# Patient Record
Sex: MALE | Race: WHITE | NOT HISPANIC OR LATINO | Employment: OTHER | URBAN - METROPOLITAN AREA
[De-identification: names, ages, dates, MRNs, and addresses within clinical notes are randomized per-mention and may not be internally consistent; named-entity substitution may affect disease eponyms.]

---

## 2017-05-12 ENCOUNTER — OFFICE VISIT (OUTPATIENT)
Dept: CARDIOLOGY | Facility: CLINIC | Age: 82
End: 2017-05-12

## 2017-05-12 VITALS
BODY MASS INDEX: 35.17 KG/M2 | SYSTOLIC BLOOD PRESSURE: 118 MMHG | WEIGHT: 265.4 LBS | DIASTOLIC BLOOD PRESSURE: 60 MMHG | HEIGHT: 73 IN | HEART RATE: 68 BPM

## 2017-05-12 DIAGNOSIS — E78.2 MIXED HYPERLIPIDEMIA: ICD-10-CM

## 2017-05-12 DIAGNOSIS — E11.65 TYPE 2 DIABETES MELLITUS WITH HYPERGLYCEMIA, WITHOUT LONG-TERM CURRENT USE OF INSULIN (HCC): ICD-10-CM

## 2017-05-12 DIAGNOSIS — I25.10 CORONARY ARTERY DISEASE INVOLVING NATIVE CORONARY ARTERY OF NATIVE HEART WITHOUT ANGINA PECTORIS: Primary | ICD-10-CM

## 2017-05-12 PROCEDURE — 99214 OFFICE O/P EST MOD 30 MIN: CPT | Performed by: INTERNAL MEDICINE

## 2017-05-12 RX ORDER — ZOLPIDEM TARTRATE 10 MG/1
10 TABLET ORAL NIGHTLY PRN
COMMUNITY
End: 2018-05-18

## 2017-05-12 RX ORDER — POTASSIUM CHLORIDE 20 MEQ/1
20 TABLET, EXTENDED RELEASE ORAL DAILY
COMMUNITY
End: 2022-06-08

## 2017-05-12 RX ORDER — FUROSEMIDE 20 MG/1
20 TABLET ORAL DAILY
COMMUNITY
End: 2022-09-23 | Stop reason: ALTCHOICE

## 2017-05-12 RX ORDER — HYDROCODONE BITARTRATE AND ACETAMINOPHEN 5; 325 MG/1; MG/1
1 TABLET ORAL EVERY 6 HOURS PRN
COMMUNITY

## 2017-05-12 RX ORDER — TAMSULOSIN HYDROCHLORIDE 0.4 MG/1
1 CAPSULE ORAL NIGHTLY
COMMUNITY
End: 2022-05-02

## 2018-05-18 ENCOUNTER — OFFICE VISIT (OUTPATIENT)
Dept: CARDIOLOGY | Facility: CLINIC | Age: 83
End: 2018-05-18

## 2018-05-18 VITALS
OXYGEN SATURATION: 92 % | HEIGHT: 73 IN | WEIGHT: 276 LBS | HEART RATE: 93 BPM | DIASTOLIC BLOOD PRESSURE: 62 MMHG | SYSTOLIC BLOOD PRESSURE: 114 MMHG | BODY MASS INDEX: 36.58 KG/M2

## 2018-05-18 DIAGNOSIS — E11.65 TYPE 2 DIABETES MELLITUS WITH HYPERGLYCEMIA, WITHOUT LONG-TERM CURRENT USE OF INSULIN (HCC): ICD-10-CM

## 2018-05-18 DIAGNOSIS — I25.10 CORONARY ARTERY DISEASE INVOLVING NATIVE CORONARY ARTERY OF NATIVE HEART WITHOUT ANGINA PECTORIS: Primary | ICD-10-CM

## 2018-05-18 DIAGNOSIS — E78.2 MIXED HYPERLIPIDEMIA: ICD-10-CM

## 2018-05-18 PROCEDURE — 99214 OFFICE O/P EST MOD 30 MIN: CPT | Performed by: INTERNAL MEDICINE

## 2018-05-18 RX ORDER — SUCRALFATE 1 G/1
1 TABLET ORAL 4 TIMES DAILY PRN
COMMUNITY
End: 2022-12-16 | Stop reason: SDUPTHER

## 2018-05-18 RX ORDER — FLUOXETINE HYDROCHLORIDE 20 MG/1
20 CAPSULE ORAL 2 TIMES DAILY
COMMUNITY
End: 2022-03-28 | Stop reason: ALTCHOICE

## 2018-05-18 RX ORDER — DUTASTERIDE 0.5 MG/1
0.5 CAPSULE, LIQUID FILLED ORAL DAILY
COMMUNITY
End: 2020-06-19

## 2018-05-18 RX ORDER — HYDROCODONE BITARTRATE AND ACETAMINOPHEN 5; 325 MG/1; MG/1
TABLET ORAL AS NEEDED
COMMUNITY
Start: 2018-03-05 | End: 2018-05-18

## 2018-05-18 NOTE — PROGRESS NOTES
[facility] at [facility]  Office Progress Note  Arie Fournier  1935  [phone]      Visit Date: 5/18/2018     PCP: Mike Bhardwaj MD  [address] [place] [id number]    IDENTIFICATION: A 83 y.o. male retired from [employer], originally from [place], [place], now lives in [place], [place].           PROBLEM LIST:   1. Coronary artery disease.   a. In 2002, left heart catheterization with minimal diffuse disease in the right coronary artery and minimal stenosis of 30% to the proximal LAD. Left ventricular ejection fraction 50%. Mild elevation of left ventricular end diastolic pressure, at [place], [place], per Chad Montes De Oca.   b. 4/16 echo EF 60% rvsp 40  2. Dyslipidemia.   a. In March of 2016: Total cholesterol 137, triglycerides 180, HDL 38, LDL 63.  3. Type 2 diabetes, onset 2001. In 2016, A1c was 6.5.   4. Obstructive sleep apnea with C-PAP use.   5. Benign prostatic hypertrophy.   6. Anxiety.   7. Osteoarthritis.   8. Injury from a fall in 2011.   9. Surgical history.   a. Remote tonsillectomy.   b. Remote left hand surgery.   c. Remote skin excision for tumor.        Chief Complaint   Patient presents with   • Coronary Artery Disease     Allergies  Allergies   Allergen Reactions   • Other      Horse Serum       Current Medications    Current Outpatient Prescriptions:   •  atorvastatin (LIPITOR) 80 MG tablet, Take 80 mg by mouth Daily. PT SAYS HE TAKES VARIATIONS OF THIS DOSAGE, Disp: , Rfl:   •  cetirizine (ZyrTEC) 10 MG tablet, Take 10 mg by mouth Daily., Disp: , Rfl:   •  coenzyme Q10 100 MG capsule, Take 200 mg by mouth Daily., Disp: , Rfl:   •  dutasteride (AVODART) 0.5 MG capsule, Take 0.5 mg by mouth Daily., Disp: , Rfl:   •  FLUoxetine (PROzac) 20 MG capsule, Take 20 mg by mouth Daily., Disp: , Rfl:   •  furosemide (LASIX) 20 MG tablet, Take 20 mg by mouth Daily., Disp: , Rfl:   •  HYDROcodone-acetaminophen (NORCO) 5-325 MG per  "tablet, Take 1 tablet by mouth Every 6 (Six) Hours As Needed., Disp: , Rfl:   •  metFORMIN (GLUCOPHAGE) 1000 MG tablet, Take 1,000 mg by mouth 2 (Two) Times a Day With Meals., Disp: , Rfl:   •  metoprolol tartrate (LOPRESSOR) 25 MG tablet, Take 25 mg by mouth 2 (Two) Times a Day., Disp: , Rfl:   •  Multiple Vitamin (MULTI VITAMIN PO), Take  by mouth Daily., Disp: , Rfl:   •  Omega-3 Fatty Acids (FISH OIL) 1000 MG capsule capsule, Take 1,200 mg by mouth Daily With Breakfast., Disp: , Rfl:   •  pioglitazone (ACTOS) 30 MG tablet, Take 30 mg by mouth Daily., Disp: , Rfl:   •  potassium chloride (K-DUR,KLOR-CON) 20 MEQ CR tablet, Take 20 mEq by mouth Daily., Disp: , Rfl:   •  Saw Palmetto, Serenoa repens, 450 MG capsule, Take  by mouth 2 (Two) Times a Day., Disp: , Rfl:   •  sucralfate (CARAFATE) 1 g tablet, Take 1 g by mouth 4 (Four) Times a Day As Needed., Disp: , Rfl:   •  tamsulosin (FLOMAX) 0.4 MG capsule 24 hr capsule, Take 1 capsule by mouth Every Night., Disp: , Rfl:       History of Present Illness     Pt denies any new chest pain, dyspnea, dyspnea on exertion, orthopnea, PND, palpitations, lower extremity edema.    Admits to dietary noncompliance and eating at [employer] on regular occasion.  He does have a garden and continues to do his on the lawn work  ROS:  All systems have been reviewed and are negative with the exception of those mentioned in the HPI.    OBJECTIVE:  Vitals:    05/18/18 1415   BP: 114/62   BP Location: Right arm   Patient Position: Sitting   Pulse: 93   SpO2: 92%   Weight: 125 kg (276 lb)   Height: 185.4 cm (73\")     Physical Exam   Constitutional: He appears well-developed and well-nourished.   Neck: Normal range of motion. Neck supple. No hepatojugular reflux and no JVD present. Carotid bruit is not present. No tracheal deviation present. No thyromegaly present.   Cardiovascular: Normal rate, regular rhythm, S1 normal, S2 normal, intact distal pulses and normal pulses.  PMI is not " displaced.  Exam reveals no gallop, no distant heart sounds, no friction rub, no midsystolic click and no opening snap.    No murmur heard.  Pulses:       Radial pulses are 2+ on the right side, and 2+ on the left side.        Dorsalis pedis pulses are 2+ on the right side, and 2+ on the left side.        Posterior tibial pulses are 2+ on the right side, and 2+ on the left side.   Pulmonary/Chest: Effort normal and breath sounds normal. He has no wheezes. He has no rales.   Abdominal: Soft. Bowel sounds are normal. He exhibits no mass. There is no tenderness. There is no guarding.       Diagnostic Data:  Procedures      ASSESSMENT:   Diagnosis Plan   1. Coronary artery disease involving native coronary artery of native heart without angina pectoris     2. Mixed hyperlipidemia     3. Type 2 diabetes mellitus with hyperglycemia, without long-term current use of insulin         PLAN:  CAD nonobst no sxs at current continue current RX     Dyslipidemia on statin therapy    Diabetes on oral agents with A1c less than 7    Hypertension well controlled intolerant of low-dose beta-blockade    Mike Bhardwaj MD, thank you for referring Mr. Fournier for evaluation.  I have forwarded my electronically generated recommendations to you for review.  Please do not hesitate to call with any questions.    Scribed for Arie Fatima MD by Kinga Cuellar PA-C. 5/18/2018  2:25 PM   I, Arie Fatima MD, personally performed the services described in this documentation as scribed by the above named individual in my presence, and it is both accurate and complete.  5/18/2018  2:35 PM    Arie Fatima MD, [facility]

## 2019-06-06 ENCOUNTER — OFFICE VISIT (OUTPATIENT)
Dept: CARDIOLOGY | Facility: CLINIC | Age: 84
End: 2019-06-06

## 2019-06-06 VITALS
DIASTOLIC BLOOD PRESSURE: 64 MMHG | HEIGHT: 73 IN | WEIGHT: 270 LBS | SYSTOLIC BLOOD PRESSURE: 124 MMHG | HEART RATE: 62 BPM | OXYGEN SATURATION: 94 % | BODY MASS INDEX: 35.78 KG/M2

## 2019-06-06 DIAGNOSIS — I10 ESSENTIAL HYPERTENSION: ICD-10-CM

## 2019-06-06 DIAGNOSIS — E78.2 MIXED HYPERLIPIDEMIA: ICD-10-CM

## 2019-06-06 DIAGNOSIS — E11.65 TYPE 2 DIABETES MELLITUS WITH HYPERGLYCEMIA, WITHOUT LONG-TERM CURRENT USE OF INSULIN (HCC): ICD-10-CM

## 2019-06-06 DIAGNOSIS — I25.10 CORONARY ARTERY DISEASE INVOLVING NATIVE CORONARY ARTERY OF NATIVE HEART WITHOUT ANGINA PECTORIS: Primary | ICD-10-CM

## 2019-06-06 PROCEDURE — 99214 OFFICE O/P EST MOD 30 MIN: CPT | Performed by: INTERNAL MEDICINE

## 2019-06-06 RX ORDER — CLONAZEPAM 1 MG/1
1 TABLET ORAL AS NEEDED
Refills: 2 | COMMUNITY
Start: 2019-05-21 | End: 2022-12-16

## 2019-06-06 NOTE — PROGRESS NOTES
[facility] at [facility]  Office Progress Note  Arie Fournier  1935  [phone]      Visit Date: 6/6/2019     PCP: Mike Bhardwaj MD  [address] [place] [id number]    IDENTIFICATION: A 84 y.o. male retired from [employer], originally from [place], [place], now lives in [place], [place].           PROBLEM LIST:   1. Coronary artery disease.   a. In 2002, left heart catheterization with minimal diffuse disease in the right coronary artery and minimal stenosis of 30% to the proximal LAD. Left ventricular ejection fraction 50%. Mild elevation of left ventricular end diastolic pressure, at [place], [place], per Chad Montes De Oca.   b. 4/16 echo EF 60% rvsp 40  2. Dyslipidemia.   a. In March of 2016: Total cholesterol 137, triglycerides 180, HDL 38, LDL 63.  3. Type 2 diabetes,   a. onset 2001.   b. In 2016, A1c was 6.5.   c. 6/1/2018 A1c 6.9  4. Obstructive sleep apnea with C-PAP use.   5. Benign prostatic hypertrophy.   6. Anxiety.   7. Osteoarthritis.   8. Injury from a fall in 2011.   9. Surgical history.   a. Remote tonsillectomy.   b. Remote left hand surgery.   c. Remote skin excision for tumor.        Chief Complaint   Patient presents with   • Coronary Artery Disease     Allergies  Allergies   Allergen Reactions   • Other      Horse Serum       Current Medications    Current Outpatient Medications:   •  atorvastatin (LIPITOR) 80 MG tablet, Take 80 mg by mouth Daily. PT SAYS HE TAKES VARIATIONS OF THIS DOSAGE, Disp: , Rfl:   •  cetirizine (ZyrTEC) 10 MG tablet, Take 10 mg by mouth Daily., Disp: , Rfl:   •  clonazePAM (KlonoPIN) 1 MG tablet, Take 1 mg by mouth As Needed., Disp: , Rfl: 2  •  coenzyme Q10 100 MG capsule, Take 200 mg by mouth Daily., Disp: , Rfl:   •  dutasteride (AVODART) 0.5 MG capsule, Take 0.5 mg by mouth Daily., Disp: , Rfl:   •  FLUoxetine (PROzac) 20 MG capsule, Take 20 mg by mouth Daily., Disp: , Rfl:   •  furosemide (LASIX)  "20 MG tablet, Take 20 mg by mouth Daily., Disp: , Rfl:   •  HYDROcodone-acetaminophen (NORCO) 5-325 MG per tablet, Take 1 tablet by mouth Every 6 (Six) Hours As Needed., Disp: , Rfl:   •  metFORMIN (GLUCOPHAGE) 1000 MG tablet, Take 1,000 mg by mouth 2 (Two) Times a Day With Meals., Disp: , Rfl:   •  methylcellulose, Laxative, (CITRUCEL) 500 MG tablet tablet, Take 2 tablets by mouth As Needed., Disp: , Rfl:   •  metoprolol tartrate (LOPRESSOR) 25 MG tablet, Take 1 tablet by mouth Every 12 (Twelve) Hours., Disp: 180 tablet, Rfl: 3  •  Multiple Vitamin (MULTI VITAMIN PO), Take  by mouth Daily., Disp: , Rfl:   •  Omega-3 Fatty Acids (FISH OIL) 1000 MG capsule capsule, Take 1,200 mg by mouth Daily With Breakfast., Disp: , Rfl:   •  pioglitazone (ACTOS) 30 MG tablet, Take 30 mg by mouth Daily., Disp: , Rfl:   •  potassium chloride (K-DUR,KLOR-CON) 20 MEQ CR tablet, Take 20 mEq by mouth Daily., Disp: , Rfl:   •  Saw Palmetto, Serenoa repens, 450 MG capsule, Take  by mouth 2 (Two) Times a Day., Disp: , Rfl:   •  sucralfate (CARAFATE) 1 g tablet, Take 1 g by mouth 4 (Four) Times a Day As Needed., Disp: , Rfl:   •  tamsulosin (FLOMAX) 0.4 MG capsule 24 hr capsule, Take 1 capsule by mouth Every Night., Disp: , Rfl:       History of Present Illness     Pt denies any new chest pain, dyspnea, dyspnea on exertion, orthopnea, PND, palpitations, lower extremity edema.    Has been following his blood pressure and heart rates at home and noted heart rate high 50s low 60s at times.  His most recent A1c was 6.9 he is having difficulty with nighttime eating      OBJECTIVE:  Vitals:    06/06/19 1327   BP: 124/64   BP Location: Left arm   Patient Position: Sitting   Pulse: 62   SpO2: 94%   Weight: 122 kg (270 lb)   Height: 185.4 cm (73\")     Physical Exam   Constitutional: He appears well-developed and well-nourished.   Neck: Normal range of motion. Neck supple. No hepatojugular reflux and no JVD present. Carotid bruit is not present. No " tracheal deviation present. No thyromegaly present.   Cardiovascular: Normal rate, regular rhythm, S1 normal, S2 normal, intact distal pulses and normal pulses. PMI is not displaced. Exam reveals no gallop, no distant heart sounds, no friction rub, no midsystolic click and no opening snap.   No murmur heard.  Pulses:       Radial pulses are 2+ on the right side, and 2+ on the left side.        Dorsalis pedis pulses are 2+ on the right side, and 2+ on the left side.        Posterior tibial pulses are 2+ on the right side, and 2+ on the left side.   Pulmonary/Chest: Effort normal and breath sounds normal. He has no wheezes. He has no rales.   Abdominal: Soft. Bowel sounds are normal. He exhibits no mass. There is no tenderness. There is no guarding.       Diagnostic Data:  Procedures      ASSESSMENT:   Diagnosis Plan   1. Coronary artery disease involving native coronary artery of native heart without angina pectoris     2. Mixed hyperlipidemia     3. Type 2 diabetes mellitus with hyperglycemia, without long-term current use of insulin (CMS/[facility])     4. Essential hypertension         PLAN:  CAD nonobst no sxs at current continue current RX     Dyslipidemia on statin therapy    Diabetes on oral agents with A1c less than 7    Hypertension well controlled intolerant of low-dose beta-blockade    Mike Bhardwaj MD, thank you for referring Mr. Fournier for evaluation.  I have forwarded my electronically generated recommendations to you for review.  Please do not hesitate to call with any questions.    Arie Fatima MD, [facility]

## 2020-06-18 NOTE — PROGRESS NOTES
[facility] at [facility]  Office Progress Note  Arie Fournier  1935  [address]  [place] [address]       Visit Date: 06/19/20    PCP: Mike Bhardwaj MD  [address]  [place] [address]    IDENTIFICATION: A 85 y.o. male retired from [employer], originally from [place], [place], now lives in [place], [place].           PROBLEM LIST:   1. Coronary artery disease.   a. In 2002, left heart catheterization with minimal diffuse disease in the right coronary artery and minimal stenosis of 30% to the proximal LAD. Left ventricular ejection fraction 50%. Mild elevation of left ventricular end diastolic pressure, at [place], [place], per Chad Montes De Oca.   b. 4/16 echo EF 60% rvsp 40  2. Dyslipidemia.   a. In March of 2016: Total cholesterol 137, triglycerides 180, HDL 38, LDL 63.  3. Type 2 diabetes,   a. onset 2001.   b. In 2016, A1c was 6.5.   c. 6/1/2018 A1c 6.9  4. Obstructive sleep apnea with C-PAP use.   5. Benign prostatic hypertrophy.   6. Anxiety.   7. Osteoarthritis.   8. Injury from a fall in 2011.   9. Surgical history.   a. Remote tonsillectomy.   b. Remote left hand surgery.   c. Remote skin excision for tumor.        Chief Complaint   Patient presents with   • Coronary Artery Disease       Allergies  Allergies   Allergen Reactions   • Other      Horse Serum       Current Medications    Current Outpatient Medications:   •  ARIPIPRAZOLE PO, Take 1 tablet by mouth Daily., Disp: , Rfl:   •  atorvastatin (LIPITOR) 80 MG tablet, Take 80 mg by mouth Daily. PT SAYS HE TAKES VARIATIONS OF THIS DOSAGE, Disp: , Rfl:   •  cetirizine (ZyrTEC) 10 MG tablet, Take 10 mg by mouth Daily., Disp: , Rfl:   •  clonazePAM (KlonoPIN) 1 MG tablet, Take 1 mg by mouth As Needed., Disp: , Rfl: 2  •  coenzyme Q10 100 MG capsule, Take 200 mg by mouth Daily., Disp: , Rfl:   •  FINASTERIDE PO, Take 1 tablet by mouth Daily., Disp: , Rfl:   •  FLUoxetine (PROzac) 20 MG  capsule, Take 20 mg by mouth 2 (Two) Times a Day., Disp: , Rfl:   •  Fluticasone Furoate-Vilanterol (BREO ELLIPTA IN), Inhale As Needed., Disp: , Rfl:   •  furosemide (LASIX) 20 MG tablet, Take 20 mg by mouth Daily., Disp: , Rfl:   •  HYDROcodone-acetaminophen (NORCO) 5-325 MG per tablet, Take 1 tablet by mouth Every 6 (Six) Hours As Needed., Disp: , Rfl:   •  metFORMIN (GLUCOPHAGE) 1000 MG tablet, Take 1,000 mg by mouth 2 (Two) Times a Day With Meals., Disp: , Rfl:   •  methylcellulose, Laxative, (CITRUCEL) 500 MG tablet tablet, Take 2 tablets by mouth As Needed., Disp: , Rfl:   •  metoprolol tartrate (LOPRESSOR) 25 MG tablet, Take 1 tablet by mouth Every 12 (Twelve) Hours. (Patient taking differently: Take 25 mg by mouth Every 12 (Twelve) Hours. 0.5 tablet twice daily), Disp: 180 tablet, Rfl: 3  •  Multiple Vitamin (MULTI VITAMIN PO), Take  by mouth Daily., Disp: , Rfl:   •  Omega-3 Fatty Acids (FISH OIL) 1000 MG capsule capsule, Take 1,200 mg by mouth Daily With Breakfast., Disp: , Rfl:   •  pioglitazone (ACTOS) 30 MG tablet, Take 30 mg by mouth Daily., Disp: , Rfl:   •  potassium chloride (K-DUR,KLOR-CON) 20 MEQ CR tablet, Take 20 mEq by mouth Daily., Disp: , Rfl:   •  Saw Palmetto, Serenoa repens, 450 MG capsule, Take  by mouth 2 (Two) Times a Day., Disp: , Rfl:   •  sucralfate (CARAFATE) 1 g tablet, Take 1 g by mouth 4 (Four) Times a Day As Needed., Disp: , Rfl:   •  tamsulosin (FLOMAX) 0.4 MG capsule 24 hr capsule, Take 1 capsule by mouth Every Night., Disp: , Rfl:       History of Present Illness   Arie Fournier is a 85 y.o. year old male here for follow up.  Occasional dizziness slowing down some he is fractured his right foot with ankle injury and is in a walking boot  Brings his blood pressure and blood sugar diary with him with a blood sugars typically less than 150 blood pressure systolic 1 10-1 40 range      OBJECTIVE:  Vitals:    06/19/20 1308   BP: 112/60   BP Location: Right arm   Patient  "Position: Sitting   Pulse: 69   SpO2: 95%   Weight: 126 kg (277 lb)   Height: 185.4 cm (73\")     Physical Exam   Constitutional: He appears well-developed and well-nourished.   Neck: Normal range of motion. Neck supple. No hepatojugular reflux and no JVD present. Carotid bruit is not present. No tracheal deviation present. No thyromegaly present.   Cardiovascular: Normal rate, regular rhythm, S1 normal, S2 normal, intact distal pulses and normal pulses. PMI is not displaced. Exam reveals no gallop, no distant heart sounds, no friction rub, no midsystolic click and no opening snap.   Murmur heard.  Pulses:       Radial pulses are 2+ on the right side, and 2+ on the left side.        Dorsalis pedis pulses are 2+ on the right side, and 2+ on the left side.        Posterior tibial pulses are 2+ on the right side, and 2+ on the left side.   Pulmonary/Chest: Effort normal and breath sounds normal. He has no wheezes. He has no rales.   Abdominal: Soft. Bowel sounds are normal. He exhibits no mass. There is no tenderness. There is no guarding.       Diagnostic Data:  Procedures      ASSESSMENT:   Diagnosis Plan   1. Coronary artery disease involving native coronary artery of native heart without angina pectoris     2. Essential hypertension     3. Mixed hyperlipidemia     4. Type 2 diabetes mellitus with hyperglycemia, without long-term current use of insulin (CMS/[facility])         PLAN:  CAD post remote catheterization no obstruction at that time preserved LV function continued medical management.  He had lab work obtained per his primary care provider within the last month    Hypertension controlled current metoprolol tamsulosin    Dyslipidemia not on statin therapy    Diabetes A1c less than 7 per recent evaluation with dietary indiscretion    Mike Bhardwaj MD, thank you for referring Mr. Fournier for evaluation.  I have forwarded my electronically generated recommendations to you for review.  Please do not hesitate to " call with any questions.      Arie Fatima MD, FACC

## 2020-06-19 ENCOUNTER — OFFICE VISIT (OUTPATIENT)
Dept: CARDIOLOGY | Facility: CLINIC | Age: 85
End: 2020-06-19

## 2020-06-19 VITALS
DIASTOLIC BLOOD PRESSURE: 60 MMHG | SYSTOLIC BLOOD PRESSURE: 112 MMHG | WEIGHT: 277 LBS | BODY MASS INDEX: 36.71 KG/M2 | HEART RATE: 69 BPM | OXYGEN SATURATION: 95 % | HEIGHT: 73 IN

## 2020-06-19 DIAGNOSIS — E11.65 TYPE 2 DIABETES MELLITUS WITH HYPERGLYCEMIA, WITHOUT LONG-TERM CURRENT USE OF INSULIN (HCC): ICD-10-CM

## 2020-06-19 DIAGNOSIS — I25.10 CORONARY ARTERY DISEASE INVOLVING NATIVE CORONARY ARTERY OF NATIVE HEART WITHOUT ANGINA PECTORIS: Primary | ICD-10-CM

## 2020-06-19 DIAGNOSIS — I10 ESSENTIAL HYPERTENSION: ICD-10-CM

## 2020-06-19 DIAGNOSIS — E78.2 MIXED HYPERLIPIDEMIA: ICD-10-CM

## 2020-06-19 PROCEDURE — 99214 OFFICE O/P EST MOD 30 MIN: CPT | Performed by: INTERNAL MEDICINE

## 2020-06-19 RX ORDER — FLUTICASONE PROPIONATE AND SALMETEROL 232; 14 UG/1; UG/1
POWDER, METERED RESPIRATORY (INHALATION)
COMMUNITY
Start: 2020-04-06 | End: 2020-06-19

## 2021-06-08 ENCOUNTER — OUTSIDE FACILITY SERVICE (OUTPATIENT)
Dept: CARDIOLOGY | Facility: CLINIC | Age: 86
End: 2021-06-08

## 2021-06-08 PROCEDURE — 93306 TTE W/DOPPLER COMPLETE: CPT | Performed by: INTERNAL MEDICINE

## 2022-03-22 RX ORDER — VENLAFAXINE HYDROCHLORIDE 75 MG/1
CAPSULE, EXTENDED RELEASE ORAL
Qty: 90 CAPSULE | Refills: 0 | Status: SHIPPED | OUTPATIENT
Start: 2022-03-22 | End: 2022-03-28

## 2022-03-28 RX ORDER — VENLAFAXINE HYDROCHLORIDE 75 MG/1
CAPSULE, EXTENDED RELEASE ORAL
Qty: 90 CAPSULE | Refills: 0 | Status: SHIPPED | OUTPATIENT
Start: 2022-03-28 | End: 2022-10-03

## 2022-04-05 RX ORDER — PIOGLITAZONEHYDROCHLORIDE 30 MG/1
TABLET ORAL
Qty: 90 TABLET | Refills: 0 | Status: SHIPPED | OUTPATIENT
Start: 2022-04-05 | End: 2022-07-11

## 2022-04-25 ENCOUNTER — TELEPHONE (OUTPATIENT)
Dept: FAMILY MEDICINE CLINIC | Facility: CLINIC | Age: 87
End: 2022-04-25

## 2022-04-25 ENCOUNTER — TRANSITIONAL CARE MANAGEMENT TELEPHONE ENCOUNTER (OUTPATIENT)
Dept: FAMILY MEDICINE CLINIC | Facility: CLINIC | Age: 87
End: 2022-04-25

## 2022-04-25 NOTE — TELEPHONE ENCOUNTER
Pt daughter scheduled the following appt:  HFU, FLUID ON LUNGS, [facility] D/C 4/24

## 2022-05-02 RX ORDER — TAMSULOSIN HYDROCHLORIDE 0.4 MG/1
CAPSULE ORAL
Qty: 90 CAPSULE | Refills: 0 | Status: SHIPPED | OUTPATIENT
Start: 2022-05-02 | End: 2022-08-04

## 2022-05-10 ENCOUNTER — OFFICE VISIT (OUTPATIENT)
Dept: FAMILY MEDICINE CLINIC | Facility: CLINIC | Age: 87
End: 2022-05-10

## 2022-05-10 VITALS
HEIGHT: 73 IN | WEIGHT: 242.3 LBS | BODY MASS INDEX: 32.11 KG/M2 | SYSTOLIC BLOOD PRESSURE: 108 MMHG | DIASTOLIC BLOOD PRESSURE: 64 MMHG | HEART RATE: 63 BPM | OXYGEN SATURATION: 97 %

## 2022-05-10 DIAGNOSIS — I10 BENIGN ESSENTIAL HTN: ICD-10-CM

## 2022-05-10 DIAGNOSIS — J90 PLEURAL EFFUSION: Primary | ICD-10-CM

## 2022-05-10 DIAGNOSIS — S00.81XA ABRASION OF FACE, INITIAL ENCOUNTER: ICD-10-CM

## 2022-05-10 DIAGNOSIS — I51.7 ENLARGED HEART: ICD-10-CM

## 2022-05-10 PROCEDURE — 99214 OFFICE O/P EST MOD 30 MIN: CPT | Performed by: NURSE PRACTITIONER

## 2022-05-10 RX ORDER — MONTELUKAST SODIUM 10 MG/1
10 TABLET ORAL EVERY EVENING
COMMUNITY
Start: 2022-04-20

## 2022-05-10 RX ORDER — FINASTERIDE 5 MG/1
5 TABLET, FILM COATED ORAL DAILY
COMMUNITY
Start: 2022-03-21 | End: 2023-01-03

## 2022-05-10 RX ORDER — IPRATROPIUM BROMIDE 42 UG/1
SPRAY, METERED NASAL
COMMUNITY
Start: 2022-04-28 | End: 2023-03-16 | Stop reason: SDUPTHER

## 2022-05-10 RX ORDER — MUPIROCIN CALCIUM 20 MG/G
1 CREAM TOPICAL 3 TIMES DAILY
Qty: 15 G | Refills: 0 | Status: SHIPPED | OUTPATIENT
Start: 2022-05-10

## 2022-05-10 NOTE — ASSESSMENT & PLAN NOTE
Continue current medications.  Doing well and states improving greatly.  Keep follow-up with pulmonary as scheduled.  We will repeat chest x-ray today.  Will let know if radiologist sees anything.  Return to clinic or ED if symptoms recur or cough or shortness of breath recur.  Return to clinic or ED with any issues or concerns.

## 2022-05-10 NOTE — PROGRESS NOTES
"Chief Complaint  HFU    Subjective          Arie Fournier presents to [facility]  History of Present Illness     Patient presents for hospital follow-up.  Was admitted to [facility] on 4/20/2022 and discharged on 4/24/2022.  Past history of hyperlipidemia, obesity, hypertension, type 2 diabetes, who presented with complaints of shortness of breath.  Was found to have a small right-sided pleural effusion as well as atelectasis.  He was started on Zithromax and was transitioned to oral steroids.  He finished steroids and Zithromax outpatient.  Chest x-ray showed mildly increased left basilar atelectasis or airspace disease and mildly enlarged heart.      Day of discharge CBC was unremarkable.    CMP unremarkable.  Troponins were low.    A1c of 7.0.    States he is feeling well.  States the cough does not come back and he is breathing well.  States he has a follow-up with his pulmonary doctor Dr. George next Tuesday.  States he also has a follow-up with his PCP Dr. Bhardwaj on the 18th.  Informed patient that he is due to have a lipid PSA and TSH drawn but he has eaten today so he states he will come fasting with his appointment for Dr. Bhardwaj for blood work at that day.      Also has a small abrasion present to right side of his face under his lower lip on the right side.  Is asking if there is some type of cream that we can give him to help it heal up a little bit quicker.  States it is slightly tender to palpation.  No discharge.      Objective   Vital Signs:   /64   Pulse 63   Ht 185.4 cm (73\")   Wt 110 kg (242 lb 4.8 oz)   SpO2 97%   BMI 31.97 kg/m²     Body mass index is 31.97 kg/m².    Review of Systems   Constitutional: Negative for chills, fatigue and fever.   Eyes: Negative for blurred vision.   Respiratory: Negative for cough, choking, chest tightness, shortness of breath and wheezing.    Cardiovascular: Negative.    Gastrointestinal: " Negative for constipation, diarrhea, nausea and vomiting.   Genitourinary: Negative for dysuria.   Musculoskeletal: Negative for back pain.   Neurological: Negative for dizziness, light-headedness and headache.   Psychiatric/Behavioral: Negative.        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  In 2016, A1c was 6.5..   Surgical History: has a past surgical history that includes Tonsillectomy; Hand surgery; Tumor excision; and Cyst Removal (1976).   Family History: family history includes Diabetes in his brother; Hyperlipidemia in his brother; Hypertension in his brother, father, and mother; Prostate cancer in his brother; Stroke in his father and mother.   Social History: reports that he has never smoked. He has never used smokeless tobacco. He reports that he does not drink alcohol and does not use drugs.    PHQ-2 Depression Screening  Little interest or pleasure in doing things? 0-->not at all   Feeling down, depressed, or hopeless? 0-->not at all   PHQ-2 Total Score 0        PHQ-9 Depression Screening  Little interest or pleasure in doing things? 0-->not at all   Feeling down, depressed, or hopeless? 0-->not at all   Trouble falling or staying asleep, or sleeping too much?     Feeling tired or having little energy?     Poor appetite or overeating?     Feeling bad about yourself - or that you are a failure or have let yourself or your  family down?     Trouble concentrating on things, such as reading the newspaper or watching television?     Moving or speaking so slowly that other people could have noticed? Or the opposite - being so fidgety or restless that you have been moving around a lot more than usual?     Thoughts that you would be better off dead, or of hurting yourself in some way?     PHQ-9 Total Score 0   If you checked off any problems, how difficult have these problems made it for you to do your work, take care of things at home, or get along with other people?       PHQ-9 Total Score: 0            Current Outpatient Medications:   •  ARIPIPRAZOLE PO, Take 1 tablet by mouth Daily., Disp: , Rfl:   •  atorvastatin (LIPITOR) 80 MG tablet, Take 80 mg by mouth Daily. PT SAYS HE TAKES VARIATIONS OF THIS DOSAGE, Disp: , Rfl:   •  cetirizine (ZyrTEC) 10 MG tablet, Take 10 mg by mouth Daily., Disp: , Rfl:   •  clonazePAM (KlonoPIN) 1 MG tablet, Take 1 mg by mouth As Needed., Disp: , Rfl: 2  •  coenzyme Q10 100 MG capsule, Take 200 mg by mouth Daily., Disp: , Rfl:   •  finasteride (PROSCAR) 5 MG tablet, Take 5 mg by mouth Daily., Disp: , Rfl:   •  Fluticasone Furoate-Vilanterol (BREO ELLIPTA IN), Inhale As Needed., Disp: , Rfl:   •  furosemide (LASIX) 20 MG tablet, Take 20 mg by mouth Daily., Disp: , Rfl:   •  HYDROcodone-acetaminophen (NORCO) 5-325 MG per tablet, Take 1 tablet by mouth Every 6 (Six) Hours As Needed., Disp: , Rfl:   •  ipratropium (ATROVENT) 0.06 % nasal spray, USE 2 SPRAY(S) IN EACH NOSTRIL THREE TIMES DAILY, Disp: , Rfl:   •  metFORMIN (GLUCOPHAGE) 1000 MG tablet, Take 1,000 mg by mouth 2 (Two) Times a Day With Meals., Disp: , Rfl:   •  methylcellulose, Laxative, (CITRUCEL) 500 MG tablet tablet, Take 2 tablets by mouth As Needed., Disp: , Rfl:   •  metoprolol tartrate (LOPRESSOR) 25 MG tablet, Take 1 tablet by mouth Every 12 (Twelve) Hours. (Patient taking differently: Take 25 mg by mouth Every 12 (Twelve) Hours. 0.5 tablet  twice daily), Disp: 180 tablet, Rfl: 3  •  montelukast (SINGULAIR) 10 MG tablet, Take 10 mg by mouth Every Evening., Disp: , Rfl:   •  Multiple Vitamin (MULTI VITAMIN PO), Take  by mouth Daily., Disp: , Rfl:   •  mupirocin (Bactroban) 2 % cream, Apply 1 application topically to the appropriate area as directed 3 (Three) Times a Day., Disp: 15 g, Rfl: 0  •  Omega-3 Fatty Acids (FISH OIL) 1000 MG capsule capsule, Take 1,200 mg by mouth Daily With Breakfast., Disp: , Rfl:   •  pioglitazone (ACTOS) 30 MG tablet, TAKE 1/2 TO 1 (ONE-HALF TO ONE) TABLET BY MOUTH ONCE DAILY, Disp: 90 tablet, Rfl: 0  •  potassium chloride (K-DUR,KLOR-CON) 20 MEQ CR tablet, Take 20 mEq by mouth Daily., Disp: , Rfl:   •  Saw Palmetto, Serenoa repens, 450 MG capsule, Take  by mouth 2 (Two) Times a Day., Disp: , Rfl:   •  sucralfate (CARAFATE) 1 g tablet, Take 1 g by mouth 4 (Four) Times a Day As Needed., Disp: , Rfl:   •  tamsulosin (FLOMAX) 0.4 MG capsule 24 hr capsule, TAKE 1 CAPSULE BY MOUTH ONCE DAILY 30 MINUTES FOLLOWING THE SAME MEAL EACH DAY, Disp: 90 capsule, Rfl: 0  •  venlafaxine XR (EFFEXOR-XR) 75 MG 24 hr capsule, TAKE 1 CAPSULE BY MOUTH ONCE DAILY WITH FOOD, Disp: 90 capsule, Rfl: 0   (Not in a hospital admission)     Allergies: Other and Tetanus immune globulin    Physical Exam  Constitutional:       Appearance: Normal appearance.   HENT:      Mouth/Throat:      Mouth: Mucous membranes are moist.      Pharynx: Oropharynx is clear.      Comments: Small abrasion present to right side of face below lip. No discharge. Non-tender.   Eyes:      Conjunctiva/sclera: Conjunctivae normal.      Pupils: Pupils are equal, round, and reactive to light.   Cardiovascular:      Rate and Rhythm: Normal rate and regular rhythm.      Heart sounds: Normal heart sounds.   Pulmonary:      Effort: Pulmonary effort is normal.   Neurological:      General: No focal deficit present.      Mental Status: He is alert and oriented to person, place, and time.  Mental status is at baseline.   Psychiatric:         Mood and Affect: Mood normal.         Behavior: Behavior normal.         Thought Content: Thought content normal.         Judgment: Judgment normal.          Result Review :                   Assessment and Plan    Diagnoses and all orders for this visit:    1. Pleural effusion (Primary)  Assessment & Plan:  Continue current medications.  Doing well and states improving greatly.  Keep follow-up with pulmonary as scheduled.  We will repeat chest x-ray today.  Will let know if radiologist sees anything.  Return to clinic or ED if symptoms recur or cough or shortness of breath recur.  Return to clinic or ED with any issues or concerns.    Orders:  -     XR Chest PA & Lateral (In Office)    2. Benign essential HTN    3. Enlarged heart  Assessment & Plan:  Inform patient of the chest x-ray showing enlarged heart from the hospital.  Denied cardiology referral.  Education provided.  States will follow up with his PCP on the 18th.  Risk discussed and understood.  Return to clinic or ED with any issues or concerns.      4. Abrasion of face, initial encounter  Assessment & Plan:  Topical antibiotic as prescribed.  Keep clean soap and water.  Return in a week if no improvement, sooner if worsens.  Risk discussed understood.  Return to clinic or ED with any issues or concerns.    Orders:  -     mupirocin (Bactroban) 2 % cream; Apply 1 application topically to the appropriate area as directed 3 (Three) Times a Day.  Dispense: 15 g; Refill: 0                   Follow Up   Return for Dr. moss appoointment 5/18/22.  Patient was given instructions and counseling regarding his condition or for health maintenance advice. Please see specific information pulled into the AVS if appropriate.     TAMERA Lees

## 2022-05-10 NOTE — ASSESSMENT & PLAN NOTE
Topical antibiotic as prescribed.  Keep clean soap and water.  Return in a week if no improvement, sooner if worsens.  Risk discussed understood.  Return to clinic or ED with any issues or concerns.

## 2022-05-10 NOTE — ASSESSMENT & PLAN NOTE
Inform patient of the chest x-ray showing enlarged heart from the hospital.  Denied cardiology referral.  Education provided.  States will follow up with his PCP on the 18th.  Risk discussed and understood.  Return to clinic or ED with any issues or concerns.

## 2022-05-12 ENCOUNTER — TELEPHONE (OUTPATIENT)
Dept: FAMILY MEDICINE CLINIC | Facility: CLINIC | Age: 87
End: 2022-05-12

## 2022-05-18 ENCOUNTER — LAB (OUTPATIENT)
Dept: FAMILY MEDICINE CLINIC | Facility: CLINIC | Age: 87
End: 2022-05-18

## 2022-05-18 ENCOUNTER — OFFICE VISIT (OUTPATIENT)
Dept: FAMILY MEDICINE CLINIC | Facility: CLINIC | Age: 87
End: 2022-05-18

## 2022-05-18 VITALS
SYSTOLIC BLOOD PRESSURE: 150 MMHG | HEART RATE: 68 BPM | BODY MASS INDEX: 31.93 KG/M2 | DIASTOLIC BLOOD PRESSURE: 80 MMHG | WEIGHT: 242 LBS

## 2022-05-18 DIAGNOSIS — E11.65 TYPE 2 DIABETES MELLITUS WITH HYPERGLYCEMIA, WITHOUT LONG-TERM CURRENT USE OF INSULIN: ICD-10-CM

## 2022-05-18 DIAGNOSIS — E11.9 TYPE 2 DIABETES MELLITUS WITHOUT COMPLICATION, WITHOUT LONG-TERM CURRENT USE OF INSULIN: ICD-10-CM

## 2022-05-18 DIAGNOSIS — I10 BENIGN ESSENTIAL HTN: Primary | ICD-10-CM

## 2022-05-18 DIAGNOSIS — I10 BENIGN ESSENTIAL HTN: ICD-10-CM

## 2022-05-18 PROCEDURE — 36415 COLL VENOUS BLD VENIPUNCTURE: CPT | Performed by: FAMILY MEDICINE

## 2022-05-18 PROCEDURE — 99214 OFFICE O/P EST MOD 30 MIN: CPT | Performed by: FAMILY MEDICINE

## 2022-05-19 LAB
ALBUMIN SERPL-MCNC: 4.1 G/DL (ref 3.6–4.6)
ALBUMIN/GLOB SERPL: 1.9 {RATIO} (ref 1.2–2.2)
ALP SERPL-CCNC: 52 IU/L (ref 44–121)
ALT SERPL-CCNC: 18 IU/L (ref 0–44)
AST SERPL-CCNC: 21 IU/L (ref 0–40)
BASOPHILS # BLD AUTO: 0 X10E3/UL (ref 0–0.2)
BASOPHILS NFR BLD AUTO: 1 %
BILIRUB SERPL-MCNC: 0.5 MG/DL (ref 0–1.2)
BUN SERPL-MCNC: 17 MG/DL (ref 8–27)
BUN/CREAT SERPL: 19 (ref 10–24)
CALCIUM SERPL-MCNC: 9.2 MG/DL (ref 8.6–10.2)
CHLORIDE SERPL-SCNC: 102 MMOL/L (ref 96–106)
CHOLEST SERPL-MCNC: 120 MG/DL (ref 100–199)
CO2 SERPL-SCNC: 21 MMOL/L (ref 20–29)
CREAT SERPL-MCNC: 0.9 MG/DL (ref 0.76–1.27)
EGFRCR SERPLBLD CKD-EPI 2021: 83 ML/MIN/1.73
EOSINOPHIL # BLD AUTO: 0.2 X10E3/UL (ref 0–0.4)
EOSINOPHIL NFR BLD AUTO: 5 %
ERYTHROCYTE [DISTWIDTH] IN BLOOD BY AUTOMATED COUNT: 13.8 % (ref 11.6–15.4)
GLOBULIN SER CALC-MCNC: 2.2 G/DL (ref 1.5–4.5)
GLUCOSE SERPL-MCNC: 131 MG/DL (ref 65–99)
HBA1C MFR BLD: 6.8 % (ref 4.8–5.6)
HCT VFR BLD AUTO: 43.4 % (ref 37.5–51)
HDLC SERPL-MCNC: 43 MG/DL
HGB BLD-MCNC: 14.5 G/DL (ref 13–17.7)
IMM GRANULOCYTES # BLD AUTO: 0 X10E3/UL (ref 0–0.1)
IMM GRANULOCYTES NFR BLD AUTO: 0 %
LDLC SERPL CALC-MCNC: 58 MG/DL (ref 0–99)
LYMPHOCYTES # BLD AUTO: 1.2 X10E3/UL (ref 0.7–3.1)
LYMPHOCYTES NFR BLD AUTO: 28 %
MCH RBC QN AUTO: 30.9 PG (ref 26.6–33)
MCHC RBC AUTO-ENTMCNC: 33.4 G/DL (ref 31.5–35.7)
MCV RBC AUTO: 93 FL (ref 79–97)
MONOCYTES # BLD AUTO: 0.3 X10E3/UL (ref 0.1–0.9)
MONOCYTES NFR BLD AUTO: 8 %
NEUTROPHILS # BLD AUTO: 2.4 X10E3/UL (ref 1.4–7)
NEUTROPHILS NFR BLD AUTO: 58 %
PLATELET # BLD AUTO: 197 X10E3/UL (ref 150–450)
POTASSIUM SERPL-SCNC: 4.7 MMOL/L (ref 3.5–5.2)
PROT SERPL-MCNC: 6.3 G/DL (ref 6–8.5)
RBC # BLD AUTO: 4.69 X10E6/UL (ref 4.14–5.8)
SODIUM SERPL-SCNC: 139 MMOL/L (ref 134–144)
TRIGL SERPL-MCNC: 101 MG/DL (ref 0–149)
TSH SERPL DL<=0.005 MIU/L-ACNC: 2 UIU/ML (ref 0.45–4.5)
VLDLC SERPL CALC-MCNC: 19 MG/DL (ref 5–40)
WBC # BLD AUTO: 4.2 X10E3/UL (ref 3.4–10.8)

## 2022-05-19 NOTE — PROGRESS NOTES
Follow Up Office Visit      Date of Visit:  2022   Patient Name: Arie Fournier  : 1935   MRN: [id number]     Chief Complaint:  No chief complaint on file.      History of Present Illness: Arie Fournier is a 87 y.o. male who is here today for follow up.  Patient comes in today with nonspecific symptoms with some fatigue.  Some dizziness noted.  Somewhat described as vertigo some as lightheadedness.  Blood pressure is slightly elevated today but normal at home.  Fatigue is an ongoing complaint.  Medications reviewed.        Subjective      Review of Systems:   Review of Systems   Constitutional: Positive for fatigue. Negative for fever.   HENT: Negative for congestion and ear pain.    Respiratory: Negative for apnea, cough, chest tightness and shortness of breath.    Cardiovascular: Negative for chest pain.   Gastrointestinal: Negative for abdominal pain, constipation, diarrhea and nausea.   Musculoskeletal: Negative for arthralgias.   Neurological: Positive for dizziness.   Psychiatric/Behavioral: Negative for depressed mood and stress.       Past Medical History:   Past Medical History:   Diagnosis Date   • Anxiety    • Benign essential hypertension    • Benign prostatic hyperplasia     WITH URINARY FREQUENCY   • Benign prostatic hypertrophy    • Coronary artery disease    • Depressive disorder    • DJD (degenerative joint disease)     INVOLVING MULTIPLE JOINTS   • Dyslipidemia     In 2016:  Total cholesterol 137, triglycerides 180, HDL 38, LDL 63.   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    • Osteoarthritis    • Primary osteoarthritis involving multiple joints    • Recurrent major depressive episodes, moderate (HCC)    • Type 2 diabetes mellitus (HCC)     WITHOUT HYPERGLYCEMIA WITHOUT LONG TERM CURRENT USE OF  INSULIN   • Type 2 diabetes, onset 2001.  In 2016, A1c was 6.5.        Past Surgical History:   Past Surgical History:   Procedure Laterality Date   • CYST REMOVAL  1976    BACK   • HAND SURGERY     • TONSILLECTOMY     • TUMOR EXCISION      skin excision for tumor       Family History:   Family History   Problem Relation Age of Onset   • Stroke Mother    • Hypertension Mother    • Stroke Father    • Hypertension Father    • Diabetes Brother    • Hypertension Brother    • Prostate cancer Brother    • Hyperlipidemia Brother        Social History:   Social History     Socioeconomic History   • Marital status:    Tobacco Use   • Smoking status: Never Smoker   • Smokeless tobacco: Never Used   Substance and Sexual Activity   • Alcohol use: No   • Drug use: No   • Sexual activity: Defer       Medications:     Current Outpatient Medications:   •  ARIPIPRAZOLE PO, Take 1 tablet by mouth Daily., Disp: , Rfl:   •  atorvastatin (LIPITOR) 80 MG tablet, Take 80 mg by mouth Daily. PT SAYS HE TAKES VARIATIONS OF THIS DOSAGE, Disp: , Rfl:   •  cetirizine (ZyrTEC) 10 MG tablet, Take 10 mg by mouth Daily., Disp: , Rfl:   •  clonazePAM (KlonoPIN) 1 MG tablet, Take 1 mg by mouth As Needed., Disp: , Rfl: 2  •  coenzyme Q10 100 MG capsule, Take 200 mg by mouth Daily., Disp: , Rfl:   •  finasteride (PROSCAR) 5 MG tablet, Take 5 mg by mouth Daily., Disp: , Rfl:   •  Fluticasone Furoate-Vilanterol (BREO ELLIPTA IN), Inhale As Needed., Disp: , Rfl:   •  furosemide (LASIX) 20 MG tablet, Take 20 mg by mouth Daily., Disp: , Rfl:   •  HYDROcodone-acetaminophen (NORCO) 5-325 MG per tablet, Take 1 tablet by mouth Every 6 (Six) Hours As Needed., Disp: , Rfl:   •  ipratropium (ATROVENT) 0.06 % nasal spray, USE 2 SPRAY(S) IN EACH NOSTRIL THREE TIMES DAILY, Disp: , Rfl:   •  metFORMIN (GLUCOPHAGE) 1000 MG tablet, Take 1,000 mg by mouth 2 (Two) Times a Day With Meals., Disp: , Rfl:   •  methylcellulose, Laxative, (CITRUCEL) 500 MG tablet tablet,  Take 2 tablets by mouth As Needed., Disp: , Rfl:   •  metoprolol tartrate (LOPRESSOR) 25 MG tablet, Take 1 tablet by mouth Every 12 (Twelve) Hours. (Patient taking differently: Take 25 mg by mouth Every 12 (Twelve) Hours. 0.5 tablet twice daily), Disp: 180 tablet, Rfl: 3  •  montelukast (SINGULAIR) 10 MG tablet, Take 10 mg by mouth Every Evening., Disp: , Rfl:   •  Multiple Vitamin (MULTI VITAMIN PO), Take  by mouth Daily., Disp: , Rfl:   •  mupirocin (Bactroban) 2 % cream, Apply 1 application topically to the appropriate area as directed 3 (Three) Times a Day., Disp: 15 g, Rfl: 0  •  Omega-3 Fatty Acids (FISH OIL) 1000 MG capsule capsule, Take 1,200 mg by mouth Daily With Breakfast., Disp: , Rfl:   •  pioglitazone (ACTOS) 30 MG tablet, TAKE 1/2 TO 1 (ONE-HALF TO ONE) TABLET BY MOUTH ONCE DAILY, Disp: 90 tablet, Rfl: 0  •  potassium chloride (K-DUR,KLOR-CON) 20 MEQ CR tablet, Take 20 mEq by mouth Daily., Disp: , Rfl:   •  Saw Palmetto, Serenoa repens, 450 MG capsule, Take  by mouth 2 (Two) Times a Day., Disp: , Rfl:   •  sucralfate (CARAFATE) 1 g tablet, Take 1 g by mouth 4 (Four) Times a Day As Needed., Disp: , Rfl:   •  tamsulosin (FLOMAX) 0.4 MG capsule 24 hr capsule, TAKE 1 CAPSULE BY MOUTH ONCE DAILY 30 MINUTES FOLLOWING THE SAME MEAL EACH DAY, Disp: 90 capsule, Rfl: 0  •  venlafaxine XR (EFFEXOR-XR) 75 MG 24 hr capsule, TAKE 1 CAPSULE BY MOUTH ONCE DAILY WITH FOOD, Disp: 90 capsule, Rfl: 0    Allergies:   Allergies   Allergen Reactions   • Other      Horse Serum   • Tetanus Immune Globulin Unknown - Low Severity       Objective     Physical Exam:  Vital Signs:   Vitals:    05/18/22 1501   BP: 150/80   Pulse: 68   Weight: 110 kg (242 lb)     Body mass index is 31.93 kg/m².     Physical Exam  Vitals and nursing note reviewed.   Constitutional:       General: He is not in acute distress.     Appearance: Normal appearance. He is not ill-appearing.   HENT:      Head: Normocephalic and atraumatic.      Right Ear:  Tympanic membrane and ear canal normal.      Left Ear: Tympanic membrane and ear canal normal.      Nose: Nose normal.   Cardiovascular:      Rate and Rhythm: Normal rate and regular rhythm.      Heart sounds: Normal heart sounds.   Pulmonary:      Effort: Pulmonary effort is normal.      Breath sounds: Normal breath sounds.   Neurological:      Mental Status: He is alert and oriented to person, place, and time. Mental status is at baseline.   Psychiatric:         Mood and Affect: Mood normal.         Procedures    BMI is >= 30 and <= 34.9 (Class 1 obesity). The following options were offered after discussion: nutrition counseling/recommendations       Assessment / Plan      Assessment/Plan:   Diagnoses and all orders for this visit:    1. Benign essential HTN (Primary)  -     CBC Auto Differential; Future  -     Comprehensive Metabolic Panel; Future  -     Lipid Panel; Future  -     Hemoglobin A1c; Future  -     TSH; Future    2. Type 2 diabetes mellitus without complication, without long-term current use of insulin (HCC)  -     CBC Auto Differential; Future  -     Comprehensive Metabolic Panel; Future  -     Lipid Panel; Future  -     Hemoglobin A1c; Future  -     TSH; Future         We are going to check blood work due to his hypertension and diabetes to see if anything new is going on.  I suspect most of his symptoms are more age related and deconditioning.    Follow Up:   No follow-ups on file.    Mike Bhardwaj  [facility] [place] [facility] [place]

## 2022-06-08 RX ORDER — POTASSIUM CHLORIDE 20 MEQ/1
TABLET, EXTENDED RELEASE ORAL
Qty: 90 TABLET | Refills: 0 | Status: SHIPPED | OUTPATIENT
Start: 2022-06-08 | End: 2022-09-16

## 2022-07-11 RX ORDER — PIOGLITAZONEHYDROCHLORIDE 30 MG/1
TABLET ORAL
Qty: 90 TABLET | Refills: 0 | Status: SHIPPED | OUTPATIENT
Start: 2022-07-11 | End: 2022-10-14

## 2022-07-29 ENCOUNTER — OFFICE VISIT (OUTPATIENT)
Dept: FAMILY MEDICINE CLINIC | Facility: CLINIC | Age: 87
End: 2022-07-29

## 2022-07-29 DIAGNOSIS — L57.0 ACTINIC KERATOSIS: Primary | ICD-10-CM

## 2022-07-29 PROCEDURE — 99213 OFFICE O/P EST LOW 20 MIN: CPT | Performed by: FAMILY MEDICINE

## 2022-07-31 NOTE — PROGRESS NOTES
Follow Up Office Visit      Date of Visit:  2022   Patient Name: Arie Fournier  : 1935   MRN: [id number]     Chief Complaint:  No chief complaint on file.      History of Present Illness: Arie Fournier is a 87 y.o. male who is here today for multiple skin lesions that he wanted evaluated.  These have changed a bit over time.  Crusty.  Located on the face and ears.        Subjective      Review of Systems:   Review of Systems   Constitutional: Negative for fatigue and fever.   HENT: Negative for congestion and ear pain.    Respiratory: Negative for apnea, cough, chest tightness and shortness of breath.    Cardiovascular: Negative for chest pain.   Gastrointestinal: Negative for abdominal pain, constipation, diarrhea and nausea.   Musculoskeletal: Negative for arthralgias.   Skin: Positive for skin lesions.   Psychiatric/Behavioral: Negative for depressed mood and stress.       Past Medical History:   Past Medical History:   Diagnosis Date   • Anxiety    • Benign essential hypertension    • Benign prostatic hyperplasia     WITH URINARY FREQUENCY   • Benign prostatic hypertrophy    • Coronary artery disease    • Depressive disorder    • DJD (degenerative joint disease)     INVOLVING MULTIPLE JOINTS   • Dyslipidemia     In 2016:  Total cholesterol 137, triglycerides 180, HDL 38, LDL 63.   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    • Osteoarthritis    • Primary osteoarthritis involving multiple joints    • Recurrent major depressive episodes, moderate (HCC)    • Type 2 diabetes mellitus (HCC)     WITHOUT HYPERGLYCEMIA WITHOUT LONG TERM CURRENT USE OF INSULIN   • Type 2 diabetes, onset .  In 2016, A1c was 6.5.        Past Surgical History:   Past Surgical History:   Procedure Laterality Date   • CYST REMOVAL       BACK   • HAND SURGERY     • TONSILLECTOMY     • TUMOR EXCISION      skin excision for tumor       Family History:   Family History   Problem Relation Age of Onset   • Stroke Mother    • Hypertension Mother    • Stroke Father    • Hypertension Father    • Diabetes Brother    • Hypertension Brother    • Prostate cancer Brother    • Hyperlipidemia Brother        Social History:   Social History     Socioeconomic History   • Marital status:    Tobacco Use   • Smoking status: Never Smoker   • Smokeless tobacco: Never Used   Substance and Sexual Activity   • Alcohol use: No   • Drug use: No   • Sexual activity: Defer       Medications:     Current Outpatient Medications:   •  ARIPIPRAZOLE PO, Take 1 tablet by mouth Daily., Disp: , Rfl:   •  atorvastatin (LIPITOR) 80 MG tablet, Take 80 mg by mouth Daily. PT SAYS HE TAKES VARIATIONS OF THIS DOSAGE, Disp: , Rfl:   •  cetirizine (ZyrTEC) 10 MG tablet, Take 10 mg by mouth Daily., Disp: , Rfl:   •  clonazePAM (KlonoPIN) 1 MG tablet, Take 1 mg by mouth As Needed., Disp: , Rfl: 2  •  coenzyme Q10 100 MG capsule, Take 200 mg by mouth Daily., Disp: , Rfl:   •  finasteride (PROSCAR) 5 MG tablet, Take 5 mg by mouth Daily., Disp: , Rfl:   •  Fluticasone Furoate-Vilanterol (BREO ELLIPTA IN), Inhale As Needed., Disp: , Rfl:   •  furosemide (LASIX) 20 MG tablet, Take 20 mg by mouth Daily., Disp: , Rfl:   •  HYDROcodone-acetaminophen (NORCO) 5-325 MG per tablet, Take 1 tablet by mouth Every 6 (Six) Hours As Needed., Disp: , Rfl:   •  ipratropium (ATROVENT) 0.06 % nasal spray, USE 2 SPRAY(S) IN EACH NOSTRIL THREE TIMES DAILY, Disp: , Rfl:   •  metFORMIN (GLUCOPHAGE) 1000 MG tablet, Take 1,000 mg by mouth 2 (Two) Times a Day With Meals., Disp: , Rfl:   •  methylcellulose, Laxative, (CITRUCEL) 500 MG tablet tablet, Take 2 tablets by mouth As Needed., Disp: , Rfl:   •  metoprolol tartrate (LOPRESSOR) 25 MG tablet, Take 1 tablet by mouth Every 12 (Twelve) Hours. (Patient taking  differently: Take 25 mg by mouth Every 12 (Twelve) Hours. 0.5 tablet twice daily), Disp: 180 tablet, Rfl: 3  •  montelukast (SINGULAIR) 10 MG tablet, Take 10 mg by mouth Every Evening., Disp: , Rfl:   •  Multiple Vitamin (MULTI VITAMIN PO), Take  by mouth Daily., Disp: , Rfl:   •  mupirocin (Bactroban) 2 % cream, Apply 1 application topically to the appropriate area as directed 3 (Three) Times a Day., Disp: 15 g, Rfl: 0  •  Omega-3 Fatty Acids (FISH OIL) 1000 MG capsule capsule, Take 1,200 mg by mouth Daily With Breakfast., Disp: , Rfl:   •  pioglitazone (ACTOS) 30 MG tablet, TAKE 1/2 TO 1 TABLET BY MOUTH ONCE DAILY, Disp: 90 tablet, Rfl: 0  •  potassium chloride (K-DUR,KLOR-CON) 20 MEQ CR tablet, Take 1 tablet by mouth once daily with food, Disp: 90 tablet, Rfl: 0  •  Saw Palmetto, Serenoa repens, 450 MG capsule, Take  by mouth 2 (Two) Times a Day., Disp: , Rfl:   •  sucralfate (CARAFATE) 1 g tablet, Take 1 g by mouth 4 (Four) Times a Day As Needed., Disp: , Rfl:   •  tamsulosin (FLOMAX) 0.4 MG capsule 24 hr capsule, TAKE 1 CAPSULE BY MOUTH ONCE DAILY 30 MINUTES FOLLOWING THE SAME MEAL EACH DAY, Disp: 90 capsule, Rfl: 0  •  venlafaxine XR (EFFEXOR-XR) 75 MG 24 hr capsule, TAKE 1 CAPSULE BY MOUTH ONCE DAILY WITH FOOD, Disp: 90 capsule, Rfl: 0    Allergies:   Allergies   Allergen Reactions   • Other      Horse Serum   • Tetanus Immune Globulin Unknown - Low Severity       Objective     Physical Exam:  Vital Signs: There were no vitals filed for this visit.  There is no height or weight on file to calculate BMI.     Physical Exam  Vitals and nursing note reviewed.   Constitutional:       General: He is not in acute distress.     Appearance: Normal appearance. He is not ill-appearing.   HENT:      Head: Normocephalic and atraumatic.      Right Ear: Tympanic membrane and ear canal normal.      Left Ear: Tympanic membrane and ear canal normal.      Nose: Nose normal.   Cardiovascular:      Rate and Rhythm: Normal rate  and regular rhythm.      Heart sounds: Normal heart sounds.   Pulmonary:      Effort: Pulmonary effort is normal.      Breath sounds: Normal breath sounds.   Skin:     Comments: Multiple actinic keratosis on the face   Neurological:      Mental Status: He is alert and oriented to person, place, and time. Mental status is at baseline.   Psychiatric:         Mood and Affect: Mood normal.         Cryotherapy, Skin Lesion    Date/Time: 7/31/2022 4:00 PM  Performed by: Mike Bhardwaj MD  Authorized by: Mike Bhardwaj MD   Local anesthesia used: no    Anesthesia:  Local anesthesia used: no    Sedation:  Patient sedated: no    Patient tolerance: patient tolerated the procedure well with no immediate complications  Comments: Multiple actinic keratosis were frozen off of the face and ears.  Total was numbered at 7.            Assessment / Plan      Assessment/Plan:   Diagnoses and all orders for this visit:    1. Actinic keratosis (Primary)    Other orders  -     Cryotherapy, Skin Lesion         Multiple actinic keratosis were frozen today.  Cryotherapy done.  Please review procedure note.    Follow Up:   No follow-ups on file.    Mike Bhardwaj  [facility] [place]

## 2022-08-04 RX ORDER — TAMSULOSIN HYDROCHLORIDE 0.4 MG/1
CAPSULE ORAL
Qty: 90 CAPSULE | Refills: 0 | Status: SHIPPED | OUTPATIENT
Start: 2022-08-04 | End: 2022-11-01

## 2022-09-16 RX ORDER — POTASSIUM CHLORIDE 20 MEQ/1
TABLET, EXTENDED RELEASE ORAL
Qty: 90 TABLET | Refills: 0 | Status: SHIPPED | OUTPATIENT
Start: 2022-09-16 | End: 2022-11-17 | Stop reason: SDUPTHER

## 2022-09-23 RX ORDER — FUROSEMIDE 40 MG/1
TABLET ORAL
Qty: 90 TABLET | Refills: 0 | Status: SHIPPED | OUTPATIENT
Start: 2022-09-23 | End: 2022-11-17 | Stop reason: SDUPTHER

## 2022-10-03 RX ORDER — VENLAFAXINE HYDROCHLORIDE 75 MG/1
CAPSULE, EXTENDED RELEASE ORAL
Qty: 90 CAPSULE | Refills: 0 | Status: SHIPPED | OUTPATIENT
Start: 2022-10-03 | End: 2022-11-17 | Stop reason: SDUPTHER

## 2022-10-05 RX ORDER — POTASSIUM CHLORIDE 20 MEQ/1
TABLET, EXTENDED RELEASE ORAL
Qty: 90 TABLET | Refills: 0 | OUTPATIENT
Start: 2022-10-05

## 2022-10-14 RX ORDER — PIOGLITAZONEHYDROCHLORIDE 30 MG/1
TABLET ORAL
Qty: 90 TABLET | Refills: 0 | Status: SHIPPED | OUTPATIENT
Start: 2022-10-14 | End: 2022-11-17

## 2022-11-01 RX ORDER — TAMSULOSIN HYDROCHLORIDE 0.4 MG/1
CAPSULE ORAL
Qty: 90 CAPSULE | Refills: 0 | Status: SHIPPED | OUTPATIENT
Start: 2022-11-01 | End: 2022-11-17 | Stop reason: SDUPTHER

## 2022-11-17 ENCOUNTER — OFFICE VISIT (OUTPATIENT)
Dept: FAMILY MEDICINE CLINIC | Facility: CLINIC | Age: 87
End: 2022-11-17

## 2022-11-17 ENCOUNTER — TELEPHONE (OUTPATIENT)
Dept: FAMILY MEDICINE CLINIC | Facility: CLINIC | Age: 87
End: 2022-11-17

## 2022-11-17 VITALS
HEART RATE: 85 BPM | WEIGHT: 267 LBS | SYSTOLIC BLOOD PRESSURE: 140 MMHG | DIASTOLIC BLOOD PRESSURE: 76 MMHG | BODY MASS INDEX: 35.39 KG/M2 | OXYGEN SATURATION: 98 % | HEIGHT: 73 IN

## 2022-11-17 DIAGNOSIS — E11.65 TYPE 2 DIABETES MELLITUS WITH HYPERGLYCEMIA, WITHOUT LONG-TERM CURRENT USE OF INSULIN: ICD-10-CM

## 2022-11-17 DIAGNOSIS — I10 BENIGN ESSENTIAL HTN: ICD-10-CM

## 2022-11-17 DIAGNOSIS — R10.9 STOMACH PAIN: Primary | ICD-10-CM

## 2022-11-17 PROCEDURE — G0008 ADMIN INFLUENZA VIRUS VAC: HCPCS | Performed by: FAMILY MEDICINE

## 2022-11-17 PROCEDURE — 99214 OFFICE O/P EST MOD 30 MIN: CPT | Performed by: FAMILY MEDICINE

## 2022-11-17 PROCEDURE — 36415 COLL VENOUS BLD VENIPUNCTURE: CPT | Performed by: FAMILY MEDICINE

## 2022-11-17 PROCEDURE — 90662 IIV NO PRSV INCREASED AG IM: CPT | Performed by: FAMILY MEDICINE

## 2022-11-17 RX ORDER — VENLAFAXINE HYDROCHLORIDE 75 MG/1
75 CAPSULE, EXTENDED RELEASE ORAL DAILY
Qty: 90 CAPSULE | Refills: 1 | Status: SHIPPED | OUTPATIENT
Start: 2022-11-17

## 2022-11-17 RX ORDER — TAMSULOSIN HYDROCHLORIDE 0.4 MG/1
1 CAPSULE ORAL DAILY
Qty: 90 CAPSULE | Refills: 1 | Status: SHIPPED | OUTPATIENT
Start: 2022-11-17

## 2022-11-17 RX ORDER — POTASSIUM CHLORIDE 20 MEQ/1
20 TABLET, EXTENDED RELEASE ORAL DAILY
Qty: 90 TABLET | Refills: 1 | Status: SHIPPED | OUTPATIENT
Start: 2022-11-17 | End: 2023-01-27 | Stop reason: SDUPTHER

## 2022-11-17 RX ORDER — FUROSEMIDE 40 MG/1
40 TABLET ORAL DAILY
Qty: 90 TABLET | Refills: 1 | Status: SHIPPED | OUTPATIENT
Start: 2022-11-17

## 2022-11-17 NOTE — TELEPHONE ENCOUNTER
Pt came in and stated the jardiance was not covered and was over $500, we only have 10mg samples in stock

## 2022-11-17 NOTE — PROGRESS NOTES
Follow Up Office Visit      Date of Visit:  2022   Patient Name: Arie Fournier  : 1935   MRN: [id number]     Chief Complaint:  No chief complaint on file.      History of Present Illness: Arie Fournier is a 87 y.o. male who is here today for follow up.  Patient comes in for multiple reasons.  He is concerned about some possible side effects with his metformin and Actos.  Having some upset stomach as well as edema.        Subjective      Review of Systems:   Review of Systems   Constitutional: Negative for fatigue and fever.   HENT: Negative for congestion and ear pain.    Respiratory: Negative for apnea, cough, chest tightness and shortness of breath.    Cardiovascular: Negative for chest pain.   Gastrointestinal: Negative for abdominal pain, constipation, diarrhea and nausea.   Musculoskeletal: Negative for arthralgias.   Psychiatric/Behavioral: Negative for depressed mood and stress.       Past Medical History:   Past Medical History:   Diagnosis Date   • Anxiety    • Benign essential hypertension    • Benign prostatic hyperplasia     WITH URINARY FREQUENCY   • Benign prostatic hypertrophy    • Coronary artery disease    • Depressive disorder    • DJD (degenerative joint disease)     INVOLVING MULTIPLE JOINTS   • Dyslipidemia     In 2016:  Total cholesterol 137, triglycerides 180, HDL 38, LDL 63.   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    • Osteoarthritis    • Primary osteoarthritis involving multiple joints    • Recurrent major depressive episodes, moderate (HCC)    • Type 2 diabetes mellitus (HCC)     WITHOUT HYPERGLYCEMIA WITHOUT LONG TERM CURRENT USE OF INSULIN   • Type 2 diabetes, onset .  In 2016, A1c was 6.5.        Past Surgical History:   Past Surgical History:   Procedure Laterality Date   • CYST  REMOVAL  1976    BACK   • HAND SURGERY     • TONSILLECTOMY     • TUMOR EXCISION      skin excision for tumor       Family History:   Family History   Problem Relation Age of Onset   • Stroke Mother    • Hypertension Mother    • Stroke Father    • Hypertension Father    • Diabetes Brother    • Hypertension Brother    • Prostate cancer Brother    • Hyperlipidemia Brother        Social History:   Social History     Socioeconomic History   • Marital status:    Tobacco Use   • Smoking status: Never   • Smokeless tobacco: Never   Substance and Sexual Activity   • Alcohol use: No   • Drug use: No   • Sexual activity: Defer       Medications:     Current Outpatient Medications:   •  ARIPIPRAZOLE PO, Take 1 tablet by mouth Daily., Disp: , Rfl:   •  atorvastatin (LIPITOR) 80 MG tablet, Take 80 mg by mouth Daily. PT SAYS HE TAKES VARIATIONS OF THIS DOSAGE, Disp: , Rfl:   •  cetirizine (ZyrTEC) 10 MG tablet, Take 10 mg by mouth Daily., Disp: , Rfl:   •  clonazePAM (KlonoPIN) 1 MG tablet, Take 1 mg by mouth As Needed., Disp: , Rfl: 2  •  coenzyme Q10 100 MG capsule, Take 200 mg by mouth Daily., Disp: , Rfl:   •  finasteride (PROSCAR) 5 MG tablet, Take 5 mg by mouth Daily., Disp: , Rfl:   •  Fluticasone Furoate-Vilanterol (BREO ELLIPTA IN), Inhale As Needed., Disp: , Rfl:   •  furosemide (LASIX) 40 MG tablet, Take 1 tablet by mouth Daily., Disp: 90 tablet, Rfl: 1  •  HYDROcodone-acetaminophen (NORCO) 5-325 MG per tablet, Take 1 tablet by mouth Every 6 (Six) Hours As Needed., Disp: , Rfl:   •  ipratropium (ATROVENT) 0.06 % nasal spray, USE 2 SPRAY(S) IN EACH NOSTRIL THREE TIMES DAILY, Disp: , Rfl:   •  methylcellulose, Laxative, (CITRUCEL) 500 MG tablet tablet, Take 2 tablets by mouth As Needed., Disp: , Rfl:   •  metoprolol tartrate (LOPRESSOR) 25 MG tablet, Take 1 tablet by mouth Every 12 (Twelve) Hours. (Patient taking differently: Take 25 mg by mouth Every 12 (Twelve) Hours. 0.5 tablet twice daily), Disp: 180 tablet,  "Rfl: 3  •  montelukast (SINGULAIR) 10 MG tablet, Take 10 mg by mouth Every Evening., Disp: , Rfl:   •  Multiple Vitamin (MULTI VITAMIN PO), Take  by mouth Daily., Disp: , Rfl:   •  mupirocin (Bactroban) 2 % cream, Apply 1 application topically to the appropriate area as directed 3 (Three) Times a Day., Disp: 15 g, Rfl: 0  •  Omega-3 Fatty Acids (FISH OIL) 1000 MG capsule capsule, Take 1,200 mg by mouth Daily With Breakfast., Disp: , Rfl:   •  potassium chloride (K-DUR,KLOR-CON) 20 MEQ CR tablet, Take 1 tablet by mouth Daily., Disp: 90 tablet, Rfl: 1  •  Saw Palmetto, Serenoa repens, 450 MG capsule, Take  by mouth 2 (Two) Times a Day., Disp: , Rfl:   •  sucralfate (CARAFATE) 1 g tablet, Take 1 g by mouth 4 (Four) Times a Day As Needed., Disp: , Rfl:   •  tamsulosin (FLOMAX) 0.4 MG capsule 24 hr capsule, Take 1 capsule by mouth Daily., Disp: 90 capsule, Rfl: 1  •  venlafaxine XR (EFFEXOR-XR) 75 MG 24 hr capsule, Take 1 capsule by mouth Daily. with food, Disp: 90 capsule, Rfl: 1  •  empagliflozin (Jardiance) 25 MG tablet tablet, Take 1 tablet by mouth Daily., Disp: 30 tablet, Rfl: 3    Allergies:   Allergies   Allergen Reactions   • Other      Horse Serum   • Tetanus Immune Globulin Unknown - Low Severity       Objective     Physical Exam:  Vital Signs:   Vitals:    11/17/22 1011   BP: 140/76   Pulse: 85   SpO2: 98%   Weight: 121 kg (267 lb)   Height: 185.4 cm (73\")     Body mass index is 35.23 kg/m².     Physical Exam  Vitals and nursing note reviewed.   Constitutional:       General: He is not in acute distress.     Appearance: Normal appearance. He is not ill-appearing.   HENT:      Head: Normocephalic and atraumatic.      Right Ear: Tympanic membrane and ear canal normal.      Left Ear: Tympanic membrane and ear canal normal.      Nose: Nose normal.   Cardiovascular:      Rate and Rhythm: Normal rate and regular rhythm.      Heart sounds: Normal heart sounds.   Pulmonary:      Effort: Pulmonary effort is normal.    "   Breath sounds: Normal breath sounds.   Neurological:      Mental Status: He is alert and oriented to person, place, and time. Mental status is at baseline.   Psychiatric:         Mood and Affect: Mood normal.         Procedures      Assessment / Plan      Assessment/Plan:   Diagnoses and all orders for this visit:    1. Stomach pain (Primary)  -     Cancel: Ambulatory Referral to Gastroenterology    2. Type 2 diabetes mellitus with hyperglycemia, without long-term current use of insulin (HCC)  -     CBC Auto Differential; Future  -     Comprehensive Metabolic Panel; Future  -     Lipid Panel; Future  -     TSH; Future  -     Hemoglobin A1c; Future  -     CBC Auto Differential  -     Comprehensive Metabolic Panel  -     Lipid Panel  -     TSH  -     Hemoglobin A1c    3. Benign essential HTN  -     CBC Auto Differential; Future  -     Comprehensive Metabolic Panel; Future  -     Lipid Panel; Future  -     TSH; Future  -     Hemoglobin A1c; Future  -     CBC Auto Differential  -     Comprehensive Metabolic Panel  -     Lipid Panel  -     TSH  -     Hemoglobin A1c    Other orders  -     Fluzone High-Dose 65+yrs (8556-9742)  -     empagliflozin (Jardiance) 25 MG tablet tablet; Take 1 tablet by mouth Daily.  Dispense: 30 tablet; Refill: 3  -     furosemide (LASIX) 40 MG tablet; Take 1 tablet by mouth Daily.  Dispense: 90 tablet; Refill: 1  -     potassium chloride (K-DUR,KLOR-CON) 20 MEQ CR tablet; Take 1 tablet by mouth Daily.  Dispense: 90 tablet; Refill: 1  -     tamsulosin (FLOMAX) 0.4 MG capsule 24 hr capsule; Take 1 capsule by mouth Daily.  Dispense: 90 capsule; Refill: 1  -     venlafaxine XR (EFFEXOR-XR) 75 MG 24 hr capsule; Take 1 capsule by mouth Daily. with food  Dispense: 90 capsule; Refill: 1         Medication refills given on those that were needed.  Gave also Farxiga for his diabetes.  Stop metformin and Actos.    Follow Up:   No follow-ups on file.    Mike Bhardwaj  [facility] [place]

## 2022-11-18 LAB
ALBUMIN SERPL-MCNC: 4.5 G/DL (ref 3.6–4.6)
ALBUMIN/GLOB SERPL: 2 {RATIO} (ref 1.2–2.2)
ALP SERPL-CCNC: 70 IU/L (ref 44–121)
ALT SERPL-CCNC: 18 IU/L (ref 0–44)
AST SERPL-CCNC: 18 IU/L (ref 0–40)
BASOPHILS # BLD AUTO: 0 X10E3/UL (ref 0–0.2)
BASOPHILS NFR BLD AUTO: 1 %
BILIRUB SERPL-MCNC: 0.7 MG/DL (ref 0–1.2)
BUN SERPL-MCNC: 26 MG/DL (ref 8–27)
BUN/CREAT SERPL: 31 (ref 10–24)
CALCIUM SERPL-MCNC: 9.4 MG/DL (ref 8.6–10.2)
CHLORIDE SERPL-SCNC: 102 MMOL/L (ref 96–106)
CHOLEST SERPL-MCNC: 123 MG/DL (ref 100–199)
CO2 SERPL-SCNC: 27 MMOL/L (ref 20–29)
CREAT SERPL-MCNC: 0.85 MG/DL (ref 0.76–1.27)
EGFRCR SERPLBLD CKD-EPI 2021: 85 ML/MIN/1.73
EOSINOPHIL # BLD AUTO: 0.6 X10E3/UL (ref 0–0.4)
EOSINOPHIL NFR BLD AUTO: 10 %
ERYTHROCYTE [DISTWIDTH] IN BLOOD BY AUTOMATED COUNT: 13.6 % (ref 11.6–15.4)
GLOBULIN SER CALC-MCNC: 2.2 G/DL (ref 1.5–4.5)
GLUCOSE SERPL-MCNC: 121 MG/DL (ref 70–99)
HBA1C MFR BLD: 6.7 % (ref 4.8–5.6)
HCT VFR BLD AUTO: 42.6 % (ref 37.5–51)
HDLC SERPL-MCNC: 49 MG/DL
HGB BLD-MCNC: 14.5 G/DL (ref 13–17.7)
IMM GRANULOCYTES # BLD AUTO: 0 X10E3/UL (ref 0–0.1)
IMM GRANULOCYTES NFR BLD AUTO: 0 %
LDLC SERPL CALC-MCNC: 59 MG/DL (ref 0–99)
LYMPHOCYTES # BLD AUTO: 1.3 X10E3/UL (ref 0.7–3.1)
LYMPHOCYTES NFR BLD AUTO: 25 %
MCH RBC QN AUTO: 30.9 PG (ref 26.6–33)
MCHC RBC AUTO-ENTMCNC: 34 G/DL (ref 31.5–35.7)
MCV RBC AUTO: 91 FL (ref 79–97)
MONOCYTES # BLD AUTO: 0.4 X10E3/UL (ref 0.1–0.9)
MONOCYTES NFR BLD AUTO: 8 %
NEUTROPHILS # BLD AUTO: 3 X10E3/UL (ref 1.4–7)
NEUTROPHILS NFR BLD AUTO: 56 %
PLATELET # BLD AUTO: 173 X10E3/UL (ref 150–450)
POTASSIUM SERPL-SCNC: 4.7 MMOL/L (ref 3.5–5.2)
PROT SERPL-MCNC: 6.7 G/DL (ref 6–8.5)
RBC # BLD AUTO: 4.7 X10E6/UL (ref 4.14–5.8)
SODIUM SERPL-SCNC: 143 MMOL/L (ref 134–144)
TRIGL SERPL-MCNC: 74 MG/DL (ref 0–149)
TSH SERPL DL<=0.005 MIU/L-ACNC: 3.29 UIU/ML (ref 0.45–4.5)
VLDLC SERPL CALC-MCNC: 15 MG/DL (ref 5–40)
WBC # BLD AUTO: 5.3 X10E3/UL (ref 3.4–10.8)

## 2022-11-20 RX ORDER — DAPAGLIFLOZIN 10 MG/1
1 TABLET, FILM COATED ORAL DAILY
Qty: 30 TABLET | Refills: 5 | Status: SHIPPED | OUTPATIENT
Start: 2022-11-20 | End: 2022-11-20

## 2022-11-20 RX ORDER — DAPAGLIFLOZIN 10 MG/1
1 TABLET, FILM COATED ORAL DAILY
Qty: 30 TABLET | Refills: 5 | Status: SHIPPED | OUTPATIENT
Start: 2022-11-20 | End: 2022-11-23 | Stop reason: SDUPTHER

## 2022-11-23 ENCOUNTER — TELEPHONE (OUTPATIENT)
Dept: FAMILY MEDICINE CLINIC | Facility: CLINIC | Age: 87
End: 2022-11-23

## 2022-11-23 RX ORDER — DAPAGLIFLOZIN 10 MG/1
1 TABLET, FILM COATED ORAL DAILY
Qty: 30 TABLET | Refills: 5 | Status: SHIPPED | OUTPATIENT
Start: 2022-11-23 | End: 2022-12-16

## 2022-12-16 ENCOUNTER — OFFICE VISIT (OUTPATIENT)
Dept: FAMILY MEDICINE CLINIC | Facility: CLINIC | Age: 87
End: 2022-12-16

## 2022-12-16 VITALS
BODY MASS INDEX: 36.05 KG/M2 | OXYGEN SATURATION: 96 % | HEART RATE: 71 BPM | SYSTOLIC BLOOD PRESSURE: 142 MMHG | WEIGHT: 272 LBS | HEIGHT: 73 IN | DIASTOLIC BLOOD PRESSURE: 80 MMHG

## 2022-12-16 DIAGNOSIS — R10.9 STOMACH PAIN: ICD-10-CM

## 2022-12-16 DIAGNOSIS — F51.01 PRIMARY INSOMNIA: Primary | ICD-10-CM

## 2022-12-16 PROCEDURE — 99214 OFFICE O/P EST MOD 30 MIN: CPT | Performed by: FAMILY MEDICINE

## 2022-12-16 RX ORDER — SUCRALFATE 1 G/1
1 TABLET ORAL 2 TIMES DAILY WITH MEALS
Qty: 60 TABLET | Refills: 1 | Status: SHIPPED | OUTPATIENT
Start: 2022-12-16 | End: 2023-01-30

## 2022-12-16 RX ORDER — TEMAZEPAM 7.5 MG/1
7.5 CAPSULE ORAL NIGHTLY PRN
Qty: 30 CAPSULE | Refills: 2 | Status: SHIPPED | OUTPATIENT
Start: 2022-12-16 | End: 2023-03-30

## 2022-12-16 NOTE — PROGRESS NOTES
Follow Up Office Visit      Date of Visit:  2022   Patient Name: Arie Fournier  : 1935   MRN: [id number]     Chief Complaint:    Chief Complaint   Patient presents with   • Follow-up       History of Present Illness: Arie Fournier is a 87 y.o. male who is here today for follow up.  Patient seen today in follow-up of his stomachache.  We had tried to stop pioglitazone and metformin and start Farxiga for his sugars in hopes that this may make his stomachache better.  He was not able to afford the Farxiga so he continue the metformin and pioglitazone.  Patient also suffering a great deal from insomnia.  He has tried multiple medications over time with minimal relief.        Subjective      Review of Systems:   Review of Systems   Constitutional: Negative for fatigue and fever.   HENT: Negative for congestion and ear pain.    Respiratory: Negative for apnea, cough, chest tightness and shortness of breath.    Cardiovascular: Negative for chest pain.   Gastrointestinal: Positive for abdominal pain and nausea. Negative for constipation and diarrhea.   Musculoskeletal: Negative for arthralgias.   Psychiatric/Behavioral: Negative for depressed mood and stress.       Past Medical History:   Past Medical History:   Diagnosis Date   • Anxiety    • Benign essential hypertension    • Benign prostatic hyperplasia     WITH URINARY FREQUENCY   • Benign prostatic hypertrophy    • Coronary artery disease    • Depressive disorder    • DJD (degenerative joint disease)     INVOLVING MULTIPLE JOINTS   • Dyslipidemia     In 2016:  Total cholesterol 137, triglycerides 180, HDL 38, LDL 63.   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    • Osteoarthritis    • Primary osteoarthritis involving multiple joints    • Recurrent major depressive  episodes, moderate (HCC)    • Type 2 diabetes mellitus (HCC)     WITHOUT HYPERGLYCEMIA WITHOUT LONG TERM CURRENT USE OF INSULIN   • Type 2 diabetes, onset 2001.  In 2016, A1c was 6.5.        Past Surgical History:   Past Surgical History:   Procedure Laterality Date   • CYST REMOVAL  1976    BACK   • HAND SURGERY     • TONSILLECTOMY     • TUMOR EXCISION      skin excision for tumor       Family History:   Family History   Problem Relation Age of Onset   • Stroke Mother    • Hypertension Mother    • Stroke Father    • Hypertension Father    • Diabetes Brother    • Hypertension Brother    • Prostate cancer Brother    • Hyperlipidemia Brother        Social History:   Social History     Socioeconomic History   • Marital status:    Tobacco Use   • Smoking status: Never   • Smokeless tobacco: Never   Substance and Sexual Activity   • Alcohol use: No   • Drug use: No   • Sexual activity: Defer       Medications:     Current Outpatient Medications:   •  sucralfate (CARAFATE) 1 g tablet, Take 1 tablet by mouth 2 (Two) Times a Day With Meals., Disp: 60 tablet, Rfl: 1  •  ARIPIPRAZOLE PO, Take 1 tablet by mouth Daily., Disp: , Rfl:   •  atorvastatin (LIPITOR) 80 MG tablet, Take 80 mg by mouth Daily. PT SAYS HE TAKES VARIATIONS OF THIS DOSAGE, Disp: , Rfl:   •  cetirizine (ZyrTEC) 10 MG tablet, Take 10 mg by mouth Daily., Disp: , Rfl:   •  coenzyme Q10 100 MG capsule, Take 200 mg by mouth Daily., Disp: , Rfl:   •  finasteride (PROSCAR) 5 MG tablet, Take 5 mg by mouth Daily., Disp: , Rfl:   •  Fluticasone Furoate-Vilanterol (BREO ELLIPTA IN), Inhale As Needed., Disp: , Rfl:   •  furosemide (LASIX) 40 MG tablet, Take 1 tablet by mouth Daily., Disp: 90 tablet, Rfl: 1  •  HYDROcodone-acetaminophen (NORCO) 5-325 MG per tablet, Take 1 tablet by mouth Every 6 (Six) Hours As Needed., Disp: , Rfl:   •  ipratropium (ATROVENT) 0.06 % nasal spray, USE 2 SPRAY(S) IN EACH NOSTRIL THREE TIMES DAILY, Disp: , Rfl:   •  methylcellulose,  "Laxative, (CITRUCEL) 500 MG tablet tablet, Take 2 tablets by mouth As Needed., Disp: , Rfl:   •  metoprolol tartrate (LOPRESSOR) 25 MG tablet, Take 1 tablet by mouth Every 12 (Twelve) Hours. (Patient taking differently: Take 25 mg by mouth Every 12 (Twelve) Hours. 0.5 tablet twice daily), Disp: 180 tablet, Rfl: 3  •  montelukast (SINGULAIR) 10 MG tablet, Take 10 mg by mouth Every Evening., Disp: , Rfl:   •  Multiple Vitamin (MULTI VITAMIN PO), Take  by mouth Daily., Disp: , Rfl:   •  mupirocin (Bactroban) 2 % cream, Apply 1 application topically to the appropriate area as directed 3 (Three) Times a Day., Disp: 15 g, Rfl: 0  •  Omega-3 Fatty Acids (FISH OIL) 1000 MG capsule capsule, Take 1,200 mg by mouth Daily With Breakfast., Disp: , Rfl:   •  potassium chloride (K-DUR,KLOR-CON) 20 MEQ CR tablet, Take 1 tablet by mouth Daily., Disp: 90 tablet, Rfl: 1  •  Saw Palmetto, Serenoa repens, 450 MG capsule, Take  by mouth 2 (Two) Times a Day., Disp: , Rfl:   •  tamsulosin (FLOMAX) 0.4 MG capsule 24 hr capsule, Take 1 capsule by mouth Daily., Disp: 90 capsule, Rfl: 1  •  temazepam (Restoril) 7.5 MG capsule, Take 1 capsule by mouth At Night As Needed for Sleep., Disp: 30 capsule, Rfl: 2  •  venlafaxine XR (EFFEXOR-XR) 75 MG 24 hr capsule, Take 1 capsule by mouth Daily. with food, Disp: 90 capsule, Rfl: 1    Allergies:   Allergies   Allergen Reactions   • Other      Horse Serum   • Tetanus Immune Globulin Unknown - Low Severity       Objective     Physical Exam:  Vital Signs:   Vitals:    12/16/22 1109   BP: 142/80   Pulse: 71   SpO2: 96%   Weight: 123 kg (272 lb)   Height: 185.4 cm (73\")     Body mass index is 35.89 kg/m².     Physical Exam  Vitals and nursing note reviewed.   Constitutional:       General: He is not in acute distress.     Appearance: Normal appearance. He is not ill-appearing.   HENT:      Head: Normocephalic and atraumatic.      Right Ear: Tympanic membrane and ear canal normal.      Left Ear: Tympanic " membrane and ear canal normal.      Nose: Nose normal.   Cardiovascular:      Rate and Rhythm: Normal rate and regular rhythm.      Heart sounds: Normal heart sounds.   Pulmonary:      Effort: Pulmonary effort is normal.      Breath sounds: Normal breath sounds.   Neurological:      Mental Status: He is alert and oriented to person, place, and time. Mental status is at baseline.   Psychiatric:         Mood and Affect: Mood normal.         Procedures      Assessment / Plan      Assessment/Plan:   Diagnoses and all orders for this visit:    1. Primary insomnia (Primary)  -     temazepam (Restoril) 7.5 MG capsule; Take 1 capsule by mouth At Night As Needed for Sleep.  Dispense: 30 capsule; Refill: 2    2. Stomach pain    Other orders  -     sucralfate (CARAFATE) 1 g tablet; Take 1 tablet by mouth 2 (Two) Times a Day With Meals.  Dispense: 60 tablet; Refill: 1         Gave prescription for temazepam to try for insomnia.  He has DC'd the Klonopin long time ago.  Continue Carafate but he needs to take it twice daily.    Follow Up:   No follow-ups on file.    Mike Bhardwaj  [facility] [place]

## 2023-01-03 RX ORDER — FINASTERIDE 5 MG/1
TABLET, FILM COATED ORAL
Qty: 90 TABLET | Refills: 0 | Status: SHIPPED | OUTPATIENT
Start: 2023-01-03 | End: 2023-04-04

## 2023-01-23 RX ORDER — PIOGLITAZONEHYDROCHLORIDE 30 MG/1
TABLET ORAL
Qty: 90 TABLET | Refills: 0 | OUTPATIENT
Start: 2023-01-23

## 2023-01-27 ENCOUNTER — OFFICE VISIT (OUTPATIENT)
Dept: FAMILY MEDICINE CLINIC | Facility: CLINIC | Age: 88
End: 2023-01-27
Payer: MEDICARE

## 2023-01-27 VITALS
SYSTOLIC BLOOD PRESSURE: 124 MMHG | BODY MASS INDEX: 35.92 KG/M2 | DIASTOLIC BLOOD PRESSURE: 80 MMHG | WEIGHT: 271 LBS | OXYGEN SATURATION: 98 % | HEIGHT: 73 IN | HEART RATE: 70 BPM

## 2023-01-27 DIAGNOSIS — R10.9 STOMACH PAIN: ICD-10-CM

## 2023-01-27 DIAGNOSIS — F51.01 PRIMARY INSOMNIA: ICD-10-CM

## 2023-01-27 DIAGNOSIS — E11.65 TYPE 2 DIABETES MELLITUS WITH HYPERGLYCEMIA, WITHOUT LONG-TERM CURRENT USE OF INSULIN: Primary | ICD-10-CM

## 2023-01-27 PROCEDURE — 99214 OFFICE O/P EST MOD 30 MIN: CPT | Performed by: FAMILY MEDICINE

## 2023-01-27 RX ORDER — POTASSIUM CHLORIDE 20 MEQ/1
20 TABLET, EXTENDED RELEASE ORAL DAILY
Qty: 90 TABLET | Refills: 1 | Status: SHIPPED | OUTPATIENT
Start: 2023-01-27

## 2023-01-27 RX ORDER — PIOGLITAZONEHYDROCHLORIDE 30 MG/1
30 TABLET ORAL DAILY
Qty: 90 TABLET | Refills: 1 | Status: SHIPPED | OUTPATIENT
Start: 2023-01-27

## 2023-01-27 NOTE — PROGRESS NOTES
Follow Up Office Visit      Date of Visit:  2023   Patient Name: Arie Fournier  : 1935   MRN: [id number]     Chief Complaint:    Chief Complaint   Patient presents with   • GI Problem       History of Present Illness: Arie Fournier is a 87 y.o. male who is here today for follow up.  Following up on multiple issues.  First, following up on diabetes.  He has had some success with stopping the metformin with his stomachache.  Actos was not causing any major issues so we will restart.  Insomnia not controlled.        Subjective      Review of Systems:   Review of Systems   Constitutional: Negative for fatigue and fever.   HENT: Negative for congestion and ear pain.    Respiratory: Negative for apnea, cough, chest tightness and shortness of breath.    Cardiovascular: Negative for chest pain.   Gastrointestinal: Negative for abdominal pain, constipation, diarrhea and nausea.   Musculoskeletal: Negative for arthralgias.   Psychiatric/Behavioral: Negative for depressed mood and stress.       Past Medical History:   Past Medical History:   Diagnosis Date   • Anxiety    • Benign essential hypertension    • Benign prostatic hyperplasia     WITH URINARY FREQUENCY   • Benign prostatic hypertrophy    • Coronary artery disease    • Depressive disorder    • DJD (degenerative joint disease)     INVOLVING MULTIPLE JOINTS   • Dyslipidemia     In 2016:  Total cholesterol 137, triglycerides 180, HDL 38, LDL 63.   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    • Osteoarthritis    • Primary osteoarthritis involving multiple joints    • Recurrent major depressive episodes, moderate (HCC)    • Type 2 diabetes mellitus (HCC)     WITHOUT HYPERGLYCEMIA WITHOUT LONG TERM CURRENT USE OF INSULIN   • Type 2 diabetes, onset .  In 2016, A1c was 6.5.         Past Surgical History:   Past Surgical History:   Procedure Laterality Date   • CYST REMOVAL  1976    BACK   • HAND SURGERY     • TONSILLECTOMY     • TUMOR EXCISION      skin excision for tumor       Family History:   Family History   Problem Relation Age of Onset   • Stroke Mother    • Hypertension Mother    • Stroke Father    • Hypertension Father    • Diabetes Brother    • Hypertension Brother    • Prostate cancer Brother    • Hyperlipidemia Brother        Social History:   Social History     Socioeconomic History   • Marital status:    Tobacco Use   • Smoking status: Never   • Smokeless tobacco: Never   Substance and Sexual Activity   • Alcohol use: No   • Drug use: No   • Sexual activity: Defer       Medications:     Current Outpatient Medications:   •  potassium chloride (K-DUR,KLOR-CON) 20 MEQ CR tablet, Take 1 tablet by mouth Daily., Disp: 90 tablet, Rfl: 1  •  ARIPIPRAZOLE PO, Take 1 tablet by mouth Daily., Disp: , Rfl:   •  atorvastatin (LIPITOR) 80 MG tablet, Take 80 mg by mouth Daily. PT SAYS HE TAKES VARIATIONS OF THIS DOSAGE, Disp: , Rfl:   •  cetirizine (ZyrTEC) 10 MG tablet, Take 10 mg by mouth Daily., Disp: , Rfl:   •  coenzyme Q10 100 MG capsule, Take 200 mg by mouth Daily., Disp: , Rfl:   •  finasteride (PROSCAR) 5 MG tablet, Take 1 tablet by mouth once daily, Disp: 90 tablet, Rfl: 0  •  Fluticasone Furoate-Vilanterol (BREO ELLIPTA IN), Inhale As Needed., Disp: , Rfl:   •  furosemide (LASIX) 40 MG tablet, Take 1 tablet by mouth Daily., Disp: 90 tablet, Rfl: 1  •  HYDROcodone-acetaminophen (NORCO) 5-325 MG per tablet, Take 1 tablet by mouth Every 6 (Six) Hours As Needed., Disp: , Rfl:   •  ipratropium (ATROVENT) 0.06 % nasal spray, USE 2 SPRAY(S) IN EACH NOSTRIL THREE TIMES DAILY, Disp: , Rfl:   •  methylcellulose, Laxative, (CITRUCEL) 500 MG tablet tablet, Take 2 tablets by mouth As Needed., Disp: , Rfl:   •  metoprolol tartrate (LOPRESSOR) 25 MG tablet, Take 1/2 (one-half) tablet by  "mouth twice daily, Disp: 90 tablet, Rfl: 0  •  montelukast (SINGULAIR) 10 MG tablet, Take 10 mg by mouth Every Evening., Disp: , Rfl:   •  Multiple Vitamin (MULTI VITAMIN PO), Take  by mouth Daily., Disp: , Rfl:   •  mupirocin (Bactroban) 2 % cream, Apply 1 application topically to the appropriate area as directed 3 (Three) Times a Day., Disp: 15 g, Rfl: 0  •  Omega-3 Fatty Acids (FISH OIL) 1000 MG capsule capsule, Take 1,200 mg by mouth Daily With Breakfast., Disp: , Rfl:   •  pioglitazone (Actos) 30 MG tablet, Take 1 tablet by mouth Daily., Disp: 90 tablet, Rfl: 1  •  Saw Palmetto, Serenoa repens, 450 MG capsule, Take  by mouth 2 (Two) Times a Day., Disp: , Rfl:   •  sucralfate (CARAFATE) 1 g tablet, Take 1 tablet by mouth 2 (Two) Times a Day With Meals., Disp: 60 tablet, Rfl: 1  •  tamsulosin (FLOMAX) 0.4 MG capsule 24 hr capsule, Take 1 capsule by mouth Daily., Disp: 90 capsule, Rfl: 1  •  temazepam (Restoril) 7.5 MG capsule, Take 1 capsule by mouth At Night As Needed for Sleep., Disp: 30 capsule, Rfl: 2  •  venlafaxine XR (EFFEXOR-XR) 75 MG 24 hr capsule, Take 1 capsule by mouth Daily. with food, Disp: 90 capsule, Rfl: 1    Allergies:   Allergies   Allergen Reactions   • Other      Horse Serum   • Tetanus Immune Globulin Unknown - Low Severity       Objective     Physical Exam:  Vital Signs:   Vitals:    01/27/23 1011   BP: 124/80   BP Location: Left arm   Patient Position: Sitting   Cuff Size: Large Adult   Pulse: 70   SpO2: 98%   Weight: 123 kg (271 lb)   Height: 185.4 cm (73\")     Body mass index is 35.75 kg/m².     Physical Exam  Vitals and nursing note reviewed.   Constitutional:       General: He is not in acute distress.     Appearance: Normal appearance. He is not ill-appearing.   HENT:      Head: Normocephalic and atraumatic.      Right Ear: Tympanic membrane and ear canal normal.      Left Ear: Tympanic membrane and ear canal normal.      Nose: Nose normal.   Cardiovascular:      Rate and Rhythm: " Normal rate and regular rhythm.      Heart sounds: Normal heart sounds.   Pulmonary:      Effort: Pulmonary effort is normal.      Breath sounds: Normal breath sounds.   Neurological:      Mental Status: He is alert and oriented to person, place, and time. Mental status is at baseline.   Psychiatric:         Mood and Affect: Mood normal.         Procedures      Assessment / Plan      Assessment/Plan:   Diagnoses and all orders for this visit:    1. Type 2 diabetes mellitus with hyperglycemia, without long-term current use of insulin ([facility]) (Primary)    Other orders  -     pioglitazone (Actos) 30 MG tablet; Take 1 tablet by mouth Daily.  Dispense: 90 tablet; Refill: 1  -     potassium chloride (K-DUR,KLOR-CON) 20 MEQ CR tablet; Take 1 tablet by mouth Daily.  Dispense: 90 tablet; Refill: 1         Refill pioglitazone.  Refill potassium.  Stomach pain has improved since not taking as much metformin.  Patient has tried now multiple medications for insomnia with no major relief.  I do not really have any major suggestion otherwise for sleep.    Follow Up:   No follow-ups on file.    Mike Bhardwaj  [facility] [place]

## 2023-01-30 RX ORDER — SUCRALFATE 1 G/1
TABLET ORAL
Qty: 60 TABLET | Refills: 0 | Status: SHIPPED | OUTPATIENT
Start: 2023-01-30 | End: 2023-03-06 | Stop reason: SDUPTHER

## 2023-03-06 ENCOUNTER — OFFICE VISIT (OUTPATIENT)
Dept: FAMILY MEDICINE CLINIC | Facility: CLINIC | Age: 88
End: 2023-03-06
Payer: MEDICARE

## 2023-03-06 VITALS
SYSTOLIC BLOOD PRESSURE: 122 MMHG | HEIGHT: 73 IN | HEART RATE: 75 BPM | WEIGHT: 272.19 LBS | BODY MASS INDEX: 36.07 KG/M2 | DIASTOLIC BLOOD PRESSURE: 74 MMHG | OXYGEN SATURATION: 97 % | RESPIRATION RATE: 19 BRPM

## 2023-03-06 DIAGNOSIS — R10.9 STOMACH PAIN: Primary | ICD-10-CM

## 2023-03-06 DIAGNOSIS — E11.65 TYPE 2 DIABETES MELLITUS WITH HYPERGLYCEMIA, WITHOUT LONG-TERM CURRENT USE OF INSULIN: ICD-10-CM

## 2023-03-06 DIAGNOSIS — J30.89 ALLERGIC RHINITIS DUE TO OTHER ALLERGIC TRIGGER, UNSPECIFIED SEASONALITY: ICD-10-CM

## 2023-03-06 PROCEDURE — 99213 OFFICE O/P EST LOW 20 MIN: CPT | Performed by: PHYSICIAN ASSISTANT

## 2023-03-06 RX ORDER — SUCRALFATE 1 G/1
1 TABLET ORAL 2 TIMES DAILY WITH MEALS
Qty: 60 TABLET | Refills: 2 | Status: SHIPPED | OUTPATIENT
Start: 2023-03-06

## 2023-03-06 NOTE — PROGRESS NOTES
"Chief Complaint  Abdominal Pain (Patient has been seeing Dr. Bhardwaj for his stomach pain. He has been overall not feeling well. ) and Hyperglycemia (His Glucose was 195 this morning. )    Subjective        Arie Fournier presents to [facility]  History of Present Illness  Patient states that he has taken less of his metformin and his stomach pain has improved.  He states that he is just about out of his sucralfate and skipped the medication yesterday.  He states that he noticed he had more stomach pain yesterday especially when his stomach was empty.  He states that his glucose has been running about 150 in the morning.  He is not taking his metformin only at night.      He states that he has had more nasal congestion and increased postnasal drainage. He states that he has been clearing his throat almost constantly lately.  He has been taking his Atrovent nasal spray once or twice a day.    Objective   Vital Signs:  /74 (BP Location: Left arm, Patient Position: Sitting, Cuff Size: Adult)   Pulse 75   Resp 19   Ht 185.4 cm (73\")   Wt 123 kg (272 lb 3 oz)   SpO2 97%   BMI 35.91 kg/m²   Estimated body mass index is 35.91 kg/m² as calculated from the following:    Height as of this encounter: 185.4 cm (73\").    Weight as of this encounter: 123 kg (272 lb 3 oz).             Physical Exam  Vitals and nursing note reviewed.   Constitutional:       Appearance: Normal appearance.   HENT:      Head: Normocephalic and atraumatic.      Right Ear: Tympanic membrane, ear canal and external ear normal.      Left Ear: Tympanic membrane, ear canal and external ear normal.      Nose: Nose normal.      Mouth/Throat:      Mouth: Mucous membranes are moist.      Comments: Clear post nasal drainage    Eyes:      Pupils: Pupils are equal, round, and reactive to light.   Cardiovascular:      Rate and Rhythm: Normal rate and regular rhythm.      Heart sounds: Normal heart sounds.   Pulmonary:    "   Breath sounds: Normal breath sounds.   Neurological:      General: No focal deficit present.      Mental Status: He is alert.        Result Review :                   Assessment and Plan   Diagnoses and all orders for this visit:    1. Stomach pain (Primary)  -     sucralfate (CARAFATE) 1 g tablet; Take 1 tablet by mouth 2 (Two) Times a Day With Meals.  Dispense: 60 tablet; Refill: 2  Restart sucralfate daily.   2. Allergic rhinitis due to other allergic trigger, unspecified seasonality  Increase atrovent nasal spray to 3 times a day to decrease post nasal drainage.   3. Type 2 diabetes mellitus with hyperglycemia, without long-term current use of insulin ([facility])  encoraged patient to continue medications daily and to write down his glucose readings. He is to follow up with dr Bhardwaj       Follow Up   Return in about 2 weeks (around 3/20/2023) for Recheck blood sugar with [place].  Patient was given instructions and counseling regarding his condition or for health maintenance advice. Please see specific information pulled into the AVS if appropriate.

## 2023-03-16 RX ORDER — IPRATROPIUM BROMIDE 42 UG/1
2 SPRAY, METERED NASAL 4 TIMES DAILY
Qty: 15 ML | Refills: 11 | Status: SHIPPED | OUTPATIENT
Start: 2023-03-16

## 2023-03-30 ENCOUNTER — OFFICE VISIT (OUTPATIENT)
Dept: FAMILY MEDICINE CLINIC | Facility: CLINIC | Age: 88
End: 2023-03-30
Payer: MEDICARE

## 2023-03-30 VITALS
SYSTOLIC BLOOD PRESSURE: 124 MMHG | BODY MASS INDEX: 36.71 KG/M2 | HEART RATE: 70 BPM | WEIGHT: 277 LBS | HEIGHT: 73 IN | DIASTOLIC BLOOD PRESSURE: 84 MMHG | OXYGEN SATURATION: 93 %

## 2023-03-30 DIAGNOSIS — F51.01 PRIMARY INSOMNIA: Primary | ICD-10-CM

## 2023-03-30 DIAGNOSIS — I48.91 ATRIAL FIBRILLATION, UNSPECIFIED TYPE: ICD-10-CM

## 2023-03-30 DIAGNOSIS — G47.33 OBSTRUCTIVE SLEEP APNEA: ICD-10-CM

## 2023-03-30 PROCEDURE — 99214 OFFICE O/P EST MOD 30 MIN: CPT | Performed by: FAMILY MEDICINE

## 2023-03-30 RX ORDER — ZOLPIDEM TARTRATE 5 MG/1
5 TABLET ORAL NIGHTLY PRN
Qty: 30 TABLET | Refills: 2 | Status: SHIPPED | OUTPATIENT
Start: 2023-03-30

## 2023-03-30 RX ORDER — LACTULOSE 10 G/15ML
20 SOLUTION ORAL 2 TIMES DAILY PRN
Qty: 946 ML | Refills: 0 | Status: SHIPPED | OUTPATIENT
Start: 2023-03-30

## 2023-03-31 ENCOUNTER — TELEPHONE (OUTPATIENT)
Dept: FAMILY MEDICINE CLINIC | Facility: CLINIC | Age: 88
End: 2023-03-31
Payer: MEDICARE

## 2023-03-31 NOTE — TELEPHONE ENCOUNTER
Caller: ANNA MARIE SARKAR    Relationship: DAUGHTER    Best call back number: [phone]    What is the medical concern/diagnosis: DUE TO RAPID PULSE    What specialty or service is being requested: CARDIOLOGIST    What is the provider, practice or medical service name: DR LEONORA PEREZ    What is the office location:     [address] [place] [address], [address]  [place] [address]   [phone]    What is the office phone number:    Any additional details: PATIENTS DAUGHTER STATED HER FATHER IS NEEDING A NEW REFERRAL BECAUSE HE HAS NOT BEEN SEEN AT DR CARSON OFFICE SINCE 2020.  STATED THEY STATED IT WOULD TAKE 10 DAYS FOR THE REFERRAL TO CLEAR.    PATIENTS DAUGHTER REQUESTS A CALL BACK TO  HER TO SCHEDULE FOR HER FATHER    PLEASE ADVISE

## 2023-04-02 NOTE — PROGRESS NOTES
Follow Up Office Visit      Date of Visit:  2023   Patient Name: Arie Fournier  : 1935   MRN: [id number]     Chief Complaint:    Chief Complaint   Patient presents with   • Diabetes       History of Present Illness: Arie Fournier is a 88 y.o. male who is here today for follow up.  Patient initially seen for fatigue that he thought was more related to his sugar.  Upon further exam and questioning became evident patient is in atrial fibrillation.  Heart rate has fluctuated some at home.  She also battling constipation.  Patient also with a history of sleep apnea.  He does need a new machine.  He has faithful with using machine.        Subjective      Review of Systems:   Review of Systems   Constitutional: Positive for fatigue. Negative for fever.   HENT: Negative for congestion and ear pain.    Respiratory: Positive for shortness of breath. Negative for apnea, cough and chest tightness.    Cardiovascular: Negative for chest pain.   Gastrointestinal: Negative for abdominal pain, constipation, diarrhea and nausea.   Musculoskeletal: Negative for arthralgias.   Psychiatric/Behavioral: Negative for depressed mood and stress.       Past Medical History:   Past Medical History:   Diagnosis Date   • Anxiety    • Benign essential hypertension    • Benign prostatic hyperplasia     WITH URINARY FREQUENCY   • Benign prostatic hypertrophy    • Coronary artery disease    • Depressive disorder    • DJD (degenerative joint disease)     INVOLVING MULTIPLE JOINTS   • Dyslipidemia     In 2016:  Total cholesterol 137, triglycerides 180, HDL 38, LDL 63.   • First degree atrioventricular block    • Hyperglycemia    • Injury from a fall in     • Mixed hyperlipidemia due to type 2 diabetes mellitus    • Obesity    • Obstructive sleep apnea with C-PAP use.    • Osteoarthritis    • Primary osteoarthritis involving multiple joints    • Recurrent major depressive episodes, moderate    • Type 2 diabetes  mellitus     WITHOUT HYPERGLYCEMIA WITHOUT LONG TERM CURRENT USE OF INSULIN   • Type 2 diabetes, onset 2001.  In 2016, A1c was 6.5.        Past Surgical History:   Past Surgical History:   Procedure Laterality Date   • CYST REMOVAL  1976    BACK   • HAND SURGERY     • TONSILLECTOMY     • TUMOR EXCISION      skin excision for tumor       Family History:   Family History   Problem Relation Age of Onset   • Stroke Mother    • Hypertension Mother    • Stroke Father    • Hypertension Father    • Diabetes Brother    • Hypertension Brother    • Prostate cancer Brother    • Hyperlipidemia Brother        Social History:   Social History     Socioeconomic History   • Marital status:    Tobacco Use   • Smoking status: Never   • Smokeless tobacco: Never   Substance and Sexual Activity   • Alcohol use: No   • Drug use: No   • Sexual activity: Defer       Medications:     Current Outpatient Medications:   •  acetaminophen (Tylenol) 325 MG tablet, Take 2 tablets by mouth As Needed for Mild Pain., Disp: , Rfl:   •  apixaban (ELIQUIS) 2.5 MG tablet tablet, Take 1 tablet by mouth 2 (Two) Times a Day., Disp: 60 tablet, Rfl: 0  •  atorvastatin (LIPITOR) 80 MG tablet, Take 20 mg by mouth Daily. PT SAYS HE TAKES VARIATIONS OF THIS DOSAGE, Disp: , Rfl:   •  cetirizine (ZyrTEC) 10 MG tablet, Take 1 tablet by mouth Daily., Disp: , Rfl:   •  coenzyme Q10 100 MG capsule, Take 2 capsules by mouth Daily., Disp: , Rfl:   •  finasteride (PROSCAR) 5 MG tablet, Take 1 tablet by mouth once daily, Disp: 90 tablet, Rfl: 0  •  furosemide (LASIX) 40 MG tablet, Take 1 tablet by mouth Daily. (Patient taking differently: Take 20 mg by mouth Daily.), Disp: 90 tablet, Rfl: 1  •  HYDROcodone-acetaminophen (NORCO) 5-325 MG per tablet, Take 1 tablet by mouth Every 6 (Six) Hours As Needed., Disp: , Rfl:   •  ipratropium (ATROVENT) 0.06 % nasal spray, 2 sprays into the nostril(s) as directed by provider 4 (Four) Times a Day., Disp: 15 mL, Rfl: 11  •   "lactulose (CHRONULAC) 10 GM/15ML solution, Take 30 mL by mouth 2 (Two) Times a Day As Needed (constipation)., Disp: 946 mL, Rfl: 0  •  metFORMIN (GLUCOPHAGE) 1000 MG tablet, Take 1 tablet by mouth 2 (Two) Times a Day With Meals., Disp: , Rfl:   •  methylcellulose, Laxative, (CITRUCEL) 500 MG tablet tablet, Take 2 tablets by mouth As Needed., Disp: , Rfl:   •  metoprolol tartrate (LOPRESSOR) 25 MG tablet, Take 1/2 (one-half) tablet by mouth twice daily, Disp: 90 tablet, Rfl: 0  •  montelukast (SINGULAIR) 10 MG tablet, Take 1 tablet by mouth Every Evening., Disp: , Rfl:   •  Multiple Vitamin (MULTI VITAMIN PO), Take  by mouth Daily., Disp: , Rfl:   •  mupirocin (Bactroban) 2 % cream, Apply 1 application topically to the appropriate area as directed 3 (Three) Times a Day., Disp: 15 g, Rfl: 0  •  pioglitazone (Actos) 30 MG tablet, Take 1 tablet by mouth Daily., Disp: 90 tablet, Rfl: 1  •  potassium chloride (K-DUR,KLOR-CON) 20 MEQ CR tablet, Take 1 tablet by mouth Daily., Disp: 90 tablet, Rfl: 1  •  Saw Palmetto, Serenoa repens, 450 MG capsule, Take  by mouth 2 (Two) Times a Day., Disp: , Rfl:   •  sucralfate (CARAFATE) 1 g tablet, Take 1 tablet by mouth 2 (Two) Times a Day With Meals., Disp: 60 tablet, Rfl: 2  •  tamsulosin (FLOMAX) 0.4 MG capsule 24 hr capsule, Take 1 capsule by mouth Daily., Disp: 90 capsule, Rfl: 1  •  venlafaxine XR (EFFEXOR-XR) 75 MG 24 hr capsule, Take 1 capsule by mouth Daily. with food, Disp: 90 capsule, Rfl: 1  •  zolpidem (AMBIEN) 5 MG tablet, Take 1 tablet by mouth At Night As Needed for Sleep., Disp: 30 tablet, Rfl: 2    Allergies:   Allergies   Allergen Reactions   • Other      Horse Serum   • Tetanus Immune Globulin Unknown - Low Severity       Objective     Physical Exam:  Vital Signs:   Vitals:    03/30/23 1432   BP: 124/84   Pulse: 70   SpO2: 93%   Weight: 126 kg (277 lb)   Height: 185.4 cm (73\")     Body mass index is 36.55 kg/m².     Physical Exam    Procedures      Assessment / Plan "      Assessment/Plan:   Diagnoses and all orders for this visit:    1. Primary insomnia (Primary)  -     zolpidem (AMBIEN) 5 MG tablet; Take 1 tablet by mouth At Night As Needed for Sleep.  Dispense: 30 tablet; Refill: 2    2. Atrial fibrillation, unspecified type  -     Ambulatory Referral to Cardiology    3. Obstructive sleep apnea with C-PAP use.    Other orders  -     lactulose (CHRONULAC) 10 GM/15ML solution; Take 30 mL by mouth 2 (Two) Times a Day As Needed (constipation).  Dispense: 946 mL; Refill: 0  -     apixaban (ELIQUIS) 2.5 MG tablet tablet; Take 1 tablet by mouth 2 (Two) Times a Day.  Dispense: 60 tablet; Refill: 0  -     SCANNED EKG         Suspect his symptoms are more due to his atrial fibrillation with his shortness of breath and fatigue.  Gave Eliquis.  Referral made to cardiology.  Gave lactulose for constipation.  Give orders for new CPAP machine.    Follow Up:   No follow-ups on file.    Mike Bhardwaj  [facility] [place]

## 2023-04-04 RX ORDER — FINASTERIDE 5 MG/1
TABLET, FILM COATED ORAL
Qty: 90 TABLET | Refills: 0 | Status: SHIPPED | OUTPATIENT
Start: 2023-04-04

## 2023-04-07 ENCOUNTER — TELEPHONE (OUTPATIENT)
Dept: FAMILY MEDICINE CLINIC | Facility: CLINIC | Age: 88
End: 2023-04-07
Payer: MEDICARE

## 2023-04-07 NOTE — TELEPHONE ENCOUNTER
Caller: Melanie Fournier    Relationship: Emergency Contact    Best call back number: [phone]    What orders are you requesting (i.e. lab or imaging): NEW CPAP    In what timeframe would the patient need to come in: NA    Where will you receive your lab/imaging services: NA    Additional notes: PATIENTS WIFE REQUESTING NEW CPAP MACHINE ON BEHALF OF PATIENT

## 2023-04-10 NOTE — PROGRESS NOTES
[facility] Cardiology  Office Progress Note  Arie Fournier  1935  [address] [facility] [place] [address]       Visit Date: 04/14/23    PCP: Mike Bhardwaj MD  [address] [place] [address]    IDENTIFICATION: A 88 y.o. male retired from [employer], originally from [place], [place], now lives in [place], [place].     PROBLEM LIST:   1. Atrial Fibrillation, new onset 4/2/23 ?  1. [id number] = 4 (3/30/2023), initiated on apixaban 2.5 mg bid by PCP  2. 11/2022 TSH 3.3   2. Coronary artery disease.   a. 2002 [facility] minimal diffuse disease in RCA and 30% to the proximal LAD. LVEF 50%. Mild elevation of LVEDP ([facility] [place] per Chad Montes De Oca)  b. 4/16 echo EF 60% rvsp 40  3. Dyslipidemia, on statin  a. 3/2016: 137/180/38/63  b. 11/22: 123/74/49/59  4. Type 2 diabetes,   a. onset 2001.   b. 2016 A1c 6.5.   c. 6/18 A1c 6.9  d. 11/22 A1c 6.7  5. Obstructive sleep apnea with C-PAP use   6. Benign prostatic hypertrophy.   7. Surgical history.   a. Remote tonsillectomy.   b. Remote left hand surgery.   c. Remote skin excision for tumor.         CC:   Chief Complaint   Patient presents with   • Irregular Heart Beat       Allergies  Allergies   Allergen Reactions   • Other      Horse Serum   • Tetanus Immune Globulin Unknown - Low Severity       Current Medications    Current Outpatient Medications:   •  acetaminophen (Tylenol) 325 MG tablet, Take 2 tablets by mouth As Needed for Mild Pain., Disp: , Rfl:   •  apixaban (ELIQUIS) 2.5 MG tablet tablet, Take 1 tablet by mouth 2 (Two) Times a Day., Disp: 60 tablet, Rfl: 0  •  atorvastatin (LIPITOR) 80 MG tablet, Take 20 mg by mouth Daily. PT SAYS HE TAKES VARIATIONS OF THIS DOSAGE, Disp: , Rfl:   •  cetirizine (ZyrTEC) 10 MG tablet, Take 1 tablet by mouth Daily., Disp: , Rfl:   •  coenzyme Q10 100 MG capsule, Take 2 capsules by mouth Daily., Disp: , Rfl:   •  finasteride (PROSCAR) 5 MG tablet, Take 1 tablet by mouth once daily, Disp: 90  tablet, Rfl: 0  •  furosemide (LASIX) 40 MG tablet, Take 1 tablet by mouth Daily. (Patient taking differently: Take 20 mg by mouth Daily.), Disp: 90 tablet, Rfl: 1  •  HYDROcodone-acetaminophen (NORCO) 5-325 MG per tablet, Take 1 tablet by mouth Every 6 (Six) Hours As Needed., Disp: , Rfl:   •  ipratropium (ATROVENT) 0.06 % nasal spray, 2 sprays into the nostril(s) as directed by provider 4 (Four) Times a Day., Disp: 15 mL, Rfl: 11  •  lactulose (CHRONULAC) 10 GM/15ML solution, Take 30 mL by mouth 2 (Two) Times a Day As Needed (constipation)., Disp: 946 mL, Rfl: 0  •  metFORMIN (GLUCOPHAGE) 1000 MG tablet, Take 1 tablet by mouth 2 (Two) Times a Day With Meals., Disp: , Rfl:   •  methylcellulose, Laxative, (CITRUCEL) 500 MG tablet tablet, Take 2 tablets by mouth As Needed., Disp: , Rfl:   •  metoprolol tartrate (LOPRESSOR) 25 MG tablet, Take 1/2 (one-half) tablet by mouth twice daily, Disp: 90 tablet, Rfl: 0  •  montelukast (SINGULAIR) 10 MG tablet, Take 1 tablet by mouth Every Evening., Disp: , Rfl:   •  Multiple Vitamin (MULTI VITAMIN PO), Take  by mouth Daily., Disp: , Rfl:   •  mupirocin (Bactroban) 2 % cream, Apply 1 application topically to the appropriate area as directed 3 (Three) Times a Day., Disp: 15 g, Rfl: 0  •  pioglitazone (Actos) 30 MG tablet, Take 1 tablet by mouth Daily., Disp: 90 tablet, Rfl: 1  •  potassium chloride (K-DUR,KLOR-CON) 20 MEQ CR tablet, Take 1 tablet by mouth Daily., Disp: 90 tablet, Rfl: 1  •  Saw Palmetto, Serenoa repens, 450 MG capsule, Take  by mouth 2 (Two) Times a Day., Disp: , Rfl:   •  sucralfate (CARAFATE) 1 g tablet, Take 1 tablet by mouth 2 (Two) Times a Day With Meals., Disp: 60 tablet, Rfl: 2  •  tamsulosin (FLOMAX) 0.4 MG capsule 24 hr capsule, Take 1 capsule by mouth Daily., Disp: 90 capsule, Rfl: 1  •  venlafaxine XR (EFFEXOR-XR) 75 MG 24 hr capsule, Take 1 capsule by mouth Daily. with food, Disp: 90 capsule, Rfl: 1  •  zolpidem (AMBIEN) 5 MG tablet, Take 1 tablet by  "mouth At Night As Needed for Sleep., Disp: 30 tablet, Rfl: 2      History of Present Illness   Arie Fournier is a 88 y.o. year old male here for new onset atrial fibrillation reported by his PCP on 4/2/23. Our office follows the patient for mild, nonobstructive CAD and cardiac risk factor management historically.  Patient was noted in atrial fibrillation when he most recently saw his family doctor.  He and his wife state that he has significant fatigue and exercise intolerance as well          OBJECTIVE:  Vitals:    04/14/23 1109   BP: 124/62   BP Location: Left arm   Patient Position: Sitting   Pulse: 80   SpO2: 97%   Weight: 121 kg (267 lb)   Height: 185.4 cm (73\")     Body mass index is 35.23 kg/m².    Constitutional:       Appearance: Healthy appearance. Not in distress.   Neck:      Vascular: No JVR. JVD normal.   Pulmonary:      Effort: Pulmonary effort is normal.      Breath sounds: Normal breath sounds. No wheezing. No rhonchi. No rales.   Chest:      Chest wall: Not tender to palpatation.   Cardiovascular:      PMI at left midclavicular line. Normal rate. Irregularly irregular rhythm. Normal S1. Normal S2.      Murmurs: There is no murmur.      No gallop. No click. No rub.   Pulses:     Intact distal pulses.   Edema:     Peripheral edema present.  Abdominal:      General: Bowel sounds are normal.      Palpations: Abdomen is soft.      Tenderness: There is no abdominal tenderness.   Musculoskeletal: Normal range of motion.         General: No tenderness. Skin:     General: Skin is warm and dry.   Neurological:      General: No focal deficit present.      Mental Status: Alert and oriented to person, place and time.         Diagnostic Data:  Procedures    Advance Care Planning          ASSESSMENT:   Diagnosis Plan   1. Coronary artery disease involving native coronary artery of native heart without angina pectoris        2. Dyslipidemia        3. Benign essential HTN        4. Paroxysmal atrial fibrillation  " Holter Monitor - 48 Hour          PLAN:  CAD remote minimal coronary disease no overt anginal equivalent current continue GDMT    Dyslipidemia controlled on statin there    Paroxysmal A-fib committed to rate control anticoagulation we will follow-up 48-hour Holter monitor at    Dyspnea on exertion fatigue we will document echocardiogram at his nearest convenience          Arie Fatima MD, FACC

## 2023-04-14 ENCOUNTER — OFFICE VISIT (OUTPATIENT)
Dept: CARDIOLOGY | Facility: CLINIC | Age: 88
End: 2023-04-14
Payer: MEDICARE

## 2023-04-14 VITALS
OXYGEN SATURATION: 97 % | DIASTOLIC BLOOD PRESSURE: 62 MMHG | BODY MASS INDEX: 35.39 KG/M2 | WEIGHT: 267 LBS | SYSTOLIC BLOOD PRESSURE: 124 MMHG | HEIGHT: 73 IN | HEART RATE: 80 BPM

## 2023-04-14 DIAGNOSIS — I48.0 PAROXYSMAL ATRIAL FIBRILLATION: ICD-10-CM

## 2023-04-14 DIAGNOSIS — I10 BENIGN ESSENTIAL HTN: ICD-10-CM

## 2023-04-14 DIAGNOSIS — E78.5 DYSLIPIDEMIA: ICD-10-CM

## 2023-04-14 DIAGNOSIS — I25.10 CORONARY ARTERY DISEASE INVOLVING NATIVE CORONARY ARTERY OF NATIVE HEART WITHOUT ANGINA PECTORIS: Primary | ICD-10-CM

## 2023-04-14 PROCEDURE — 99214 OFFICE O/P EST MOD 30 MIN: CPT | Performed by: INTERNAL MEDICINE

## 2023-04-14 RX ORDER — ACETAMINOPHEN 325 MG/1
650 TABLET ORAL AS NEEDED
COMMUNITY

## 2023-04-20 ENCOUNTER — TELEPHONE (OUTPATIENT)
Dept: FAMILY MEDICINE CLINIC | Facility: CLINIC | Age: 88
End: 2023-04-20
Payer: MEDICARE

## 2023-04-20 NOTE — TELEPHONE ENCOUNTER
FERMIN FROM [facility] IS ASKING FOR OFFICE NOTES FROM A VISIT ON 4/11 WITH DR. LIAO. HE STATED THAT IN ORDER TO ORDER A NEW CPAP HE NEEDS THE OFFICE NOTES.

## 2023-04-25 ENCOUNTER — TELEPHONE (OUTPATIENT)
Dept: CARDIOLOGY | Facility: CLINIC | Age: 88
End: 2023-04-25
Payer: MEDICARE

## 2023-04-25 NOTE — TELEPHONE ENCOUNTER
Spoke with wife per pt request (he is [facility]). Aware of monitor results revealing 100% AF burden, HR . Pt to continue medications including Eliquis and metoprolol.    Echo was not ordered at time of FU. Order placed and message to scheduling to arrange ASAP.

## 2023-04-26 ENCOUNTER — HOSPITAL ENCOUNTER (OUTPATIENT)
Dept: CARDIOLOGY | Facility: HOSPITAL | Age: 88
Discharge: HOME | End: 2023-04-26
Admitting: INTERNAL MEDICINE
Payer: MEDICARE

## 2023-04-26 VITALS — HEIGHT: 75 IN | BODY MASS INDEX: 33.2 KG/M2 | WEIGHT: 267 LBS

## 2023-04-26 DIAGNOSIS — R06.02 SHORTNESS OF BREATH: ICD-10-CM

## 2023-04-26 DIAGNOSIS — I48.0 PAROXYSMAL ATRIAL FIBRILLATION: ICD-10-CM

## 2023-04-26 PROCEDURE — 93306 TTE W/DOPPLER COMPLETE: CPT | Performed by: INTERNAL MEDICINE

## 2023-04-26 PROCEDURE — 93306 TTE W/DOPPLER COMPLETE: CPT

## 2023-04-27 ENCOUNTER — TELEPHONE (OUTPATIENT)
Dept: CARDIOLOGY | Facility: CLINIC | Age: 88
End: 2023-04-27
Payer: MEDICARE

## 2023-04-27 DIAGNOSIS — I48.0 PAROXYSMAL ATRIAL FIBRILLATION: Primary | ICD-10-CM

## 2023-04-27 DIAGNOSIS — R06.02 SHORTNESS OF BREATH: ICD-10-CM

## 2023-04-27 DIAGNOSIS — I10 BENIGN ESSENTIAL HTN: ICD-10-CM

## 2023-04-27 LAB
ASCENDING AORTA: 3.6 CM
BH CV ECHO MEAS - AO MAX PG: 7.4 MMHG
BH CV ECHO MEAS - AO MEAN PG: 4 MMHG
BH CV ECHO MEAS - AO ROOT AREA (BSA CORRECTED): 1.7 CM2
BH CV ECHO MEAS - AO ROOT DIAM: 4.1 CM
BH CV ECHO MEAS - AO V2 MAX: 136 CM/SEC
BH CV ECHO MEAS - AO V2 VTI: 25.2 CM
BH CV ECHO MEAS - AVA(I,D): 2.6 CM2
BH CV ECHO MEAS - EDV(CUBED): 117.6 ML
BH CV ECHO MEAS - EDV(MOD-SP2): 110 ML
BH CV ECHO MEAS - EDV(MOD-SP4): 101 ML
BH CV ECHO MEAS - EF(MOD-BP): 50.6 %
BH CV ECHO MEAS - EF(MOD-SP2): 49.5 %
BH CV ECHO MEAS - EF(MOD-SP4): 53.2 %
BH CV ECHO MEAS - ESV(CUBED): 8 ML
BH CV ECHO MEAS - ESV(MOD-SP2): 55.6 ML
BH CV ECHO MEAS - ESV(MOD-SP4): 47.3 ML
BH CV ECHO MEAS - FS: 59.2 %
BH CV ECHO MEAS - IVS/LVPW: 1 CM
BH CV ECHO MEAS - IVSD: 0.8 CM
BH CV ECHO MEAS - LA DIMENSION: 4.8 CM
BH CV ECHO MEAS - LAT PEAK E' VEL: 11.7 CM/SEC
BH CV ECHO MEAS - LV DIASTOLIC VOL/BSA (35-75): 41.5 CM2
BH CV ECHO MEAS - LV MASS(C)D: 131.2 GRAMS
BH CV ECHO MEAS - LV MAX PG: 4.8 MMHG
BH CV ECHO MEAS - LV MEAN PG: 1 MMHG
BH CV ECHO MEAS - LV SYSTOLIC VOL/BSA (12-30): 19.4 CM2
BH CV ECHO MEAS - LV V1 MAX: 109 CM/SEC
BH CV ECHO MEAS - LV V1 VTI: 17.2 CM
BH CV ECHO MEAS - LVIDD: 4.9 CM
BH CV ECHO MEAS - LVIDS: 2 CM
BH CV ECHO MEAS - LVOT AREA: 3.8 CM2
BH CV ECHO MEAS - LVOT DIAM: 2.2 CM
BH CV ECHO MEAS - LVPWD: 0.8 CM
BH CV ECHO MEAS - MED PEAK E' VEL: 10.4 CM/SEC
BH CV ECHO MEAS - MV A MAX VEL: 42 CM/SEC
BH CV ECHO MEAS - MV DEC SLOPE: 580 CM/SEC2
BH CV ECHO MEAS - MV DEC TIME: 0.23 MSEC
BH CV ECHO MEAS - MV E MAX VEL: 131 CM/SEC
BH CV ECHO MEAS - MV E/A: 3.1
BH CV ECHO MEAS - MV MAX PG: 7.7 MMHG
BH CV ECHO MEAS - MV MEAN PG: 2 MMHG
BH CV ECHO MEAS - MV P1/2T: 70.7 MSEC
BH CV ECHO MEAS - MV V2 VTI: 33.8 CM
BH CV ECHO MEAS - MVA(P1/2T): 3.1 CM2
BH CV ECHO MEAS - MVA(VTI): 1.93 CM2
BH CV ECHO MEAS - PA ACC TIME: 0.12 SEC
BH CV ECHO MEAS - PA PR(ACCEL): 25.9 MMHG
BH CV ECHO MEAS - PA V2 MAX: 107.5 CM/SEC
BH CV ECHO MEAS - PI END-D VEL: 89.3 CM/SEC
BH CV ECHO MEAS - RAP SYSTOLE: 3 MMHG
BH CV ECHO MEAS - RVSP: 33 MMHG
BH CV ECHO MEAS - SI(MOD-SP2): 22.4 ML/M2
BH CV ECHO MEAS - SI(MOD-SP4): 22.1 ML/M2
BH CV ECHO MEAS - SV(LVOT): 65.4 ML
BH CV ECHO MEAS - SV(MOD-SP2): 54.4 ML
BH CV ECHO MEAS - SV(MOD-SP4): 53.7 ML
BH CV ECHO MEAS - TAPSE (>1.6): 2.02 CM
BH CV ECHO MEAS - TR MAX PG: 29.2 MMHG
BH CV ECHO MEAS - TR MAX VEL: 270 CM/SEC
BH CV ECHO MEASUREMENTS AVERAGE E/E' RATIO: 11.86
BH CV VAS BP LEFT ARM: NORMAL MMHG
BH CV XLRA - RV BASE: 3.3 CM
BH CV XLRA - RV LENGTH: 7.7 CM
BH CV XLRA - RV MID: 1.9 CM
BH CV XLRA - TDI S': 18 CM/SEC
IVRT: 62 MSEC
LEFT ATRIUM VOLUME INDEX: 33.2 ML/M2
MAXIMAL PREDICTED HEART RATE: 133 BPM
STRESS TARGET HR: 113 BPM

## 2023-04-27 NOTE — TELEPHONE ENCOUNTER
Per Dr. Fatima -   Echo and holter ok   afib but rate controlled, on Eliquis    Need cbc, bnp, bmp if not since last 11/22 in epic     Pt and wife aware - orders placed for labs. Pt will have drawn with local PCP office. NO need to fast.

## 2023-04-28 ENCOUNTER — LAB (OUTPATIENT)
Dept: FAMILY MEDICINE CLINIC | Facility: CLINIC | Age: 88
End: 2023-04-28
Payer: MEDICARE

## 2023-04-28 DIAGNOSIS — I10 BENIGN ESSENTIAL HTN: ICD-10-CM

## 2023-04-28 DIAGNOSIS — I48.0 PAROXYSMAL ATRIAL FIBRILLATION: ICD-10-CM

## 2023-04-28 DIAGNOSIS — R06.02 SHORTNESS OF BREATH: ICD-10-CM

## 2023-04-28 PROCEDURE — 36415 COLL VENOUS BLD VENIPUNCTURE: CPT | Performed by: INTERNAL MEDICINE

## 2023-04-29 LAB
BASOPHILS # BLD AUTO: 0 X10E3/UL (ref 0–0.2)
BASOPHILS NFR BLD AUTO: 1 %
BUN SERPL-MCNC: 19 MG/DL (ref 8–27)
BUN/CREAT SERPL: 20 (ref 10–24)
CALCIUM SERPL-MCNC: 9.4 MG/DL (ref 8.6–10.2)
CHLORIDE SERPL-SCNC: 100 MMOL/L (ref 96–106)
CO2 SERPL-SCNC: 23 MMOL/L (ref 20–29)
CREAT SERPL-MCNC: 0.97 MG/DL (ref 0.76–1.27)
EGFRCR SERPLBLD CKD-EPI 2021: 76 ML/MIN/1.73
EOSINOPHIL # BLD AUTO: 0.4 X10E3/UL (ref 0–0.4)
EOSINOPHIL NFR BLD AUTO: 7 %
ERYTHROCYTE [DISTWIDTH] IN BLOOD BY AUTOMATED COUNT: 14.1 % (ref 11.6–15.4)
GLUCOSE SERPL-MCNC: NORMAL MG/DL
HCT VFR BLD AUTO: 42.3 % (ref 37.5–51)
HGB BLD-MCNC: 14.4 G/DL (ref 13–17.7)
IMM GRANULOCYTES # BLD AUTO: 0 X10E3/UL (ref 0–0.1)
IMM GRANULOCYTES NFR BLD AUTO: 0 %
LYMPHOCYTES # BLD AUTO: 1.4 X10E3/UL (ref 0.7–3.1)
LYMPHOCYTES NFR BLD AUTO: 24 %
MCH RBC QN AUTO: 30.6 PG (ref 26.6–33)
MCHC RBC AUTO-ENTMCNC: 34 G/DL (ref 31.5–35.7)
MCV RBC AUTO: 90 FL (ref 79–97)
MONOCYTES # BLD AUTO: 0.5 X10E3/UL (ref 0.1–0.9)
MONOCYTES NFR BLD AUTO: 9 %
NEUTROPHILS # BLD AUTO: 3.5 X10E3/UL (ref 1.4–7)
NEUTROPHILS NFR BLD AUTO: 59 %
NT-PROBNP SERPL-MCNC: 1393 PG/ML (ref 0–486)
PLATELET # BLD AUTO: 171 X10E3/UL (ref 150–450)
POTASSIUM SERPL-SCNC: NORMAL MMOL/L
RBC # BLD AUTO: 4.71 X10E6/UL (ref 4.14–5.8)
SODIUM SERPL-SCNC: 141 MMOL/L (ref 134–144)
WBC # BLD AUTO: 5.7 X10E3/UL (ref 3.4–10.8)

## 2023-05-02 ENCOUNTER — TELEPHONE (OUTPATIENT)
Dept: FAMILY MEDICINE CLINIC | Facility: CLINIC | Age: 88
End: 2023-05-02
Payer: MEDICARE

## 2023-05-02 NOTE — TELEPHONE ENCOUNTER
"  Caller: SHIRLEY    Relationship: Provider    Best call back number: [phone]    What is the best time to reach you: ANY TIME     Who are you requesting to speak with (clinical staff, provider,  specific staff member): CLINICAL      What was the call regarding: SHIRLEY WITH [facility] CALLED STATING INSURANCE NEEDS MORE INFORMATION IN ORDER FOR PATIENT TO GET A NEW CPAP MACHINE. THEY NEEDS OFFICE NOTES BETWEEN 03/30/23 - 04/11/23    NOTES MUST STATE \"PATIENT IS USING AND BENEFITING FROM CPAP MACHINE\"     [phone]    Do you require a callback: YES       "

## 2023-05-08 ENCOUNTER — TELEPHONE (OUTPATIENT)
Dept: CARDIOLOGY | Facility: CLINIC | Age: 88
End: 2023-05-08
Payer: MEDICARE

## 2023-05-08 NOTE — TELEPHONE ENCOUNTER
----- Message from Arie Fatima MD sent at 5/5/2023  8:38 AM EDT   Probnp up  See if can change actos to jardiance 10    Pt aware and agreeable. Rx sent. He will call should Jardiance be cost prohibitive. Samples are available in office

## 2023-05-09 RX ORDER — DAPAGLIFLOZIN 5 MG/1
5 TABLET, FILM COATED ORAL DAILY
Qty: 30 TABLET | Refills: 2 | Status: SHIPPED | OUTPATIENT
Start: 2023-05-09

## 2023-05-10 RX ORDER — DAPAGLIFLOZIN 5 MG/1
5 TABLET, FILM COATED ORAL DAILY
Qty: 30 TABLET | Refills: 2 | OUTPATIENT
Start: 2023-05-10

## 2023-05-10 NOTE — TELEPHONE ENCOUNTER
Caller: [facility] [address] [place] [address] [phone] [facility] [phone]    Relationship: Pharmacy    Best call back number: [phone]    Requested Prescriptions:   Requested Prescriptions     Pending Prescriptions Disp Refills   • dapagliflozin (Farxiga) 5 MG tablet tablet 30 tablet 2     Sig: Take 1 tablet by mouth Daily.        Pharmacy where request should be sent: [address] [phone] [facility] [phone] FX     Last office visit with prescribing clinician: 3/30/2023   Last telemedicine visit with prescribing clinician: 5/5/2023   Next office visit with prescribing clinician: 5/17/2023     Additional details provided by patient: PHARMACY STATES JARDIANCE WAS CALLED IN BY ANOTHER PROVIDER. SHOULD HE BE TAKING IT AND THE FARXIGA TOGETHER?    Does the patient have less than a 3 day supply:  [] Yes  [] No    Would you like a call back once the refill request has been completed: [] Yes [] No    If the office needs to give you a call back, can they leave a voicemail: [] Yes [] No    D[place] Carlos Rep   05/10/23 14:11 EDT

## 2023-05-12 ENCOUNTER — TELEPHONE (OUTPATIENT)
Dept: FAMILY MEDICINE CLINIC | Facility: CLINIC | Age: 88
End: 2023-05-12
Payer: MEDICARE

## 2023-05-17 ENCOUNTER — OFFICE VISIT (OUTPATIENT)
Dept: FAMILY MEDICINE CLINIC | Facility: CLINIC | Age: 88
End: 2023-05-17
Payer: MEDICARE

## 2023-05-17 VITALS
BODY MASS INDEX: 34.65 KG/M2 | OXYGEN SATURATION: 94 % | DIASTOLIC BLOOD PRESSURE: 90 MMHG | SYSTOLIC BLOOD PRESSURE: 156 MMHG | HEART RATE: 84 BPM | WEIGHT: 270 LBS | HEIGHT: 74 IN

## 2023-05-17 DIAGNOSIS — I48.0 PAROXYSMAL ATRIAL FIBRILLATION: Primary | ICD-10-CM

## 2023-05-17 DIAGNOSIS — E11.65 TYPE 2 DIABETES MELLITUS WITH HYPERGLYCEMIA, WITHOUT LONG-TERM CURRENT USE OF INSULIN: ICD-10-CM

## 2023-05-17 PROCEDURE — 99214 OFFICE O/P EST MOD 30 MIN: CPT | Performed by: FAMILY MEDICINE

## 2023-05-17 NOTE — PROGRESS NOTES
Follow Up Office Visit      Date of Visit:  2023   Patient Name: Arie Fournier  : 1935   MRN: [id number]     Chief Complaint:    Chief Complaint   Patient presents with   • discuss meds       History of Present Illness: Arie Fournier is a 88 y.o. male who is here today for follow up.  Patient wanted to discuss some is related to his atrial fibrillation and diabetes today.  We had sent him to cardiology for evaluation of the new onset A-fib.  Patient did have also an elevated BNP.  Patient was started on Jardiance for cardioprotective benefits.  Actos was replaced.  The Jardiance will be a much better longer-term option.  He was also started on Eliquis.  Problem is, these medications are both much more expensive than what he was paying and having a hard time affording.  We have been able to give him samples as has cardiology.        Subjective      Review of Systems:   Review of Systems   Constitutional: Negative for fatigue and fever.   HENT: Negative for congestion and ear pain.    Respiratory: Positive for shortness of breath. Negative for apnea, cough and chest tightness.    Cardiovascular: Negative for chest pain.   Gastrointestinal: Negative for abdominal pain, constipation, diarrhea and nausea.   Musculoskeletal: Negative for arthralgias.   Psychiatric/Behavioral: Negative for depressed mood and stress.       Past Medical History:   Past Medical History:   Diagnosis Date   • Anxiety    • Benign essential hypertension    • Benign prostatic hyperplasia     WITH URINARY FREQUENCY   • Benign prostatic hypertrophy    • Coronary artery disease    • Depressive disorder    • DJD (degenerative joint disease)     INVOLVING MULTIPLE JOINTS   • Dyslipidemia     In 2016:  Total cholesterol 137, triglycerides 180, HDL 38, LDL 63.   • First degree atrioventricular block    • Hyperglycemia    • Injury from a fall in     • Mixed hyperlipidemia due to type 2 diabetes mellitus    • Obesity    •  Obstructive sleep apnea with C-PAP use.    • Osteoarthritis    • Primary osteoarthritis involving multiple joints    • Recurrent major depressive episodes, moderate    • Type 2 diabetes mellitus     WITHOUT HYPERGLYCEMIA WITHOUT LONG TERM CURRENT USE OF INSULIN   • Type 2 diabetes, onset 2001.  In 2016, A1c was 6.5.        Past Surgical History:   Past Surgical History:   Procedure Laterality Date   • CYST REMOVAL  1976    BACK   • HAND SURGERY     • TONSILLECTOMY     • TUMOR EXCISION      skin excision for tumor       Family History:   Family History   Problem Relation Age of Onset   • Stroke Mother    • Hypertension Mother    • Stroke Father    • Hypertension Father    • Diabetes Brother    • Hypertension Brother    • Prostate cancer Brother    • Hyperlipidemia Brother        Social History:   Social History     Socioeconomic History   • Marital status:    Tobacco Use   • Smoking status: Never   • Smokeless tobacco: Never   Substance and Sexual Activity   • Alcohol use: No   • Drug use: No   • Sexual activity: Defer       Medications:     Current Outpatient Medications:   •  acetaminophen (Tylenol) 325 MG tablet, Take 2 tablets by mouth As Needed for Mild Pain., Disp: , Rfl:   •  apixaban (ELIQUIS) 2.5 MG tablet tablet, Take 1 tablet by mouth 2 (Two) Times a Day., Disp: 60 tablet, Rfl: 11  •  atorvastatin (LIPITOR) 80 MG tablet, Take 20 mg by mouth Daily. PT SAYS HE TAKES VARIATIONS OF THIS DOSAGE, Disp: , Rfl:   •  cetirizine (ZyrTEC) 10 MG tablet, Take 1 tablet by mouth Daily., Disp: , Rfl:   •  coenzyme Q10 100 MG capsule, Take 2 capsules by mouth Daily., Disp: , Rfl:   •  dapagliflozin (Farxiga) 5 MG tablet tablet, Take 1 tablet by mouth Daily., Disp: 30 tablet, Rfl: 2  •  finasteride (PROSCAR) 5 MG tablet, Take 1 tablet by mouth once daily, Disp: 90 tablet, Rfl: 0  •  furosemide (LASIX) 40 MG tablet, Take 1 tablet by mouth Daily. (Patient taking differently: Take 20 mg by mouth Daily.), Disp: 90  tablet, Rfl: 1  •  HYDROcodone-acetaminophen (NORCO) 5-325 MG per tablet, Take 1 tablet by mouth Every 6 (Six) Hours As Needed., Disp: , Rfl:   •  ipratropium (ATROVENT) 0.06 % nasal spray, 2 sprays into the nostril(s) as directed by provider 4 (Four) Times a Day., Disp: 15 mL, Rfl: 11  •  lactulose (CHRONULAC) 10 GM/15ML solution, Take 30 mL by mouth 2 (Two) Times a Day As Needed (constipation)., Disp: 946 mL, Rfl: 0  •  metFORMIN (GLUCOPHAGE) 1000 MG tablet, TAKE 1 TABLET BY MOUTH TWICE DAILY WITH  MORNING  AND  WITH  EVENING  MEAL, Disp: 180 tablet, Rfl: 0  •  methylcellulose, Laxative, (CITRUCEL) 500 MG tablet tablet, Take 2 tablets by mouth As Needed., Disp: , Rfl:   •  metoprolol tartrate (LOPRESSOR) 25 MG tablet, Take 1/2 (one-half) tablet by mouth twice daily, Disp: 90 tablet, Rfl: 0  •  montelukast (SINGULAIR) 10 MG tablet, Take 1 tablet by mouth Every Evening., Disp: , Rfl:   •  Multiple Vitamin (MULTI VITAMIN PO), Take  by mouth Daily., Disp: , Rfl:   •  mupirocin (Bactroban) 2 % cream, Apply 1 application topically to the appropriate area as directed 3 (Three) Times a Day., Disp: 15 g, Rfl: 0  •  potassium chloride (K-DUR,KLOR-CON) 20 MEQ CR tablet, Take 1 tablet by mouth Daily., Disp: 90 tablet, Rfl: 1  •  Saw Palmetto, Serenoa repens, 450 MG capsule, Take  by mouth 2 (Two) Times a Day., Disp: , Rfl:   •  sucralfate (CARAFATE) 1 g tablet, Take 1 tablet by mouth 2 (Two) Times a Day With Meals., Disp: 60 tablet, Rfl: 2  •  tamsulosin (FLOMAX) 0.4 MG capsule 24 hr capsule, Take 1 capsule by mouth Daily., Disp: 90 capsule, Rfl: 1  •  triamcinolone (KENALOG) 0.1 % ointment, Apply 1 application topically to the appropriate area as directed 2 (Two) Times a Day., Disp: 60 g, Rfl: 1  •  venlafaxine XR (EFFEXOR-XR) 75 MG 24 hr capsule, Take 1 capsule by mouth Daily. with food, Disp: 90 capsule, Rfl: 1  •  zolpidem (AMBIEN) 5 MG tablet, Take 1 tablet by mouth At Night As Needed for Sleep., Disp: 30 tablet, Rfl:  "2    Allergies:   Allergies   Allergen Reactions   • Other      Horse Serum   • Tetanus Immune Globulin Unknown - Low Severity       Objective     Physical Exam:  Vital Signs:   Vitals:    05/17/23 0938   BP: 156/90   Pulse: 84   SpO2: 94%   Weight: 122 kg (270 lb)   Height: 188 cm (74\")     Body mass index is 34.67 kg/m².     Physical Exam  Vitals and nursing note reviewed.   Constitutional:       General: He is not in acute distress.     Appearance: Normal appearance. He is not ill-appearing.   HENT:      Head: Normocephalic and atraumatic.      Right Ear: Tympanic membrane and ear canal normal.      Left Ear: Tympanic membrane and ear canal normal.      Nose: Nose normal.   Cardiovascular:      Rate and Rhythm: Normal rate and regular rhythm.      Heart sounds: Normal heart sounds.   Pulmonary:      Effort: Pulmonary effort is normal.      Breath sounds: Normal breath sounds.   Neurological:      Mental Status: He is alert and oriented to person, place, and time. Mental status is at baseline.   Psychiatric:         Mood and Affect: Mood normal.         Procedures      Assessment / Plan      Assessment/Plan:   Diagnoses and all orders for this visit:    1. Paroxysmal atrial fibrillation (Primary)    2. Type 2 diabetes mellitus with hyperglycemia, without long-term current use of insulin    Other orders  -     triamcinolone (KENALOG) 0.1 % ointment; Apply 1 application topically to the appropriate area as directed 2 (Two) Times a Day.  Dispense: 60 g; Refill: 1         Continue both the Eliquis and Jardiance for now.  Gave more samples of both.  Gave a prescription for Farxiga to see if it was any cheaper than the Jardiance at the pharmacy.  He did have an unremarkable rash on his lower right leg that was itching I gave him some triamcinolone cream today.    Follow Up:   No follow-ups on file.    Mike Bhardwaj  [facility] [place]   "

## 2023-05-23 DIAGNOSIS — R10.9 STOMACH PAIN: ICD-10-CM

## 2023-05-23 RX ORDER — SUCRALFATE 1 G/1
1 TABLET ORAL 2 TIMES DAILY WITH MEALS
Qty: 60 TABLET | Refills: 2 | Status: SHIPPED | OUTPATIENT
Start: 2023-05-23 | End: 2023-05-23

## 2023-05-23 RX ORDER — SUCRALFATE 1 G/1
1 TABLET ORAL 2 TIMES DAILY WITH MEALS
Qty: 60 TABLET | Refills: 2 | Status: SHIPPED | OUTPATIENT
Start: 2023-05-23

## 2023-07-21 PROBLEM — G70.00 MYASTHENIA GRAVIS WITHOUT EXACERBATION: Status: ACTIVE | Noted: 2023-07-21

## 2023-07-21 PROBLEM — G70.00 MYASTHENIA GRAVIS WITHOUT EXACERBATION: Chronic | Status: ACTIVE | Noted: 2023-07-21

## 2023-07-23 ENCOUNTER — APPOINTMENT (OUTPATIENT)
Dept: GENERAL RADIOLOGY | Facility: HOSPITAL | Age: 88
End: 2023-07-23
Payer: MEDICARE

## 2023-07-23 ENCOUNTER — HOSPITAL ENCOUNTER (EMERGENCY)
Facility: HOSPITAL | Age: 88
Discharge: HOME | End: 2023-07-23
Attending: EMERGENCY MEDICINE | Admitting: EMERGENCY MEDICINE
Payer: MEDICARE

## 2023-07-23 VITALS
SYSTOLIC BLOOD PRESSURE: 120 MMHG | HEART RATE: 69 BPM | RESPIRATION RATE: 20 BRPM | DIASTOLIC BLOOD PRESSURE: 93 MMHG | OXYGEN SATURATION: 95 % | TEMPERATURE: 98 F | WEIGHT: 265 LBS | HEIGHT: 74 IN | BODY MASS INDEX: 34.01 KG/M2

## 2023-07-23 DIAGNOSIS — Z86.39 HISTORY OF DIABETES MELLITUS: ICD-10-CM

## 2023-07-23 DIAGNOSIS — Z86.39 HISTORY OF HYPERLIPIDEMIA: ICD-10-CM

## 2023-07-23 DIAGNOSIS — Z86.79 HISTORY OF CORONARY ARTERY DISEASE: ICD-10-CM

## 2023-07-23 DIAGNOSIS — R07.9 ACUTE CHEST PAIN: Primary | ICD-10-CM

## 2023-07-23 LAB
ALBUMIN SERPL-MCNC: 3.9 G/DL (ref 3.5–5.2)
ALBUMIN/GLOB SERPL: 1.6 G/DL
ALP SERPL-CCNC: 58 U/L (ref 39–117)
ALT SERPL W P-5'-P-CCNC: 16 U/L (ref 1–41)
ANION GAP SERPL CALCULATED.3IONS-SCNC: 8 MMOL/L (ref 5–15)
AST SERPL-CCNC: 16 U/L (ref 1–40)
BASOPHILS # BLD AUTO: 0.03 10*3/MM3 (ref 0–0.2)
BASOPHILS NFR BLD AUTO: 0.4 % (ref 0–1.5)
BILIRUB SERPL-MCNC: 0.5 MG/DL (ref 0–1.2)
BUN SERPL-MCNC: 19 MG/DL (ref 8–23)
BUN/CREAT SERPL: 22.1 (ref 7–25)
CALCIUM SPEC-SCNC: 8.9 MG/DL (ref 8.6–10.5)
CHLORIDE SERPL-SCNC: 95 MMOL/L (ref 98–107)
CO2 SERPL-SCNC: 30 MMOL/L (ref 22–29)
CREAT SERPL-MCNC: 0.86 MG/DL (ref 0.76–1.27)
DEPRECATED RDW RBC AUTO: 50.3 FL (ref 37–54)
EGFRCR SERPLBLD CKD-EPI 2021: 83.8 ML/MIN/1.73
EOSINOPHIL # BLD AUTO: 0.33 10*3/MM3 (ref 0–0.4)
EOSINOPHIL NFR BLD AUTO: 4.9 % (ref 0.3–6.2)
ERYTHROCYTE [DISTWIDTH] IN BLOOD BY AUTOMATED COUNT: 14.5 % (ref 12.3–15.4)
GEN 5 2HR TROPONIN T REFLEX: 19 NG/L
GLOBULIN UR ELPH-MCNC: 2.5 GM/DL
GLUCOSE SERPL-MCNC: 140 MG/DL (ref 65–99)
HCT VFR BLD AUTO: 43.8 % (ref 37.5–51)
HGB BLD-MCNC: 14.1 G/DL (ref 13–17.7)
HOLD SPECIMEN: NORMAL
IMM GRANULOCYTES # BLD AUTO: 0.02 10*3/MM3 (ref 0–0.05)
IMM GRANULOCYTES NFR BLD AUTO: 0.3 % (ref 0–0.5)
LIPASE SERPL-CCNC: 95 U/L (ref 13–60)
LYMPHOCYTES # BLD AUTO: 1.48 10*3/MM3 (ref 0.7–3.1)
LYMPHOCYTES NFR BLD AUTO: 21.9 % (ref 19.6–45.3)
MCH RBC QN AUTO: 30.7 PG (ref 26.6–33)
MCHC RBC AUTO-ENTMCNC: 32.2 G/DL (ref 31.5–35.7)
MCV RBC AUTO: 95.2 FL (ref 79–97)
MONOCYTES # BLD AUTO: 0.66 10*3/MM3 (ref 0.1–0.9)
MONOCYTES NFR BLD AUTO: 9.8 % (ref 5–12)
NEUTROPHILS NFR BLD AUTO: 4.23 10*3/MM3 (ref 1.7–7)
NEUTROPHILS NFR BLD AUTO: 62.7 % (ref 42.7–76)
NRBC BLD AUTO-RTO: 0 /100 WBC (ref 0–0.2)
NT-PROBNP SERPL-MCNC: 1621 PG/ML (ref 0–1800)
PLATELET # BLD AUTO: 181 10*3/MM3 (ref 140–450)
PMV BLD AUTO: 11.5 FL (ref 6–12)
POTASSIUM SERPL-SCNC: 4.1 MMOL/L (ref 3.5–5.2)
PROT SERPL-MCNC: 6.4 G/DL (ref 6–8.5)
QT INTERVAL: 400 MS
QT INTERVAL: 412 MS
QTC INTERVAL: 416 MS
QTC INTERVAL: 431 MS
RBC # BLD AUTO: 4.6 10*6/MM3 (ref 4.14–5.8)
SODIUM SERPL-SCNC: 133 MMOL/L (ref 136–145)
TROPONIN T DELTA: 0 NG/L
TROPONIN T SERPL HS-MCNC: 19 NG/L
WBC NRBC COR # BLD: 6.75 10*3/MM3 (ref 3.4–10.8)
WHOLE BLOOD HOLD COAG: NORMAL
WHOLE BLOOD HOLD SPECIMEN: NORMAL

## 2023-07-23 PROCEDURE — 80053 COMPREHEN METABOLIC PANEL: CPT | Performed by: EMERGENCY MEDICINE

## 2023-07-23 PROCEDURE — 83690 ASSAY OF LIPASE: CPT | Performed by: EMERGENCY MEDICINE

## 2023-07-23 PROCEDURE — 93005 ELECTROCARDIOGRAM TRACING: CPT | Performed by: EMERGENCY MEDICINE

## 2023-07-23 PROCEDURE — 83880 ASSAY OF NATRIURETIC PEPTIDE: CPT | Performed by: EMERGENCY MEDICINE

## 2023-07-23 PROCEDURE — 99284 EMERGENCY DEPT VISIT MOD MDM: CPT

## 2023-07-23 PROCEDURE — 71045 X-RAY EXAM CHEST 1 VIEW: CPT

## 2023-07-23 PROCEDURE — 36415 COLL VENOUS BLD VENIPUNCTURE: CPT

## 2023-07-23 PROCEDURE — 85025 COMPLETE CBC W/AUTO DIFF WBC: CPT | Performed by: EMERGENCY MEDICINE

## 2023-07-23 PROCEDURE — 84484 ASSAY OF TROPONIN QUANT: CPT | Performed by: EMERGENCY MEDICINE

## 2023-07-23 RX ORDER — SODIUM CHLORIDE 0.9 % (FLUSH) 0.9 %
10 SYRINGE (ML) INJECTION AS NEEDED
Status: DISCONTINUED | OUTPATIENT
Start: 2023-07-23 | End: 2023-07-23 | Stop reason: HOSPADM

## 2023-07-23 RX ORDER — ASPIRIN 81 MG/1
324 TABLET, CHEWABLE ORAL ONCE
Status: DISCONTINUED | OUTPATIENT
Start: 2023-07-23 | End: 2023-07-23 | Stop reason: HOSPADM

## 2023-07-23 NOTE — ED PROVIDER NOTES
[place]    EMERGENCY DEPARTMENT ENCOUNTER      Pt Name: Arie Fournier  MRN: [id number]  YOB: 1935  Date of evaluation: 7/23/2023  Provider: Serafin Serrato MD    CHIEF COMPLAINT       Chief Complaint   Patient presents with    Chest Pain         HISTORY OF PRESENT ILLNESS   Arie Fournier is a 88 y.o. male who presents to the emergency department with intermittent burning epigastric pain over the last couple of days.  Patient tells me it feels like heartburn.  The pain is nonradiating and is not associated with any shortness of breath, vomiting, or diaphoresis.  Patient does have history significant for coronary disease but states that this does not feel like previous episodes.  His family was concerned that this could be a heart related issue and so brought him to the emergency department for further evaluation.  He is currently asymptomatic.      Nursing notes were reviewed.    REVIEW OF SYSTEMS     ROS:  A chief complaint appropriate review of systems was completed and is negative except as noted in the HPI.      PAST MEDICAL HISTORY     Past Medical History:   Diagnosis Date    Anxiety     Benign essential hypertension     Benign prostatic hyperplasia     WITH URINARY FREQUENCY    Benign prostatic hypertrophy     Coronary artery disease     Depressive disorder     DJD (degenerative joint disease)     INVOLVING MULTIPLE JOINTS    Dyslipidemia     In March of 2016:  Total cholesterol 137, triglycerides 180, HDL 38, LDL 63.    First degree atrioventricular block     Hyperglycemia     Injury from a fall in 2011     Mixed hyperlipidemia due to type 2 diabetes mellitus     Obesity     Obstructive sleep apnea with C-PAP use.     Osteoarthritis     Primary osteoarthritis involving multiple joints     Recurrent major depressive episodes, moderate     Type 2 diabetes mellitus     WITHOUT HYPERGLYCEMIA WITHOUT LONG TERM CURRENT USE OF INSULIN    Type 2 diabetes, onset 2001.  In 2016, A1c was 6.5.           SURGICAL HISTORY       Past Surgical History:   Procedure Laterality Date    CYST REMOVAL  1976    BACK    HAND SURGERY      TONSILLECTOMY      TUMOR EXCISION      skin excision for tumor         CURRENT MEDICATIONS       Current Facility-Administered Medications:     aspirin chewable tablet 324 mg, 324 mg, Oral, Once, Serafin Serrato MD    sodium chloride 0.9 % flush 10 mL, 10 mL, Intravenous, PRN, Serafin Serrato MD    Current Outpatient Medications:     acetaminophen (TYLENOL) 325 MG tablet, Take 2 tablets by mouth As Needed for Mild Pain., Disp: , Rfl:     apixaban (ELIQUIS) 2.5 MG tablet tablet, Take 1 tablet by mouth 2 (Two) Times a Day., Disp: 60 tablet, Rfl: 11    atorvastatin (LIPITOR) 80 MG tablet, Take 20 mg by mouth Daily. PT SAYS HE TAKES VARIATIONS OF THIS DOSAGE, Disp: , Rfl:     cetirizine (ZyrTEC) 10 MG tablet, Take 1 tablet by mouth Daily., Disp: , Rfl:     coenzyme Q10 100 MG capsule, Take 2 capsules by mouth Daily., Disp: , Rfl:     finasteride (PROSCAR) 5 MG tablet, Take 1 tablet by mouth Daily., Disp: 30 tablet, Rfl: 0    furosemide (LASIX) 40 MG tablet, Take 1 tablet by mouth Daily. (Patient taking differently: Take 20 mg by mouth Daily.), Disp: 90 tablet, Rfl: 1    ipratropium (ATROVENT) 0.06 % nasal spray, 2 sprays into the nostril(s) as directed by provider 4 (Four) Times a Day., Disp: 15 mL, Rfl: 11    lactulose (CHRONULAC) 10 GM/15ML solution, Take 30 mL by mouth 2 (Two) Times a Day As Needed (constipation)., Disp: 946 mL, Rfl: 0    metFORMIN (GLUCOPHAGE) 1000 MG tablet, TAKE 1 TABLET BY MOUTH TWICE DAILY WITH  MORNING  AND  WITH  EVENING  MEAL, Disp: 180 tablet, Rfl: 0    methylcellulose, Laxative, (CITRUCEL) 500 MG tablet tablet, Take 2 tablets by mouth As Needed., Disp: , Rfl:     metoprolol tartrate (LOPRESSOR) 25 MG tablet, Take 1/2 (one-half) tablet by mouth twice daily, Disp: 90 tablet, Rfl: 0    Multiple Vitamin (MULTI VITAMIN PO), Take  by mouth Daily., Disp: , Rfl:      potassium chloride (K-DUR,KLOR-CON) 20 MEQ CR tablet, Take 1 tablet by mouth Daily., Disp: 90 tablet, Rfl: 1    predniSONE (DELTASONE) 20 MG tablet, Take 1 tablet by mouth Daily., Disp: 30 tablet, Rfl: 5    pyridostigmine (MESTINON) 60 MG tablet, Take 1 tablet by mouth 3 (Three) Times a Day., Disp: 90 tablet, Rfl: 2    Saw Palmetto, Serenoa repens, 450 MG capsule, Take  by mouth 2 (Two) Times a Day., Disp: , Rfl:     sucralfate (CARAFATE) 1 g tablet, Take 1 tablet by mouth 2 (Two) Times a Day With Meals., Disp: 60 tablet, Rfl: 2    tamsulosin (FLOMAX) 0.4 MG capsule 24 hr capsule, Take 1 capsule by mouth once daily, Disp: 90 capsule, Rfl: 0    venlafaxine XR (EFFEXOR-XR) 75 MG 24 hr capsule, TAKE 1 CAPSULE BY MOUTH ONCE DAILY WITH FOOD, Disp: 90 capsule, Rfl: 0    zolpidem (AMBIEN) 5 MG tablet, Take 1 tablet by mouth At Night As Needed for Sleep., Disp: 30 tablet, Rfl: 2    ALLERGIES     Other and Tetanus immune globulin    FAMILY HISTORY       Family History   Problem Relation Age of Onset    Stroke Mother     Hypertension Mother     Stroke Father     Hypertension Father     Diabetes Brother     Hypertension Brother     Prostate cancer Brother     Hyperlipidemia Brother           SOCIAL HISTORY       Social History     Socioeconomic History    Marital status:    Tobacco Use    Smoking status: Never     Passive exposure: Never    Smokeless tobacco: Never   Vaping Use    Vaping Use: Never used   Substance and Sexual Activity    Alcohol use: No    Drug use: No    Sexual activity: Defer         PHYSICAL EXAM    (up to 7 for level 4, 8 or more for level 5)     Vitals:    07/23/23 1400 07/23/23 1430 07/23/23 1500 07/23/23 1530   BP: 118/70 109/65 126/76 120/93   BP Location:       Patient Position:       Pulse: 66 63 67 69   Resp:       Temp:       TempSrc:       SpO2: 96% 93% 92% 95%   Weight:       Height:           General: Awake, alert, no acute distress.  HEENT: Conjunctivae normal.  Neck: Trachea  midline.  Cardiac: Heart regular rate, rhythm, no murmurs, rubs, or gallops  Lungs: Lungs are clear to auscultation, there is no wheezing, rhonchi, or rales. There is no use of accessory muscles.  Chest wall: There is no tenderness to palpation over the chest wall or over ribs  Abdomen: Abdomen is soft, nontender, nondistended. There are no firm or pulsatile masses, no rebound rigidity or guarding.   Musculoskeletal: No deformity.  Neuro: Alert and oriented x 4.  Dermatology: Skin is warm and dry  Psych: Mentation is grossly normal, cognition is grossly normal. Affect is appropriate.        DIAGNOSTIC RESULTS     EKG: All EKGs are interpreted by the Emergency Department Physician who either signs or Co-signs this chart in the absence of a cardiologist.    ECG 12 Lead ED Triage Standing Order; Chest Pain   Preliminary Result   Test Reason : ED Triage Standing Order~   Blood Pressure :   */*   mmHG   Vent. Rate :  65 BPM     Atrial Rate : 416 BPM      P-R Int :   * ms          QRS Dur :  90 ms       QT Int : 400 ms       P-R-T Axes :   * -51  63 degrees      QTc Int : 416 ms      Atrial fibrillation   Left axis deviation   Possible Anterior infarct (cited on or before 04-JUL-2023)   Abnormal ECG   When compared with ECG of 23-JUL-2023 12:56, (Unconfirmed)   No significant change was found      Referred By: EDMD           Confirmed By:       ECG 12 Lead ED Triage Standing Order; Chest Pain   Preliminary Result   Test Reason : ED Triage Standing Order~   Blood Pressure :   */*   mmHG   Vent. Rate :  66 BPM     Atrial Rate :  86 BPM      P-R Int :   * ms          QRS Dur :  90 ms       QT Int : 412 ms       P-R-T Axes :   * -41  58 degrees      QTc Int : 431 ms      Atrial fibrillation   Left axis deviation   Possible Anterior infarct (cited on or before 04-JUL-2023)   Abnormal ECG   When compared with ECG of 04-JUL-2023 14:49,   Previous ECG has undetermined rhythm, needs review   Questionable change in initial forces  of Anteroseptal leads      Referred By: ALEX           Confirmed By:             RADIOLOGY:   [x] Radiologist's Report Reviewed:  XR Chest 1 View   Final Result   Impression:      1. Stable chronic right basilar pleural thickening.   2. No active disease.         Electronically Signed: Satnam Claudia     7/23/2023 1:56 PM EDT     Workstation ID: [id number]          I ordered and independently reviewed the above noted radiographic studies.        LABS:    I have reviewed and interpreted all of the currently available lab results from this visit (if applicable):  Results for orders placed or performed during the hospital encounter of 07/23/23   High Sensitivity Troponin T    Specimen: Blood   Result Value Ref Range    HS Troponin T 19 (H) <15 ng/L   Comprehensive Metabolic Panel    Specimen: Blood   Result Value Ref Range    Glucose 140 (H) 65 - 99 mg/dL    BUN 19 8 - 23 mg/dL    Creatinine 0.86 0.76 - 1.27 mg/dL    Sodium 133 (L) 136 - 145 mmol/L    Potassium 4.1 3.5 - 5.2 mmol/L    Chloride 95 (L) 98 - 107 mmol/L    CO2 30.0 (H) 22.0 - 29.0 mmol/L    Calcium 8.9 8.6 - 10.5 mg/dL    Total Protein 6.4 6.0 - 8.5 g/dL    Albumin 3.9 3.5 - 5.2 g/dL    ALT (SGPT) 16 1 - 41 U/L    AST (SGOT) 16 1 - 40 U/L    Alkaline Phosphatase 58 39 - 117 U/L    Total Bilirubin 0.5 0.0 - 1.2 mg/dL    Globulin 2.5 gm/dL    A/G Ratio 1.6 g/dL    BUN/Creatinine Ratio 22.1 7.0 - 25.0    Anion Gap 8.0 5.0 - 15.0 mmol/L    eGFR 83.8 >60.0 mL/min/1.73   Lipase    Specimen: Blood   Result Value Ref Range    Lipase 95 (H) 13 - 60 U/L   BNP    Specimen: Blood   Result Value Ref Range    proBNP 1,621.0 0.0 - 1,800.0 pg/mL   CBC Auto Differential    Specimen: Blood   Result Value Ref Range    WBC 6.75 3.40 - 10.80 10*3/mm3    RBC 4.60 4.14 - 5.80 10*6/mm3    Hemoglobin 14.1 13.0 - 17.7 g/dL    Hematocrit 43.8 37.5 - 51.0 %    MCV 95.2 79.0 - 97.0 fL    MCH 30.7 26.6 - 33.0 pg    MCHC 32.2 31.5 - 35.7 g/dL    RDW 14.5 12.3 - 15.4 %    RDW-SD 50.3 37.0 -  54.0 fl    MPV 11.5 6.0 - 12.0 fL    Platelets 181 140 - 450 10*3/mm3    Neutrophil % 62.7 42.7 - 76.0 %    Lymphocyte % 21.9 19.6 - 45.3 %    Monocyte % 9.8 5.0 - 12.0 %    Eosinophil % 4.9 0.3 - 6.2 %    Basophil % 0.4 0.0 - 1.5 %    Immature Grans % 0.3 0.0 - 0.5 %    Neutrophils, Absolute 4.23 1.70 - 7.00 10*3/mm3    Lymphocytes, Absolute 1.48 0.70 - 3.10 10*3/mm3    Monocytes, Absolute 0.66 0.10 - 0.90 10*3/mm3    Eosinophils, Absolute 0.33 0.00 - 0.40 10*3/mm3    Basophils, Absolute 0.03 0.00 - 0.20 10*3/mm3    Immature Grans, Absolute 0.02 0.00 - 0.05 10*3/mm3    nRBC 0.0 0.0 - 0.2 /100 WBC   High Sensitivity Troponin T 2Hr    Specimen: Blood   Result Value Ref Range    HS Troponin T 19 (H) <15 ng/L    Troponin T Delta 0 >=-4 - <+4 ng/L   ECG 12 Lead ED Triage Standing Order; Chest Pain   Result Value Ref Range    QT Interval 412 ms    QTC Interval 431 ms   ECG 12 Lead ED Triage Standing Order; Chest Pain   Result Value Ref Range    QT Interval 400 ms    QTC Interval 416 ms   Green Top (Gel)   Result Value Ref Range    Extra Tube Hold for add-ons.    Lavender Top   Result Value Ref Range    Extra Tube hold for add-on    Gold Top - SST   Result Value Ref Range    Extra Tube Hold for add-ons.    Gray Top   Result Value Ref Range    Extra Tube Hold for add-ons.    Light Blue Top   Result Value Ref Range    Extra Tube Hold for add-ons.         If labs were ordered, I independently reviewed the results and considered them in treating the patient.      EMERGENCY DEPARTMENT COURSE and DIFFERENTIAL DIAGNOSIS/MDM:   Vitals:  AS OF 19:29 EDT    BP - 120/93  HR - 69  TEMP - 98 °F (36.7 °C) (Oral)  O2 SATS - 95%        Discussion below represents my analysis of pertinent findings related to patient's condition, differential diagnosis, treatment plan and final disposition.      Differential diagnosis:  The differential diagnosis associated with the patient's presentation includes: Gastritis, PUD, ACS, pneumonia,  pneumothorax, pancreatitis      Independent interpretations (ECG/rhythm strip/X-ray/US/CT scan): I independently interpreted the patient's cardiac monitor which shows no evidence of dysrhythmia.  I dependently interpreted the patient's chest x-ray which shows no pulmonary infiltrate.      Additional sources:  Discussed/obtained information from independent historians:   [] Spouse:   [] Parent:   [] Friend:   [] EMS:   [x] Other: Additional history taken from patient family at bedside.  They were concerned that this may be an atypical cardiac presentation.  External (non-ED) record review:   [] Inpatient record:   [] Office record:   [] Outpatient record:   [] Prior Outpatient labs:   [] Prior Outpatient radiology:   [] Primary Care record:   [] Outside ED record:   [x] Other: I reviewed patient's prior record.  He does have history of coronary artery disease as well as atrial fibrillation.      Patient's care impacted by:   [x] Diabetes   [x] Hypertension   [x] Coronary Artery Disease   [] Cancer   [] Other:     Care significantly affected by Social Determinants of Health (housing and economic circumstances, unemployment)    [] Yes     [x] No   If yes, Patient's care significantly limited by  Social Determinants of Health including:    [] Inadequate housing    [] Low income    [] Alcoholism and drug addiction in family    [] Problems related to primary support group    [] Unemployment    [] Problems related to employment    [] Other Social Determinants of Health:       Consideration of admission/observation vs discharge: I considered observation admission for this patient, however he is well-appearing and asymptomatic with atypical symptoms and unremarkable work-up during the course of his ED stay and so felt that he was appropriate for discharge home with close follow-up with his cardiologist.      Additional orders considered but not ordered:  The following testing was considered but ultimately not selected after  discussion with patient/family: I considered abdominal CT to rule out intra-abdominal pathology as a cause for his symptoms, however his abdomen is completely soft and nontender and he is asymptomatic and so felt that this was unnecessary.    ED Course:    ED Course as of 07/23/23 1929   Sun Jul 23, 2023   1811 On reexamination, the patient is requesting discharge.  He has had no ongoing symptoms and has been asymptomatic throughout the duration of his stay in the emergency department.  His troponins x2 have been stable and ECGs appear unchanged from previous.  He has no ongoing symptoms and has had no shortness of breath, hypoxia, radiation of pain to the back, or any other findings that would raise concern for PE, dissection, or other alternate cause of chest pain.  I did discuss admission with him, however he tells me he will follow-up closely with his cardiologist and will return with any new or worsening symptoms and does not wish to stay in the hospital at this time. [NS]      ED Course User Index  [NS] Serafin Serrato MD             I had a discussion with the patient/family regarding diagnosis, diagnostic results, treatment plan, and medications.  The patient/family indicated understanding of these instructions.  I spent adequate time at the bedside preceding discharge necessary to personally discuss the aftercare instructions, giving patient education, providing explanations of the results of our evaluations/findings, and my decision making to assure that the patient/family understand the plan of care.  Time was allotted to answer questions at that time and throughout the ED course.  Emphasis was placed on timely follow-up after discharge.  I also discussed the potential for the development of an acute emergent condition requiring further evaluation, admission, or even surgical intervention. I discussed that we found nothing during the visit today indicating the need for further workup, admission, or the  presence of an unstable medical condition.  I encouraged the patient to return to the emergency department immediately for ANY concerns, worsening, new complaints, or if symptoms persist and unable to seek follow-up in a timely fashion.  The patient/family expressed understanding and agreement with this plan.  The patient will follow-up with their PCP in 1-2 days for reevaluation.           PROCEDURES:  Procedures    CRITICAL CARE TIME        FINAL IMPRESSION      1. Acute chest pain    2. History of coronary artery disease    3. History of diabetes mellitus    4. History of hyperlipidemia          DISPOSITION/PLAN     ED Disposition       ED Disposition   Discharge    Condition   Stable    Comment   --                 Comment: Please note this report has been produced using speech recognition software.      Serafin Serrato MD  Attending Emergency Physician             Serafin Serrato MD  07/23/23 2228

## 2023-07-26 NOTE — PROGRESS NOTES
[facility] Cardiology  Office Progress Note  Arie Fournier  1935  [address]  [place] [address]       Visit Date: 07/28/23    PCP: Mike Bhardwaj MD  [address]  [place] [address]    IDENTIFICATION: A 88 y.o. male retired from [employer], originally from [place], [place], now lives in [place], [place].      PROBLEM LIST:   Atrial Fibrillation, new onset 4/2/23 ?  [id number] = 4 (3/30/2023), initiated on apixaban 2.5 mg bid by PCP  4/23 echo EF >51% rvsp <35 mild AI  Coronary artery disease.   [facility] minimal diffuse disease in RCA and 30% to the proximal LAD. LVEF 50%. Mild elevation of LVEDP ([facility] [place] per Chad Montes De Oca)  4/16 echo EF 60% rvsp 40  Dyslipidemia, on statin  3/2016: 137/180/38/63  11/22: 123/74/49/59  Type 2 diabetes,   onset 2001.   2016 A1c 6.5.   6/18 A1c 6.9  11/22 A1c 6.7  Myasthenia gravis dx 7/21/2023  Constant diplopia and ptosis, trouble clearing secretions, proximal arm and legs giving out  Obstructive sleep apnea with C-PAP use   Benign prostatic hypertrophy.   Surgical history.   Remote tonsillectomy.   Remote left hand surgery.   Remote skin excision for tumor.           CC:   Chief Complaint   Patient presents with    Follow-up     Coronary artery disease involving native coronary artery of native heart without angina pectoris       Allergies  Allergies   Allergen Reactions    Other Unknown - Low Severity     Horse Serum    Tetanus Immune Globulin Unknown - Low Severity       Current Medications    Current Outpatient Medications:     acetaminophen (TYLENOL) 325 MG tablet, Take 2 tablets by mouth As Needed for Mild Pain., Disp: , Rfl:     apixaban (ELIQUIS) 2.5 MG tablet tablet, Take 1 tablet by mouth 2 (Two) Times a Day., Disp: 60 tablet, Rfl: 11    atorvastatin (LIPITOR) 80 MG tablet, Take 20 mg by mouth Daily. PT SAYS HE TAKES VARIATIONS OF THIS DOSAGE, Disp: , Rfl:     cetirizine (ZyrTEC) 10 MG tablet, Take 1 tablet by  mouth Daily., Disp: , Rfl:     coenzyme Q10 100 MG capsule, Take 2 capsules by mouth Daily., Disp: , Rfl:     empagliflozin (JARDIANCE) 10 MG tablet tablet, Take 1 tablet by mouth Daily., Disp: , Rfl:     finasteride (PROSCAR) 5 MG tablet, Take 1 tablet by mouth Daily., Disp: 30 tablet, Rfl: 0    furosemide (LASIX) 40 MG tablet, Take 1 tablet by mouth Daily. (Patient taking differently: Take 0.5 tablets by mouth Daily.), Disp: 90 tablet, Rfl: 1    ipratropium (ATROVENT) 0.06 % nasal spray, 2 sprays into the nostril(s) as directed by provider 4 (Four) Times a Day., Disp: 15 mL, Rfl: 11    Loratadine (Claritin) 10 MG capsule, Take 1 capsule by mouth Daily., Disp: , Rfl:     metFORMIN (GLUCOPHAGE) 1000 MG tablet, TAKE 1 TABLET BY MOUTH TWICE DAILY WITH  MORNING  AND  WITH  EVENING  MEAL, Disp: 180 tablet, Rfl: 0    methylcellulose, Laxative, (CITRUCEL) 500 MG tablet tablet, Take 2 tablets by mouth As Needed., Disp: , Rfl:     metoprolol tartrate (LOPRESSOR) 25 MG tablet, Take 1/2 (one-half) tablet by mouth twice daily, Disp: 90 tablet, Rfl: 0    Multiple Vitamin (MULTI VITAMIN PO), Take  by mouth Daily., Disp: , Rfl:     potassium chloride (K-DUR,KLOR-CON) 20 MEQ CR tablet, Take 1 tablet by mouth Daily., Disp: 90 tablet, Rfl: 1    predniSONE (DELTASONE) 20 MG tablet, Take 1 tablet by mouth Daily., Disp: 30 tablet, Rfl: 5    pyridostigmine (MESTINON) 60 MG tablet, Take 1 tablet by mouth 3 (Three) Times a Day., Disp: 90 tablet, Rfl: 2    Saw Palmetto, Serenoa repens, 450 MG capsule, Take  by mouth 2 (Two) Times a Day., Disp: , Rfl:     sucralfate (CARAFATE) 1 g tablet, Take 1 tablet by mouth 2 (Two) Times a Day With Meals., Disp: 60 tablet, Rfl: 2    tamsulosin (FLOMAX) 0.4 MG capsule 24 hr capsule, Take 1 capsule by mouth once daily, Disp: 90 capsule, Rfl: 0    venlafaxine XR (EFFEXOR-XR) 150 MG 24 hr capsule, Take 1 capsule by mouth Daily., Disp: 30 capsule, Rfl: 5    zolpidem (AMBIEN) 5 MG tablet, Take 1 tablet by  "mouth At Night As Needed for Sleep., Disp: 30 tablet, Rfl: 2      History of Present Illness   Arie Fournier is a 88 y.o. year old male here for hospital follow-up.  He has presented to [facility] ED 2 times in the last month.      The first on 7/4/2023 for transient episodes of diplopia.  MRI brain showed no acute intracranial processes but indicated moderate atrophy.  He was discharged home with neurology follow-ups x2 who diagnosed him with myasthenia gravis and started him on steroids and pyridostigmine.     He presented again to the ED on 7/23/23 with intermittent epigastric pain that he thought was probably heartburn but had concerns for ACS due to his history of CAD. Troponin negative x2, EKG with no acute ischemic changes x2 and patient was discharged home.  He is markedly improved with his work of breathing with the initiation of Jardiance.  He feels that it may be also related to his nose sprays.      OBJECTIVE:  Vitals:    07/28/23 1449   BP: 114/72   BP Location: Left arm   Patient Position: Sitting   Cuff Size: Adult   Pulse: 75   Resp: 20   SpO2: 95%   Weight: 122 kg (268 lb)   Height: 188 cm (74\")     Body mass index is 34.41 kg/m².    Constitutional:       Appearance: Healthy appearance. Not in distress.   Neck:      Vascular: No JVR. JVD normal.   Pulmonary:      Effort: Pulmonary effort is normal.      Breath sounds: Normal breath sounds. No wheezing. No rhonchi. No rales.   Chest:      Chest wall: Not tender to palpatation.   Cardiovascular:      PMI at left midclavicular line. Normal rate. Regular rhythm. Normal S1. Normal S2.       Murmurs: There is a systolic murmur.      No gallop.  No click. No rub.   Pulses:     Intact distal pulses.   Edema:     Peripheral edema absent.   Abdominal:      General: Bowel sounds are normal.      Palpations: Abdomen is soft.      Tenderness: There is no abdominal tenderness.   Musculoskeletal: Normal range of motion.         General: No tenderness. Skin:     " General: Skin is warm and dry.   Neurological:      General: No focal deficit present.      Mental Status: Alert and oriented to person, place and time.       Diagnostic Data:  Procedures        ASSESSMENT:   Diagnosis Plan   1. Coronary artery disease involving native coronary artery of native heart without angina pectoris        2. Primary hypertension        3. Mixed hyperlipidemia        4. Type 2 diabetes mellitus without complication, without long-term current use of insulin            PLAN:  CAD with HFpEF continued current medical regimen    Hypertension controlled currently metoprolol    HL controlled on statin     DM -now on glucocorticoids due to myasthenia continued medical regimen otherwise    Patient elects to follow-up in [place] for geographic convenience      Arie Fatima MD, [facility]

## 2023-07-27 ENCOUNTER — OFFICE VISIT (OUTPATIENT)
Dept: FAMILY MEDICINE CLINIC | Facility: CLINIC | Age: 88
End: 2023-07-27
Payer: MEDICARE

## 2023-07-27 VITALS
DIASTOLIC BLOOD PRESSURE: 90 MMHG | HEIGHT: 74 IN | WEIGHT: 269 LBS | BODY MASS INDEX: 34.52 KG/M2 | OXYGEN SATURATION: 94 % | HEART RATE: 86 BPM | SYSTOLIC BLOOD PRESSURE: 130 MMHG

## 2023-07-27 DIAGNOSIS — F32.A DEPRESSION, UNSPECIFIED DEPRESSION TYPE: Primary | ICD-10-CM

## 2023-07-27 DIAGNOSIS — I10 BENIGN ESSENTIAL HTN: ICD-10-CM

## 2023-07-27 DIAGNOSIS — E11.65 TYPE 2 DIABETES MELLITUS WITH HYPERGLYCEMIA, WITHOUT LONG-TERM CURRENT USE OF INSULIN: ICD-10-CM

## 2023-07-27 DIAGNOSIS — R06.02 SHORTNESS OF BREATH: ICD-10-CM

## 2023-07-27 PROCEDURE — 99214 OFFICE O/P EST MOD 30 MIN: CPT | Performed by: FAMILY MEDICINE

## 2023-07-27 RX ORDER — VENLAFAXINE HYDROCHLORIDE 150 MG/1
150 CAPSULE, EXTENDED RELEASE ORAL DAILY
Qty: 30 CAPSULE | Refills: 5 | Status: SHIPPED | OUTPATIENT
Start: 2023-07-27

## 2023-07-27 NOTE — PROGRESS NOTES
Follow Up Office Visit      Date of Visit:  2023   Patient Name: Arie Fournier  : 1935   MRN: [id number]     Chief Complaint:    Chief Complaint   Patient presents with    ER follow up       History of Present Illness: Arie Fournier is a 88 y.o. male who is here today for follow up.  Recent ER visit for chest discomfort.  Ruled out for MI.  Daughter thought possibly related to some extra stress and anxiety related to dealing with his wife who has some newer issues with memory.  Patient and himself though does have hypertension and hyperlipidemia as well as diabetes.  Overall health has deteriorated some over the past few years.  More short of breath here recently but he started taking the old Actos prescription and not taking the Jardiance.  Seems to be retaining more fluids.        Subjective      Review of Systems:   Review of Systems   Constitutional:  Positive for fatigue. Negative for fever.   HENT:  Negative for congestion and ear pain.    Respiratory:  Positive for shortness of breath. Negative for apnea, cough and chest tightness.    Cardiovascular:  Negative for chest pain.   Gastrointestinal:  Negative for abdominal pain, constipation, diarrhea and nausea.   Musculoskeletal:  Negative for arthralgias.   Psychiatric/Behavioral:  Negative for depressed mood and stress.      Past Medical History:   Past Medical History:   Diagnosis Date    Anxiety     Benign essential hypertension     Benign prostatic hyperplasia     WITH URINARY FREQUENCY    Benign prostatic hypertrophy     Coronary artery disease     Depressive disorder     DJD (degenerative joint disease)     INVOLVING MULTIPLE JOINTS    Dyslipidemia     In 2016:  Total cholesterol 137, triglycerides 180, HDL 38, LDL 63.    First degree atrioventricular block     Hyperglycemia     Injury from a fall in      Mixed hyperlipidemia due to type 2 diabetes mellitus     Obesity     Obstructive sleep apnea with C-PAP use.      Osteoarthritis     Primary osteoarthritis involving multiple joints     Recurrent major depressive episodes, moderate     Type 2 diabetes mellitus     WITHOUT HYPERGLYCEMIA WITHOUT LONG TERM CURRENT USE OF INSULIN    Type 2 diabetes, onset 2001.  In 2016, A1c was 6.5.        Past Surgical History:   Past Surgical History:   Procedure Laterality Date    CYST REMOVAL  1976    BACK    HAND SURGERY      TONSILLECTOMY      TUMOR EXCISION      skin excision for tumor       Family History:   Family History   Problem Relation Age of Onset    Stroke Mother     Hypertension Mother     Stroke Father     Hypertension Father     Diabetes Brother     Hypertension Brother     Prostate cancer Brother     Hyperlipidemia Brother        Social History:   Social History     Socioeconomic History    Marital status:    Tobacco Use    Smoking status: Never     Passive exposure: Never    Smokeless tobacco: Never   Vaping Use    Vaping Use: Never used   Substance and Sexual Activity    Alcohol use: No    Drug use: No    Sexual activity: Defer       Medications:     Current Outpatient Medications:     acetaminophen (TYLENOL) 325 MG tablet, Take 2 tablets by mouth As Needed for Mild Pain., Disp: , Rfl:     apixaban (ELIQUIS) 2.5 MG tablet tablet, Take 1 tablet by mouth 2 (Two) Times a Day., Disp: 60 tablet, Rfl: 11    atorvastatin (LIPITOR) 80 MG tablet, Take 20 mg by mouth Daily. PT SAYS HE TAKES VARIATIONS OF THIS DOSAGE, Disp: , Rfl:     cetirizine (ZyrTEC) 10 MG tablet, Take 1 tablet by mouth Daily., Disp: , Rfl:     coenzyme Q10 100 MG capsule, Take 2 capsules by mouth Daily., Disp: , Rfl:     finasteride (PROSCAR) 5 MG tablet, Take 1 tablet by mouth Daily., Disp: 30 tablet, Rfl: 0    furosemide (LASIX) 40 MG tablet, Take 1 tablet by mouth Daily. (Patient taking differently: Take 20 mg by mouth Daily.), Disp: 90 tablet, Rfl: 1    ipratropium (ATROVENT) 0.06 % nasal spray, 2 sprays into the nostril(s) as directed by provider 4  "(Four) Times a Day., Disp: 15 mL, Rfl: 11    lactulose (CHRONULAC) 10 GM/15ML solution, Take 30 mL by mouth 2 (Two) Times a Day As Needed (constipation)., Disp: 946 mL, Rfl: 0    metFORMIN (GLUCOPHAGE) 1000 MG tablet, TAKE 1 TABLET BY MOUTH TWICE DAILY WITH  MORNING  AND  WITH  EVENING  MEAL, Disp: 180 tablet, Rfl: 0    methylcellulose, Laxative, (CITRUCEL) 500 MG tablet tablet, Take 2 tablets by mouth As Needed., Disp: , Rfl:     metoprolol tartrate (LOPRESSOR) 25 MG tablet, Take 1/2 (one-half) tablet by mouth twice daily, Disp: 90 tablet, Rfl: 0    Multiple Vitamin (MULTI VITAMIN PO), Take  by mouth Daily., Disp: , Rfl:     potassium chloride (K-DUR,KLOR-CON) 20 MEQ CR tablet, Take 1 tablet by mouth Daily., Disp: 90 tablet, Rfl: 1    predniSONE (DELTASONE) 20 MG tablet, Take 1 tablet by mouth Daily., Disp: 30 tablet, Rfl: 5    pyridostigmine (MESTINON) 60 MG tablet, Take 1 tablet by mouth 3 (Three) Times a Day., Disp: 90 tablet, Rfl: 2    Saw Palmetto, Serenoa repens, 450 MG capsule, Take  by mouth 2 (Two) Times a Day., Disp: , Rfl:     sucralfate (CARAFATE) 1 g tablet, Take 1 tablet by mouth 2 (Two) Times a Day With Meals., Disp: 60 tablet, Rfl: 2    tamsulosin (FLOMAX) 0.4 MG capsule 24 hr capsule, Take 1 capsule by mouth once daily, Disp: 90 capsule, Rfl: 0    venlafaxine XR (EFFEXOR-XR) 150 MG 24 hr capsule, Take 1 capsule by mouth Daily., Disp: 30 capsule, Rfl: 5    zolpidem (AMBIEN) 5 MG tablet, Take 1 tablet by mouth At Night As Needed for Sleep., Disp: 30 tablet, Rfl: 2    Allergies:   Allergies   Allergen Reactions    Other      Horse Serum    Tetanus Immune Globulin Unknown - Low Severity       Objective     Physical Exam:  Vital Signs:   Vitals:    07/27/23 1032   BP: 130/90   Pulse: 86   SpO2: 94%   Weight: 122 kg (269 lb)   Height: 188 cm (74\")     Body mass index is 34.54 kg/m².     Physical Exam  Vitals and nursing note reviewed.   Constitutional:       General: He is not in acute distress.     " Appearance: Normal appearance. He is not ill-appearing.   HENT:      Head: Normocephalic and atraumatic.      Right Ear: Tympanic membrane and ear canal normal.      Left Ear: Tympanic membrane and ear canal normal.      Nose: Nose normal.   Cardiovascular:      Rate and Rhythm: Normal rate and regular rhythm.      Heart sounds: Normal heart sounds.   Pulmonary:      Effort: Pulmonary effort is normal.      Breath sounds: Normal breath sounds.   Neurological:      Mental Status: He is alert and oriented to person, place, and time. Mental status is at baseline.   Psychiatric:         Mood and Affect: Mood normal.       Procedures      Assessment / Plan      Assessment/Plan:   Diagnoses and all orders for this visit:    1. Depression, unspecified depression type (Primary)  -     venlafaxine XR (EFFEXOR-XR) 150 MG 24 hr capsule; Take 1 capsule by mouth Daily.  Dispense: 30 capsule; Refill: 5    2. Type 2 diabetes mellitus with hyperglycemia, without long-term current use of insulin    3. Benign essential HTN    4. Shortness of breath         Increased dose of Effexor for anxiety and depression.  I gave him samples and I want him to stay on for the Jardiance.  DC the Actos and stay away from that medication.  He does have upcoming appointment also with cardiology.    Follow Up:   No follow-ups on file.    Mike Bhardwaj  [facility]

## 2023-07-28 ENCOUNTER — OFFICE VISIT (OUTPATIENT)
Dept: CARDIOLOGY | Facility: CLINIC | Age: 88
End: 2023-07-28
Payer: MEDICARE

## 2023-07-28 VITALS
SYSTOLIC BLOOD PRESSURE: 114 MMHG | WEIGHT: 268 LBS | HEIGHT: 74 IN | BODY MASS INDEX: 34.39 KG/M2 | DIASTOLIC BLOOD PRESSURE: 72 MMHG | OXYGEN SATURATION: 95 % | HEART RATE: 75 BPM | RESPIRATION RATE: 20 BRPM

## 2023-07-28 DIAGNOSIS — I25.10 CORONARY ARTERY DISEASE INVOLVING NATIVE CORONARY ARTERY OF NATIVE HEART WITHOUT ANGINA PECTORIS: Primary | ICD-10-CM

## 2023-07-28 DIAGNOSIS — E78.2 MIXED HYPERLIPIDEMIA: ICD-10-CM

## 2023-07-28 DIAGNOSIS — I10 PRIMARY HYPERTENSION: ICD-10-CM

## 2023-07-28 DIAGNOSIS — E11.9 TYPE 2 DIABETES MELLITUS WITHOUT COMPLICATION, WITHOUT LONG-TERM CURRENT USE OF INSULIN: ICD-10-CM

## 2023-07-28 RX ORDER — LORATADINE 10 MG/1
1 CAPSULE, LIQUID FILLED ORAL DAILY
COMMUNITY

## 2023-08-02 LAB
QT INTERVAL: 400 MS
QT INTERVAL: 412 MS
QTC INTERVAL: 416 MS
QTC INTERVAL: 431 MS

## 2023-08-08 ENCOUNTER — OFFICE VISIT (OUTPATIENT)
Dept: NEUROLOGY | Facility: CLINIC | Age: 88
End: 2023-08-08
Payer: MEDICARE

## 2023-08-08 VITALS
SYSTOLIC BLOOD PRESSURE: 110 MMHG | OXYGEN SATURATION: 91 % | WEIGHT: 268.96 LBS | DIASTOLIC BLOOD PRESSURE: 78 MMHG | HEART RATE: 64 BPM | BODY MASS INDEX: 34.52 KG/M2 | HEIGHT: 74 IN

## 2023-08-08 DIAGNOSIS — G70.00 MYASTHENIA GRAVIS WITHOUT EXACERBATION: Primary | Chronic | ICD-10-CM

## 2023-08-08 PROCEDURE — 99214 OFFICE O/P EST MOD 30 MIN: CPT | Performed by: PSYCHIATRY & NEUROLOGY

## 2023-08-08 RX ORDER — PREDNISONE 20 MG/1
20 TABLET ORAL DAILY
Qty: 30 TABLET | Refills: 5 | Status: SHIPPED | OUTPATIENT
Start: 2023-08-08

## 2023-08-08 RX ORDER — MULTIVIT WITH MINERALS/LUTEIN
1000 TABLET ORAL DAILY
COMMUNITY

## 2023-08-08 NOTE — PROGRESS NOTES
"Chief Complaint  myasthenia gravis    Subjective        Arie Fournier presents to [facility] NEUROLOGY  History of Present Illness    88 y.o. male returns in follow up.  Last visit on 7/21/23 rx Prednisone and mestinon.      No longer having issues clearing secretions.  Proximal arm and legs giving out.        Daily but not constant diplopia and ptosis.      SOB exertion.      Always uses arms to get out of chair.  Rests arms when shaving.     Not taking prednisone     Mestinon improving sx.     MG ADL 7     Reviewed medical records:     Onset of Diplopia 7/4/23.       MRI Brain, my review of films, 7/4/23 moderate atrophy.       MUSK negative     AChR binding 6.3, blocking 55, modulating 57     Objective   Vital Signs:  /78   Pulse 64   Ht 188 cm (74.02\")   Wt 122 kg (268 lb 15.4 oz)   SpO2 91%   BMI 34.52 kg/mý   Estimated body mass index is 34.52 kg/mý as calculated from the following:    Height as of this encounter: 188 cm (74.02\").    Weight as of this encounter: 122 kg (268 lb 15.4 oz).           Neurologic Exam     Mental Status   Oriented to person, place, and time.   Speech: speech is normal   Level of consciousness: alert  Knowledge: good and consistent with education.   Normal comprehension.     Cranial Nerves   Cranial nerves II through XII intact.     CN II   Visual fields full to confrontation.   Visual acuity: normal  Right visual field deficit: none  Left visual field deficit: none     CN III, IV, VI   Pupils are equal, round, and reactive to light.  Extraocular motions are normal.   Nystagmus: none   Diplopia: none  Ophthalmoparesis: none  Upgaze: normal  Downgaze: normal  Conjugate gaze: present    CN V   Facial sensation intact.   Right corneal reflex: normal  Left corneal reflex: normal    CN VII   Right facial weakness: none  Left facial weakness: none    CN VIII   Hearing: intact    CN IX, X   Palate: symmetric  Right gag reflex: normal  Left gag reflex: " normal    CN XI   Right sternocleidomastoid strength: normal  Left sternocleidomastoid strength: normal    CN XII   Tongue: not atrophic  Fasciculations: absent  Tongue deviation: none    Motor Exam   Muscle bulk: normal  Overall muscle tone: normal    Strength   Strength 5/5 throughout.     Sensory Exam   Light touch normal.     Gait, Coordination, and Reflexes     Gait  Gait: normal    Tremor   Resting tremor: absent  Intention tremor: absent  Action tremor: absent    Reflexes   Reflexes 2+ except as noted.    Physical Exam  Eyes:      Extraocular Movements: EOM normal.      Pupils: Pupils are equal, round, and reactive to light.   Neurological:      Mental Status: He is oriented to person, place, and time.      Cranial Nerves: Cranial nerves 2-12 are intact.      Motor: Motor strength is normal.      Gait: Gait is intact.   Psychiatric:         Speech: Speech normal.      Result Review :  The following data was reviewed by: Arie Lester MD on 08/08/2023:  Common labs          4/28/2023    13:09 7/4/2023    12:05 7/23/2023    13:27   Common Labs   Glucose TNP  175  140    BUN 19  20  19    Creatinine 0.97  0.93  0.86    Sodium 141  138  133    Potassium TNP  4.5  4.1    Chloride 100  98  95    Calcium 9.4  9.7  8.9    Albumin  4.1  3.9    Total Bilirubin  0.7  0.5    Alkaline Phosphatase  69  58    AST (SGOT)  24  16    ALT (SGPT)  22  16    WBC 5.7  7.57  6.75    Hemoglobin 14.4  16.1  14.1    Hematocrit 42.3  50.4  43.8    Platelets 171  215  181                   Assessment and Plan   Diagnoses and all orders for this visit:    1. Myasthenia gravis without exacerbation (Primary)  Assessment & Plan:  MG ADL 7    Start prednisone 20 mg daily    Continue mestinon       Other orders  -     predniSONE (DELTASONE) 20 MG tablet; Take 1 tablet by mouth Daily.  Dispense: 30 tablet; Refill: 5             Follow Up   Return in about 4 weeks (around 9/5/2023).  Patient was given instructions and counseling regarding  his condition or for health maintenance advice. Please see specific information pulled into the AVS if appropriate.

## 2023-08-11 ENCOUNTER — TELEPHONE (OUTPATIENT)
Dept: CARDIOLOGY | Facility: CLINIC | Age: 88
End: 2023-08-11
Payer: MEDICARE

## 2023-08-11 NOTE — TELEPHONE ENCOUNTER
Caller: Stephanie Fournier    Relationship: Emergency Contact    Best call back number: [phone]    Who is your current provider: DR PEREZ    Who would you like your new provider to be: DR SALAZAR    What are your reasons for transferring care: IT IS MORE CONVENIENT FOR THE PATIENT TO BE SEEN IN [place] AND DR SALAZAR SEES PATIENTS IN [place].    Additional notes: PLEASE CONTACT MS. FOURNIER WHEN TRANSFER IS COMPLETED TO SCHEDULE AN APPOINTMENT.

## 2023-08-14 RX ORDER — FINASTERIDE 5 MG/1
TABLET, FILM COATED ORAL
Qty: 30 TABLET | Refills: 2 | Status: SHIPPED | OUTPATIENT
Start: 2023-08-14

## 2023-08-17 ENCOUNTER — OFFICE VISIT (OUTPATIENT)
Dept: FAMILY MEDICINE CLINIC | Facility: CLINIC | Age: 88
End: 2023-08-17
Payer: MEDICARE

## 2023-08-17 ENCOUNTER — TELEPHONE (OUTPATIENT)
Dept: FAMILY MEDICINE CLINIC | Facility: CLINIC | Age: 88
End: 2023-08-17

## 2023-08-17 VITALS
HEART RATE: 58 BPM | DIASTOLIC BLOOD PRESSURE: 70 MMHG | HEIGHT: 74 IN | BODY MASS INDEX: 32.85 KG/M2 | OXYGEN SATURATION: 97 % | WEIGHT: 256 LBS | SYSTOLIC BLOOD PRESSURE: 112 MMHG

## 2023-08-17 DIAGNOSIS — E11.65 TYPE 2 DIABETES MELLITUS WITH HYPERGLYCEMIA, WITHOUT LONG-TERM CURRENT USE OF INSULIN: Primary | ICD-10-CM

## 2023-08-17 DIAGNOSIS — I25.10 CORONARY ARTERY DISEASE INVOLVING NATIVE CORONARY ARTERY OF NATIVE HEART WITHOUT ANGINA PECTORIS: ICD-10-CM

## 2023-08-17 PROCEDURE — 99214 OFFICE O/P EST MOD 30 MIN: CPT | Performed by: FAMILY MEDICINE

## 2023-08-18 ENCOUNTER — TELEPHONE (OUTPATIENT)
Dept: CASE MANAGEMENT | Facility: OTHER | Age: 88
End: 2023-08-18
Payer: MEDICARE

## 2023-08-22 ENCOUNTER — REFERRAL TRIAGE (OUTPATIENT)
Dept: CASE MANAGEMENT | Facility: OTHER | Age: 88
End: 2023-08-22
Payer: MEDICARE

## 2023-08-23 ENCOUNTER — TELEPHONE (OUTPATIENT)
Dept: CASE MANAGEMENT | Facility: OTHER | Age: 88
End: 2023-08-23
Payer: MEDICARE

## 2023-08-24 ENCOUNTER — PATIENT OUTREACH (OUTPATIENT)
Dept: CASE MANAGEMENT | Facility: OTHER | Age: 88
End: 2023-08-24
Payer: MEDICARE

## 2023-08-24 ENCOUNTER — TELEPHONE (OUTPATIENT)
Dept: CARDIOLOGY | Facility: CLINIC | Age: 88
End: 2023-08-24
Payer: MEDICARE

## 2023-08-24 ENCOUNTER — TELEPHONE (OUTPATIENT)
Dept: CASE MANAGEMENT | Facility: OTHER | Age: 88
End: 2023-08-24
Payer: MEDICARE

## 2023-08-24 DIAGNOSIS — I25.10 CORONARY ARTERY DISEASE INVOLVING NATIVE CORONARY ARTERY OF NATIVE HEART WITHOUT ANGINA PECTORIS: Primary | ICD-10-CM

## 2023-08-24 DIAGNOSIS — F41.9 ANXIETY: Primary | ICD-10-CM

## 2023-08-24 DIAGNOSIS — F32.A DEPRESSION, UNSPECIFIED DEPRESSION TYPE: ICD-10-CM

## 2023-08-24 DIAGNOSIS — E11.9 TYPE 2 DIABETES MELLITUS WITHOUT COMPLICATION, WITHOUT LONG-TERM CURRENT USE OF INSULIN: ICD-10-CM

## 2023-08-24 NOTE — TELEPHONE ENCOUNTER
"Received VM from Toyin  at [facility]. Asking if pt could get a sooner appt.     Spoke with pt. He reports he \"felt bad yesterday\" with dizziness, but \"feels good today\". Denies chest pain, SOB, or swelling. His wife was not available to speak with at the time and the pt did not have a pen to take my direct number down. Advised him I will send him a [employer] message with my contact information so his daughter will have it. Spoke with Toyin to let her know. No further questions at this time.   "

## 2023-08-24 NOTE — OUTREACH NOTE
AMBULATORY CASE MANAGEMENT NOTE    Name and Relationship of Patient/Support Person: Melanie Fournier - Emergency Contact    CCM Interim Update    Spoke with patient's wife and discussed the purpose, goals, and expectations of the program. Reviewed possible costs and ability to stop program at any time. consented to [facility].    Discussed hypertension and cardiac management. Wife states patient checks bp every morning. Vitals within normal limits. He did not write down values. She reports his oxygen values are 96% and pulse 104 currently. Encouraged patient to start writing bp down once daily along with oxygen saturation and pulse. Wife agreed. Encouraged patient to contact [facility] if systolic bp is greater than 160 or less than 90. Wife states patient has been experiencing increased fatigue recently. She states he has moments of shortness of breath. Next follow-up appointment with cardiologist is February 2024. She states she contacted 24/7 nurse line last night regarding symptoms. They advised patient to schedule cardiology visit sooner. Left voicemail at Dr. Dickens's office to receive earlier appointment. Advised patient to seek emergency care if he has difficulty breathing, shortness of breath, decreased oxygenation, or chest pain. Wife agreed.    Discussed diabetes management. Patient check blood sugar daily. His blood sugar is usually within low 100's. Advised patient to continue writing values down and contact [facility] if blood sugar values are greater than 250 or less than 70.     Social determinant questions completed. Wife states patient experiences stress daily from health issues Wife states patient may benefit from counseling. [facility]ed [facility].     Care Coordination    Spoke to Dr. Hoffman nurse, Leonora. She states she spoke with patient regarding symptoms. He states he felt dizzy and fatigue yesterday, but he feels better today. Leonora will send message to patient's mychart with direct line  in case patient has any additional questions or symptoms.         Adult Patient Profile  Questions/Answers      Flowsheet Row Most Recent Value   Symptoms/Conditions Managed at Home cardiovascular, diabetes, type 2   Barriers to Managing Health understanding health advice, stress of chronic illness   Cardiovascular Symptoms/Conditions coronary artery disease, hypertension   Cardiovascular Management Strategies adequate rest, medication therapy   Diabetes Management Strategies blood glucose testing, medication therapy   A1C Target <7%   Last A1C Result 6.7%   Identifying Health Goals keep illness under control   Q1: How often do you have a drink containing alcohol? Never   Q2: How many drinks containing alcohol do you have on a typical day when you are drinking? None   Q3: How often do you have six or more drinks on one occasion? Never   Audit-C Score 0   Current Living Arrangements home   Resource/Environmental Concerns reliable transportation            Social Work Assessment  Questions/Answers      Flowsheet Row Most Recent Value   Current Living Arrangements home   Q1: How often do you have a drink containing alcohol? Never   Q2: How many drinks containing alcohol do you have on a typical day when you are drinking? None   Q3: How often do you have six or more drinks on one occasion? Never   Audit-C Score 0            Send Education  Questions/Answers      Flowsheet Row Most Recent Value   Other Patient Education/Resources  24/7 [facility] Nurse Call Line, Advanced Care Planning   24/7 Nurse Call Line Education Method Verbal   ACP Education Method Verbal            SDOH updated and reviewed with the patient during this program:  Financial Resource Strain: Medium Risk    Difficulty of Paying Living Expenses: Somewhat hard      Physical Activity: Inactive    Days of Exercise per Week: 0 days    Minutes of Exercise per Session: 0 min      Food Insecurity: Food Insecurity Present    Worried About Running Out  of Food in the Last Year: Sometimes true    Ran Out of Food in the Last Year: Sometimes true      Social Connections: Socially Integrated    Frequency of Communication with Friends and Family: More than three times a week    Frequency of Social Gatherings with Friends and Family: Once a week    Attends [employer] Services: More than 4 times per year    Active Member of Clubs or Organizations: Yes    Attends Club or Organization Meetings: Never    Marital Status:       Transportation Needs: No Transportation Needs    Lack of Transportation (Medical): No    Lack of Transportation (Non-Medical): No      Housing Stability: Low Risk     Unable to Pay for Housing in the Last Year: No    Number of Places Lived in the Last Year: 1    Unstable Housing in the Last Year: No      Stress: Stress Concern Present    Feeling of Stress : Very much      Continuing Care   [facility]   [facility]    [address] [place] [address], [address], [facility] [address]    Phone: [phone]       Education Documentation  unresolved or worsening symptoms, taught by Toyin Sylvester RN at 8/24/2023  1:31 PM.  Learner: Patient  Readiness: Acceptance  Method: Explanation  Response: Verbalizes Understanding    tobacco use/smoke exposure, taught by Toyin Sylvester RN at 8/24/2023  1:31 PM.  Learner: Patient  Readiness: Acceptance  Method: Explanation  Response: Verbalizes Understanding    safety, taught by Toyin Sylvester, RN at 8/24/2023  1:31 PM.  Learner: Patient  Readiness: Acceptance  Method: Explanation  Response: Verbalizes Understanding    medication management, taught by Toyin Sylvester RN at 8/24/2023  1:31 PM.  Learner: Patient  Readiness: Acceptance  Method: Explanation  Response: Verbalizes Understanding    food/fluid intake, taught by Toyin Sylvester RN at 8/24/2023  1:31 PM.  Learner: Patient  Readiness: Acceptance  Method: Explanation  Response: Verbalizes Understanding    family planning overview, taught by Toyin Sylvester  IRIS WAITE at 8/24/2023  1:31 PM.  Learner: Patient  Readiness: Acceptance  Method: Explanation  Response: Verbalizes Understanding    drug/alcohol use, taught by Toyin Sylvester RN at 8/24/2023  1:31 PM.  Learner: Patient  Readiness: Acceptance  Method: Explanation  Response: Verbalizes Understanding    coping strategies, taught by Toyin Sylvester RN at 8/24/2023  1:31 PM.  Learner: Patient  Readiness: Acceptance  Method: Explanation  Response: Verbalizes Understanding    activity, taught by Toyin Sylvester RN at 8/24/2023  1:31 PM.  Learner: Patient  Readiness: Acceptance  Method: Explanation  Response: Verbalizes Understanding    preventive care, taught by Toyin Sylvester RN at 8/24/2023  1:31 PM.  Learner: Patient  Readiness: Acceptance  Method: Explanation  Response: Verbalizes Understanding    practice overview, taught by Toyin Sylvester RN at 8/24/2023  1:31 PM.  Learner: Patient  Readiness: Acceptance  Method: Explanation  Response: Verbalizes Understanding    patient-centered medical home, taught by Toyin Sylvester RN at 8/24/2023  1:31 PM.  Learner: Patient  Readiness: Acceptance  Method: Explanation  Response: Verbalizes Understanding    Unresolved/Worsening Symptoms, taught by Toyin Sylvester RN at 8/24/2023  1:31 PM.  Learner: Patient  Readiness: Acceptance  Method: Explanation  Response: Verbalizes Understanding    Self-Care, taught by Toyin Sylvester RN at 8/24/2023  1:31 PM.  Learner: Patient  Readiness: Acceptance  Method: Explanation  Response: Verbalizes Understanding    Provider Follow-Up, taught by Toyin Sylvester RN at 8/24/2023  1:31 PM.  Learner: Patient  Readiness: Acceptance  Method: Explanation  Response: Verbalizes Understanding    Medication Management, taught by Toyin Sylvester RN at 8/24/2023  1:31 PM.  Learner: Patient  Readiness: Acceptance  Method: Explanation  Response: Verbalizes Understanding    Blood Pressure Monitoring, taught by Toyin Sylvester RN at 8/24/2023  1:31  PM.  Learner: Patient  Readiness: Acceptance  Method: Explanation  Response: Verbalizes Understanding    Risk Factors, taught by Toyin Sylvester RN at 8/24/2023  1:31 PM.  Learner: Patient  Readiness: Acceptance  Method: Explanation  Response: Verbalizes Understanding    Description, taught by Toyin Sylvester RN at 8/24/2023  1:31 PM.  Learner: Patient  Readiness: Acceptance  Method: Explanation  Response: Verbalizes Understanding    Unresolved/Worsening Symptoms, taught by Toyin Sylvester RN at 8/24/2023  1:31 PM.  Learner: Patient  Readiness: Acceptance  Method: Explanation  Response: Verbalizes Understanding    Prevention of Complications, taught by Toyin Sylvester RN at 8/24/2023  1:31 PM.  Learner: Patient  Readiness: Acceptance  Method: Explanation  Response: Verbalizes Understanding    Chronic Complications, taught by Toyin Sylvester RN at 8/24/2023  1:31 PM.  Learner: Patient  Readiness: Acceptance  Method: Explanation  Response: Verbalizes Understanding    Risks, taught by Toyin Sylvester RN at 8/24/2023  1:31 PM.  Learner: Patient  Readiness: Acceptance  Method: Explanation  Response: Verbalizes Understanding    Benefits, taught by Toyin Sylvester RN at 8/24/2023  1:31 PM.  Learner: Patient  Readiness: Acceptance  Method: Explanation  Response: Verbalizes Understanding    Adjustment to Diet/Medications, taught by Toyin Sylvester RN at 8/24/2023  1:31 PM.  Learner: Patient  Readiness: Acceptance  Method: Explanation  Response: Verbalizes Understanding    Sick-Day Management, taught by Toyin Sylvester RN at 8/24/2023  1:31 PM.  Learner: Patient  Readiness: Acceptance  Method: Explanation  Response: Verbalizes Understanding    Portion Management, taught by Toyin Sylvester RN at 8/24/2023  1:31 PM.  Learner: Patient  Readiness: Acceptance  Method: Explanation  Response: Verbalizes Understanding    Follow-Up Care, taught by Toyin Sylvester RN at 8/24/2023  1:31 PM.  Learner: Patient  Readiness:  Acceptance  Method: Explanation  Response: Verbalizes Understanding    Consistent Eating Pattern, taught by Toyin Sylvester RN at 8/24/2023  1:31 PM.  Learner: Patient  Readiness: Acceptance  Method: Explanation  Response: Verbalizes Understanding    Carbohydrate Counting, taught by Toyin Sylvester RN at 8/24/2023  1:31 PM.  Learner: Patient  Readiness: Acceptance  Method: Explanation  Response: Verbalizes Understanding    Alcohol, taught by Toyin Sylvester RN at 8/24/2023  1:31 PM.  Learner: Patient  Readiness: Acceptance  Method: Explanation  Response: Verbalizes Understanding    Healthy Food Choices, taught by Toyin Sylvester RN at 8/24/2023  1:31 PM.  Learner: Patient  Readiness: Acceptance  Method: Explanation  Response: Verbalizes Understanding    Carbohydrate-Containing Foods, taught by Toyin Sylvester RN at 8/24/2023  1:31 PM.  Learner: Patient  Readiness: Acceptance  Method: Explanation  Response: Verbalizes Understanding    Hypoglycemia Identification and Treatment, taught by oTyin Sylvester RN at 8/24/2023  1:31 PM.  Learner: Patient  Readiness: Acceptance  Method: Explanation  Response: Verbalizes Understanding    Hyperglycemia Identification and Treatment, taught by Toyin Sylvester RN at 8/24/2023  1:31 PM.  Learner: Patient  Readiness: Acceptance  Method: Explanation  Response: Verbalizes Understanding    Oral Medication, taught by Toyin Sylvester RN at 8/24/2023  1:31 PM.  Learner: Patient  Readiness: Acceptance  Method: Explanation  Response: Verbalizes Understanding    Importance of Glycemic Management, taught by Toyin Sylvester RN at 8/24/2023  1:31 PM.  Learner: Patient  Readiness: Acceptance  Method: Explanation  Response: Verbalizes Understanding    Blood Glucose Monitor Use, taught by Toyin Sylvester RN at 8/24/2023  1:31 PM.  Learner: Patient  Readiness: Acceptance  Method: Explanation  Response: Verbalizes Understanding    Blood Glucose Monitoring, taught by Toyin Sylvester RN at  8/24/2023  1:31 PM.  Learner: Patient  Readiness: Acceptance  Method: Explanation  Response: Verbalizes Understanding    Blood Glucose Goal, taught by Toyin Sylvester RN at 8/24/2023  1:31 PM.  Learner: Patient  Readiness: Acceptance  Method: Explanation  Response: Verbalizes Understanding    A1C Goal, taught by Toyin Sylvester RN at 8/24/2023  1:31 PM.  Learner: Patient  Readiness: Acceptance  Method: Explanation  Response: Verbalizes Understanding    Frequency of Testing, taught by Toyin Sylvester RN at 8/24/2023  1:31 PM.  Learner: Patient  Readiness: Acceptance  Method: Explanation  Response: Verbalizes Understanding    Definition, taught by Toyin Sylvester RN at 8/24/2023  1:31 PM.  Learner: Patient  Readiness: Acceptance  Method: Explanation  Response: Verbalizes Understanding    Diagnosis Criteria, taught by Toyin Sylvester RN at 8/24/2023  1:31 PM.  Learner: Patient  Readiness: Acceptance  Method: Explanation  Response: Verbalizes Understanding    Diabetes, Type 2, taught by Toyin Sylvester RN at 8/24/2023  1:31 PM.  Learner: Patient  Readiness: Acceptance  Method: Explanation  Response: Verbalizes Understanding    Unresolved/Worsening Symptoms, taught by Toyin Sylvester RN at 8/24/2023  1:31 PM.  Learner: Patient  Readiness: Acceptance  Method: Explanation  Response: Verbalizes Understanding    Risk Factors, taught by Toyin Sylvester RN at 8/24/2023  1:31 PM.  Learner: Patient  Readiness: Acceptance  Method: Explanation  Response: Verbalizes Understanding    Rehabilitation Therapy, taught by Toyin Sylvester RN at 8/24/2023  1:31 PM.  Learner: Patient  Readiness: Acceptance  Method: Explanation  Response: Verbalizes Understanding    Provider Follow-Up, taught by Toyin Sylvester RN at 8/24/2023  1:31 PM.  Learner: Patient  Readiness: Acceptance  Method: Explanation  Response: Verbalizes Understanding    Diet, taught by Toyin Sylvester RN at 8/24/2023  1:31 PM.  Learner: Patient  Readiness:  Acceptance  Method: Explanation  Response: Verbalizes Understanding    CPR Education, taught by Toyin Sylvester RN at 8/24/2023  1:31 PM.  Learner: Patient  Readiness: Acceptance  Method: Explanation  Response: Verbalizes Understanding    Activity, taught by Toyin Sylvester RN at 8/24/2023  1:31 PM.  Learner: Patient  Readiness: Acceptance  Method: Explanation  Response: Verbalizes Understanding    Signs/Symptoms, taught by Toyin Sylvester RN at 8/24/2023  1:31 PM.  Learner: Patient  Readiness: Acceptance  Method: Explanation  Response: Verbalizes Understanding    Description, taught by Toyin Sylvester RN at 8/24/2023  1:31 PM.  Learner: Patient  Readiness: Acceptance  Method: Explanation  Response: Verbalizes Understanding          Toyin CASTELLANO  Ambulatory Case Management    8/24/2023, 13:31 EDT

## 2023-08-25 ENCOUNTER — TELEPHONE (OUTPATIENT)
Dept: FAMILY MEDICINE CLINIC | Facility: CLINIC | Age: 88
End: 2023-08-25
Payer: MEDICARE

## 2023-08-25 NOTE — TELEPHONE ENCOUNTER
Caller: Arie Fournier    Relationship: Self    Best call back number: [phone] CAN LEAVE A MESSAGE.    Who are you requesting to speak with (clinical staff, provider,  specific staff member): DR LIAO    What was the call regarding: PATIENT ADVISED HE USED TO TAKE 25MG OF JUARDIANCE BUT LAST REFILL YOU SENT IN FOR HIM IS FOR 10 MG.  PLEASE CALL HIM TO ADVISE WHICH DOSE TO TAKE.  THANK YOU.

## 2023-08-26 NOTE — TELEPHONE ENCOUNTER
I am pretty sure Alejandrina put that in there.  It should have been 25 mg.  Make sure this will also get cleared with the patient assistance program.  It should be 25 mg 1 pill daily.

## 2023-08-28 NOTE — TELEPHONE ENCOUNTER
Discontinued Jardiance 10mg tablets at [facility]. Verbal order given for shipment of Jardiance 25 mg tablets delivered to patient within 7 to 10 business days. Pt notified. He states he has enough Jardiance 25 mg tablets to last two weeks.

## 2023-08-31 ENCOUNTER — PATIENT OUTREACH (OUTPATIENT)
Dept: CASE MANAGEMENT | Facility: OTHER | Age: 88
End: 2023-08-31
Payer: MEDICARE

## 2023-08-31 DIAGNOSIS — E11.9 TYPE 2 DIABETES MELLITUS WITHOUT COMPLICATION, WITHOUT LONG-TERM CURRENT USE OF INSULIN: Primary | ICD-10-CM

## 2023-08-31 DIAGNOSIS — I25.10 CORONARY ARTERY DISEASE INVOLVING NATIVE CORONARY ARTERY OF NATIVE HEART WITHOUT ANGINA PECTORIS: ICD-10-CM

## 2023-08-31 DIAGNOSIS — F32.A DEPRESSION, UNSPECIFIED DEPRESSION TYPE: ICD-10-CM

## 2023-08-31 NOTE — OUTREACH NOTE
[facility] End of Month Documentation    This Chronic Medical Management Care Plan for Arie Fournier, 88 y.o. male, has been a new plan of care implemented; established and a new plan of care implemented for the month of August.  A cumulative time of 72  minutes was spent on this patient record this month, including face to face with provider; phone call with relative; chart review; phone call with other providers.    Regarding the patient's problems: has Coronary artery disease; Dyslipidemia; Type 2 diabetes, onset 2001.  In 2016, A1c was 6.5.; Obstructive sleep apnea with C-PAP use.; Benign prostatic hypertrophy; Anxiety; Osteoarthritis; First degree atrioventricular block; Pleural effusion; Enlarged heart; Benign essential HTN; Abrasion of face; Paroxysmal atrial fibrillation; and Myasthenia gravis without exacerbation on their problem list., the following items were addressed: medical records and any changes can be found within the plan section of the note.  A detailed listing of time spent for chronic care management is tracked within each outreach encounter.  Current medications include:  has a current medication list which includes the following prescription(s): finasteride, acetaminophen, apixaban, ascorbic acid, atorvastatin, cetirizine, coenzyme q10, empagliflozin, furosemide, ipratropium, loratadine, metformin, methylcellulose (laxative), metoprolol tartrate, multiple vitamin, potassium chloride, prednisone, pyridostigmine, saw palmetto (serenoa repens), sucralfate, tamsulosin, venlafaxine xr, and zolpidem. and the patient is reported to be patient is compliant with medication protocol,  Medications are reported to be effective.   All notes on chart for PCP to review.    The patient was monitored remotely for blood glucose; mood & behavior; blood pressure.    The patient's physical needs include:  physical healthcare.     The patient's mental support needs include:  increased support    The patient's cognitive  support needs include:  health care    The patient's psychosocial support needs include:  not applicable    The patient's functional needs include: physical healthcare    The patient's environmental needs include:  no access to transportation    Care Plan overall comments:  No data recorded    Refer to previous outreach notes for more information on the areas listed above.    Monthly Billing Diagnoses  (E11.9) Type 2 diabetes mellitus without complication, without long-term current use of insulin    (F32.A) Depression, unspecified depression type    (I25.10) Coronary artery disease involving native coronary artery of native heart without angina pectoris    Medications   Medications have been reconciled    Care Plan progress this month:      Recently Modified Care Plans Updates made since 7/31/2023 12:00 AM       Coronary Artery Disease (Adult)           Problem Priority Last Modified     Disease Progression (Coronary Artery Disease) --  8/24/2023  1:21 PM by Toyin Sylvester RN              Goal Recent Progress Last Modified     Disease Progression Prevented or Minimized --  8/24/2023  1:21 PM by Toyin Sylvester RN     Evidence-based guidance:   Help patients to develop an awareness of worsening cardiac ischemia, including atypical symptoms that may occur in women, diabetics and the elderly.   Correlate presence and severity of symptoms to response, adherence to therapy and changes in new or existing comorbidity.   Establish a [id number] early intervention process of communication with primary care provider for worsening signs/symptoms.   Address risk factors for disease progression; promote lifestyle changes based on risk.   Acknowledge difficulty in making significant life-long changes, especially considering asymptomatic disease and perception of disease severity.   Individualize approach to change; consider barriers, such as poverty, lack of education, language or health literacy, stress, negotiating  with family and the social significance of changing behavior.   Perform nutrition assessment and develop plan to focus on health and weight management; consider personal and cultural preferences.   Promote a healthy diet that includes primarily plant-based foods, such as fruits, vegetables, whole grains, beans and legumes, low-fat dairy and lean meats.   Consider use of alternative therapy, such as mindfulness-based stress reduction, transcendental meditation, progressive muscle relaxation, stress management and yoga.   Anticipate use of pharmacologic therapy, such as aspirin, antiplatelet, P2Y12 receptor, nitroglycerine, statin, beta-blocker, angiotensin-converting enzyme inhibitor, calcium channel blocker, flu and pneumococcal vaccine.   Monitor efficacy, address side effects, expect periodic adjustments to pharmacologic treatment plan.   Assess and manage barriers to pharmacologic therapy; consider individual habits, preferences and lifestyle; promote the role of the community pharmacist; seek agreement rather than dictate drug regimen; simplify regimen.   Encourage participation in cardiac rehabilitation (home or hospital-based), even when asymptomatic or symptoms are stable.   Engage patient in shared decision-making for additional testing and treatment when symptoms worsen; may include coronary angiography, percutaneous coronary revascularization or coronary artery bypass graft.    Notes:            Task Due Date Last Modified     Alleviate Barriers to Coronary Artery Disease Therapy 11/27/2023 8/24/2023  1:23 PM by Toyin Sylvester, RN     Care Management Activities:      - barriers to treatment adherence reviewed and addressed  - difficulty of making life-long changes acknowledged  - healthy lifestyle promoted  - self-awareness of signs/symptoms of worsening disease encouraged      Notes:                Problem Priority Last Modified     Adjustment to Life Changing Event (Coronary Artery Disease) --   "8/24/2023  1:21 PM by Toyin Sylvester RN              Goal Recent Progress Last Modified     Coping with Life-Changing Event Optimized --  8/24/2023  1:21 PM by Toyin Sylvester RN     Evidence-based guidance:   Acknowledge, normalize and validate emotional response to situation; encourage verbalization of feelings.   Identify beliefs about cardiac illness; assist with expectations and maintaining realistic hope.   Support adjustment to  ?onew normal\" with focus on maintaining daily life as closely as possible to life before cardiac illness.   Monitor perspective regarding quality of life.   Provide support around life transitions and loss, such as adherence to new medical regimen, change in family dynamics or roles.   Recognize current coping strategies and assist in developing new strategies, including family support, music, relaxation techniques, optimism, mindfulness and stress management techniques.   Assess for signs and symptoms of depression, anxiety disorders and posttraumatic stress; if present refer for counseling that offers cognitive behavior therapy.   Anticipate use of antidepressant pharmacologic therapy; monitor efficacy and side effects.    Assess and address sleep apnea and/or postacute coronary syndrome insomnia; promote sleep-hygiene practices and mindfulness, relaxation practices and adherence to noninvasive ventilation.    Prepare for reentry into work and social activities; acknowledge fear regarding losing ability to do what patient was used to doing; encourage participation in cardiac rehabilitation program.   Consider services in the home or community to encourage long-term adherence.   Explore changes in sexual activity and intimacy, including concerns about another cardiac event, physical ability, fear of rejection and change in libido; provide support and encouragement; refer as needed.   Assess access to health insurance coverage, pharmacy benefits and healthcare providers (including " behavioral health); consider telemedicine options for those living in underserved areas.    Notes:            Task Due Date Last Modified     Support Psychosocial Response to Life-Changing Event 11/27/2023 8/24/2023  1:23 PM by Toyin Sylvester RN     Care Management Activities:      - self-care encouraged  - verbalization of feelings encouraged      Notes:                     Diabetes Type 2 (Adult)           Problem Priority Last Modified     Glycemic Management (Diabetes, Type 2) --  8/24/2023  1:21 PM by Toyin Sylvester RN              Goal Recent Progress Last Modified     Glycemic Management Optimized --  8/24/2023  1:21 PM by Toyin Sylvester RN     Evidence-based guidance:   Anticipate A1C testing (point-of-care) every 3 to 6 months based on goal attainment.   Review mutually-set A1C goal or target range.   Anticipate use of antihyperglycemic with or without insulin and periodic adjustments; consider active involvement of pharmacist.   Provide medical nutrition therapy and development of individualized eating.   Compare self-reported symptoms of hypo or hyperglycemia to blood glucose levels, diet and fluid intake, current medications, psychosocial and physiologic stressors, change in activity and barriers to care adherence.   Promote self-monitoring of blood glucose levels.   Assess and address barriers to management plan, such as food insecurity, age, developmental ability, depression, anxiety, fear of hypoglycemia or weight gain, as well as medication cost, side effects and complicated regimen.   Consider referral to community-based diabetes education program, visiting nurse, community health worker or health .   Encourage regular dental care for treatment of periodontal disease; refer to dental provider when needed.    Notes:            Task Due Date Last Modified     Alleviate Barriers to Glycemic Management 11/27/2023 8/24/2023  1:24 PM by Toyin Sylvester RN     Care Management Activities:       - barriers to adherence to treatment plan identified  - blood glucose monitoring encouraged  - resources required to improve adherence to care identified  - use of blood glucose monitoring log promoted      Notes:                Problem Priority Last Modified     Disease Progression (Diabetes, Type 2) --  8/24/2023  1:21 PM by Toyin Sylvester RN              Goal Recent Progress Last Modified     Disease Progression Prevented or Minimized --  8/24/2023  1:21 PM by Toyin Sylvester RN     Evidence-based guidance:   Prepare patient for laboratory and diagnostic exams based on risk and presentation.   Encourage lifestyle changes, such as increased intake of plant-based foods, stress reduction, consistent physical activity and smoking cessation to prevent long-term complications and chronic disease.    Individualize activity and exercise recommendations while considering potential limitations, such as neuropathy, retinopathy or the ability to prevent hyperglycemia or hypoglycemia.    Prepare patient for use of pharmacologic therapy that may include antihypertensive, analgesic, prostaglandin E1 with periodic adjustments, based on presenting chronic condition and laboratory results.   Assess signs/symptoms and risk factors for hypertension, sleep-disordered breathing, neuropathy (including changes in gait and balance), retinopathy, nephropathy and sexual dysfunction.   Address pregnancy planning and contraceptive choice, especially when prescribing antihypertensive or statin.   Ensure completion of annual comprehensive foot exam and dilated eye exam.    Implement additional individualized goals and interventions based on identified risk factors.   Prepare patient for consultation or referral for specialist care, such as ophthalmology, neurology, cardiology, podiatry, nephrology or perinatology.    Notes:            Task Due Date Last Modified     Monitor and Manage Follow-up for Comorbidities 11/27/2023 8/24/2023   1:25 PM by Toyin Sylvester, RN     Care Management Activities:      - activity based on tolerance and functional limitations encouraged  - healthy lifestyle promoted  - reduction of sedentary activity encouraged  - signs/symptoms of comorbidities identified  - vital signs and trends reviewed      Notes:                          Current Specialty Plan of Care Status signed by both patient and provider    Instructions   Patient was provided an electronic copy of care plan  CCM services were explained and offered and patient has accepted these services.  Patient has given their written consent to receive CCM services and understands that this includes the authorization of electronic communication of medical information with the other treating providers.  Patient understands that they may stop CCM services at any time and these changes will be effective at the end of the calendar month and will effectively revocate the agreement of CCM services.  Patient understands that only one practitioner can furnish and be paid for CCM services during one calendar month.  Patient also understands that there may be co-payment or deductible fees in association with CCM services.  Patient will continue with at least monthly follow-up calls with the Ambulatory .    Toyin CASTELLANO  Ambulatory Case Management    8/31/2023, 15:32 EDT

## 2023-09-04 NOTE — PROGRESS NOTES
Follow Up Office Visit      Date of Visit:  2023   Patient Name: Arie Fournier  : 1935   MRN: [id number]     Chief Complaint:    Chief Complaint   Patient presents with    Depression       History of Present Illness: Arie Fournier is a 88 y.o. male who is here today for follow up.  Today we had a discussion regarding his depression as well as diabetes management.  We are also discussing his atrial fibrillation management.  Patient having a hard time affording the medications that he needs.  Currently taking Jardiance for his diabetes and his heart as well as taking Eliquis for his atrial fibrillation.  We have a referral into chronic care management to try to help with getting his medications approved.  We are filling out paperwork today for the Jardiance.  We did have some samples of medication today.        Subjective      Review of Systems:   Review of Systems   Constitutional:  Negative for fatigue and fever.   HENT:  Negative for congestion and ear pain.    Respiratory:  Negative for apnea, cough, chest tightness and shortness of breath.    Cardiovascular:  Negative for chest pain.   Gastrointestinal:  Negative for abdominal pain, constipation, diarrhea and nausea.   Musculoskeletal:  Negative for arthralgias.   Psychiatric/Behavioral:  Negative for depressed mood and stress.      Past Medical History:   Past Medical History:   Diagnosis Date    Anxiety     Benign essential hypertension     Benign prostatic hyperplasia     WITH URINARY FREQUENCY    Benign prostatic hypertrophy     Coronary artery disease     Depressive disorder     DJD (degenerative joint disease)     INVOLVING MULTIPLE JOINTS    Dyslipidemia     In 2016:  Total cholesterol 137, triglycerides 180, HDL 38, LDL 63.    First degree atrioventricular block     Hyperglycemia     Injury from a fall in      Mixed hyperlipidemia due to type 2 diabetes mellitus     Myasthenia gravis     Obesity     Obstructive sleep  apnea with C-PAP use.     Osteoarthritis     Primary osteoarthritis involving multiple joints     Recurrent major depressive episodes, moderate     Type 2 diabetes mellitus     WITHOUT HYPERGLYCEMIA WITHOUT LONG TERM CURRENT USE OF INSULIN    Type 2 diabetes, onset 2001.  In 2016, A1c was 6.5.        Past Surgical History:   Past Surgical History:   Procedure Laterality Date    CYST REMOVAL  1976    BACK    HAND SURGERY      TONSILLECTOMY      TUMOR EXCISION      skin excision for tumor       Family History:   Family History   Problem Relation Age of Onset    Stroke Mother     Hypertension Mother     Stroke Father     Hypertension Father     Diabetes Brother     Hypertension Brother     Prostate cancer Brother     Hyperlipidemia Brother        Social History:   Social History     Socioeconomic History    Marital status:    Tobacco Use    Smoking status: Never     Passive exposure: Never    Smokeless tobacco: Never   Vaping Use    Vaping Use: Never used   Substance and Sexual Activity    Alcohol use: Not Currently    Drug use: Never    Sexual activity: Defer       Medications:     Current Outpatient Medications:     empagliflozin (JARDIANCE) 10 MG tablet tablet, Take 1 tablet by mouth Daily., Disp: 90 tablet, Rfl: 3    finasteride (PROSCAR) 5 MG tablet, Take 1 tablet by mouth once daily, Disp: 30 tablet, Rfl: 2    acetaminophen (TYLENOL) 325 MG tablet, Take 2 tablets by mouth As Needed for Mild Pain., Disp: , Rfl:     apixaban (ELIQUIS) 2.5 MG tablet tablet, Take 1 tablet by mouth 2 (Two) Times a Day., Disp: 60 tablet, Rfl: 11    ascorbic acid (VITAMIN C) 1000 MG tablet, Take 1 tablet by mouth Daily., Disp: , Rfl:     atorvastatin (LIPITOR) 80 MG tablet, Take 20 mg by mouth Daily. PT SAYS HE TAKES VARIATIONS OF THIS DOSAGE, Disp: , Rfl:     cetirizine (ZyrTEC) 10 MG tablet, Take 1 tablet by mouth Daily., Disp: , Rfl:     coenzyme Q10 100 MG capsule, Take 2 capsules by mouth Daily., Disp: , Rfl:     furosemide  (LASIX) 40 MG tablet, Take 1 tablet by mouth Daily. (Patient taking differently: Take 0.5 tablets by mouth Daily.), Disp: 90 tablet, Rfl: 1    ipratropium (ATROVENT) 0.06 % nasal spray, 2 sprays into the nostril(s) as directed by provider 4 (Four) Times a Day., Disp: 15 mL, Rfl: 11    Loratadine 10 MG capsule, Take 1 capsule by mouth Daily., Disp: , Rfl:     metFORMIN (GLUCOPHAGE) 1000 MG tablet, TAKE 1 TABLET BY MOUTH TWICE DAILY WITH  MORNING  AND  EVENING  MEAL, Disp: 180 tablet, Rfl: 1    methylcellulose, Laxative, (CITRUCEL) 500 MG tablet tablet, Take 2 tablets by mouth As Needed., Disp: , Rfl:     metoprolol tartrate (LOPRESSOR) 25 MG tablet, Take 1/2 (one-half) tablet by mouth twice daily, Disp: 90 tablet, Rfl: 0    Multiple Vitamin (MULTI VITAMIN PO), Take  by mouth Daily., Disp: , Rfl:     potassium chloride (K-DUR,KLOR-CON) 20 MEQ CR tablet, Take 1 tablet by mouth Daily., Disp: 90 tablet, Rfl: 1    predniSONE (DELTASONE) 20 MG tablet, Take 1 tablet by mouth Daily., Disp: 30 tablet, Rfl: 5    pyridostigmine (MESTINON) 60 MG tablet, Take 1 tablet by mouth 3 (Three) Times a Day., Disp: 90 tablet, Rfl: 2    Saw Palmetto, Serenoa repens, 450 MG capsule, Take  by mouth 2 (Two) Times a Day., Disp: , Rfl:     sucralfate (CARAFATE) 1 g tablet, Take 1 tablet by mouth 2 (Two) Times a Day With Meals., Disp: 60 tablet, Rfl: 2    tamsulosin (FLOMAX) 0.4 MG capsule 24 hr capsule, Take 1 capsule by mouth once daily, Disp: 90 capsule, Rfl: 0    venlafaxine XR (EFFEXOR-XR) 150 MG 24 hr capsule, Take 1 capsule by mouth Daily., Disp: 30 capsule, Rfl: 5    zolpidem (AMBIEN) 5 MG tablet, Take 1 tablet by mouth At Night As Needed for Sleep., Disp: 30 tablet, Rfl: 2    Allergies:   Allergies   Allergen Reactions    Other Unknown - Low Severity     Horse Serum    Tetanus Immune Globulin Unknown - Low Severity       Objective     Physical Exam:  Vital Signs:   Vitals:    08/17/23 1343   BP: 112/70   Pulse: 58   SpO2: 97%   Weight:  "116 kg (256 lb)   Height: 188 cm (74\")     Body mass index is 32.87 kg/m².     Physical Exam  Vitals and nursing note reviewed.   Constitutional:       General: He is not in acute distress.     Appearance: Normal appearance. He is not ill-appearing.   HENT:      Head: Normocephalic and atraumatic.      Right Ear: Tympanic membrane and ear canal normal.      Left Ear: Tympanic membrane and ear canal normal.      Nose: Nose normal.   Cardiovascular:      Rate and Rhythm: Normal rate and regular rhythm.      Heart sounds: Normal heart sounds.   Pulmonary:      Effort: Pulmonary effort is normal.      Breath sounds: Normal breath sounds.   Neurological:      Mental Status: He is alert and oriented to person, place, and time. Mental status is at baseline.   Psychiatric:         Mood and Affect: Mood normal.       Procedures      Assessment / Plan      Assessment/Plan:   Diagnoses and all orders for this visit:    1. Type 2 diabetes mellitus with hyperglycemia, without long-term current use of insulin (Primary)  -     empagliflozin (JARDIANCE) 10 MG tablet tablet; Take 1 tablet by mouth Daily.  Dispense: 90 tablet; Refill: 3  -     Ambulatory Referral to Chronic Care Management    2. Coronary artery disease involving native coronary artery of native heart without angina pectoris  -     Ambulatory Referral to Chronic Care Management         Did renew the prescriptions for Jardiance.  Also making referral to chronic care management.  Try to get him in programs for both the Jardiance and the Eliquis.    Follow Up:   No follow-ups on file.    Mike Bhardwaj  [facility]   "

## 2023-09-11 RX ORDER — ATORVASTATIN CALCIUM 80 MG/1
TABLET, FILM COATED ORAL
Qty: 45 TABLET | Refills: 2 | Status: SHIPPED | OUTPATIENT
Start: 2023-09-11

## 2023-09-12 ENCOUNTER — OFFICE VISIT (OUTPATIENT)
Dept: NEUROLOGY | Facility: CLINIC | Age: 88
End: 2023-09-12
Payer: MEDICARE

## 2023-09-12 VITALS
DIASTOLIC BLOOD PRESSURE: 70 MMHG | SYSTOLIC BLOOD PRESSURE: 110 MMHG | HEART RATE: 78 BPM | HEIGHT: 74 IN | BODY MASS INDEX: 32.85 KG/M2 | OXYGEN SATURATION: 97 % | WEIGHT: 256 LBS

## 2023-09-12 DIAGNOSIS — G70.00 MYASTHENIA GRAVIS WITHOUT EXACERBATION: Primary | Chronic | ICD-10-CM

## 2023-09-12 PROCEDURE — 1160F RVW MEDS BY RX/DR IN RCRD: CPT | Performed by: PSYCHIATRY & NEUROLOGY

## 2023-09-12 PROCEDURE — 1159F MED LIST DOCD IN RCRD: CPT | Performed by: PSYCHIATRY & NEUROLOGY

## 2023-09-12 PROCEDURE — 99214 OFFICE O/P EST MOD 30 MIN: CPT | Performed by: PSYCHIATRY & NEUROLOGY

## 2023-09-12 RX ORDER — PREDNISONE 10 MG/1
10 TABLET ORAL DAILY
Qty: 30 TABLET | Refills: 11 | Status: SHIPPED | OUTPATIENT
Start: 2023-09-12 | End: 2024-09-11

## 2023-09-12 NOTE — PROGRESS NOTES
"Chief Complaint  Myasthenia gravis    Subjective        Arie Fournier presents to [facility] NEUROLOGY    History of Present Illness    88 y.o. male returns in follow up.  Last visit on 8/8/23 rx Prednisone and mestinon.       No longer having issues clearing secretions.  Proximal arm and legs giving out.        Diplopia is intermittent, no ptosis.       Denies choking, trouble chewing.      SOB exertion.       Always uses arms to get out of chair.  Rests arms when shaving.      Mestinon improving sx.      MG ADL 7     Reviewed medical records:     Onset of Diplopia 7/4/23.       MRI Brain, my review of films, 7/4/23 moderate atrophy.       MUSK negative     AChR binding 6.3, blocking 55, modulating 57         Objective   Vital Signs:  /70   Pulse 78   Ht 188 cm (74\")   Wt 116 kg (256 lb)   SpO2 97%   BMI 32.87 kg/m²   Estimated body mass index is 32.87 kg/m² as calculated from the following:    Height as of this encounter: 188 cm (74\").    Weight as of this encounter: 116 kg (256 lb).           Neurologic Exam     Mental Status   Oriented to person, place, and time.   Speech: speech is normal   Level of consciousness: alert  Knowledge: good and consistent with education.   Normal comprehension.     Cranial Nerves   Cranial nerves II through XII intact.     CN II   Visual fields full to confrontation.   Visual acuity: normal  Right visual field deficit: none  Left visual field deficit: none     CN III, IV, VI   Pupils are equal, round, and reactive to light.  Extraocular motions are normal.   Nystagmus: none   Diplopia: none  Ophthalmoparesis: none  Upgaze: normal  Downgaze: normal  Conjugate gaze: present    CN V   Facial sensation intact.   Right corneal reflex: normal  Left corneal reflex: normal    CN VII   Right facial weakness: none  Left facial weakness: none    CN VIII   Hearing: intact    CN IX, X   Palate: symmetric  Right gag reflex: normal  Left gag reflex: normal    CN XI "   Right sternocleidomastoid strength: normal  Left sternocleidomastoid strength: normal    CN XII   Tongue: not atrophic  Fasciculations: absent  Tongue deviation: none    Motor Exam   Muscle bulk: normal  Overall muscle tone: normal    Strength   Strength 5/5 throughout.     Sensory Exam   Light touch normal.     Gait, Coordination, and Reflexes     Gait  Gait: normal    Tremor   Resting tremor: absent  Intention tremor: absent  Action tremor: absent    Reflexes   Reflexes 2+ except as noted.    Physical Exam  Eyes:      Extraocular Movements: EOM normal.      Pupils: Pupils are equal, round, and reactive to light.   Neurological:      Mental Status: He is oriented to person, place, and time.      Cranial Nerves: Cranial nerves 2-12 are intact.      Motor: Motor strength is normal.      Gait: Gait is intact.   Psychiatric:         Speech: Speech normal.      Result Review :  The following data was reviewed by: Arie Lester MD on 09/12/2023:  Common labs          4/28/2023    13:09 7/4/2023    12:05 7/23/2023    13:27   Common Labs   Glucose TNP  175  140    BUN 19  20  19    Creatinine 0.97  0.93  0.86    Sodium 141  138  133    Potassium TNP  4.5  4.1    Chloride 100  98  95    Calcium 9.4  9.7  8.9    Albumin  4.1  3.9    Total Bilirubin  0.7  0.5    Alkaline Phosphatase  69  58    AST (SGOT)  24  16    ALT (SGPT)  22  16    WBC 5.7  7.57  6.75    Hemoglobin 14.4  16.1  14.1    Hematocrit 42.3  50.4  43.8    Platelets 171  215  181                   Assessment and Plan   Diagnoses and all orders for this visit:    1. Myasthenia gravis without exacerbation (Primary)  Assessment & Plan:  Decrease prednisone 10 mg daily    Continue Mestinon       Other orders  -     predniSONE (DELTASONE) 10 MG tablet; Take 1 tablet by mouth Daily.  Dispense: 30 tablet; Refill: 11             Follow Up   No follow-ups on file.  Patient was given instructions and counseling regarding his condition or for health maintenance  advice. Please see specific information pulled into the AVS if appropriate.

## 2023-09-27 ENCOUNTER — OFFICE VISIT (OUTPATIENT)
Dept: FAMILY MEDICINE CLINIC | Facility: CLINIC | Age: 88
End: 2023-09-27
Payer: MEDICARE

## 2023-09-27 VITALS
WEIGHT: 259 LBS | DIASTOLIC BLOOD PRESSURE: 80 MMHG | BODY MASS INDEX: 33.24 KG/M2 | HEART RATE: 72 BPM | SYSTOLIC BLOOD PRESSURE: 122 MMHG | HEIGHT: 74 IN | OXYGEN SATURATION: 97 %

## 2023-09-27 DIAGNOSIS — L03.113 CELLULITIS OF RIGHT UPPER EXTREMITY: Primary | ICD-10-CM

## 2023-09-27 PROCEDURE — 99213 OFFICE O/P EST LOW 20 MIN: CPT | Performed by: STUDENT IN AN ORGANIZED HEALTH CARE EDUCATION/TRAINING PROGRAM

## 2023-09-27 RX ORDER — CEPHALEXIN 500 MG/1
500 CAPSULE ORAL 2 TIMES DAILY
Qty: 14 CAPSULE | Refills: 0 | Status: SHIPPED | OUTPATIENT
Start: 2023-09-27

## 2023-09-27 NOTE — PROGRESS NOTES
"Chief Complaint  Wound Check (Pt hurt his right hand a week or two ago.)    Subjective          Arie Fournier presents to [facility] PRIMARY CARE  Wound Check      Patient presents to the office for patient on wound of his right hand.  Patient states that he hit his hand about a week and a half ago and had a small area that was bleeding.  He placed a Band-Aid on it and when he took the Band-Aid off it peeled a larger area of the skin back.  He states he has been using over-the-counter first-aid ointment as well as keeping the area clean and dry.  He states that it is not significantly painful, but it is tender if he hits it.  He denies any fevers, chills, or other systemic symptoms.    Objective   Vital Signs:   /80   Pulse 72   Ht 188 cm (74\")   Wt 117 kg (259 lb)   SpO2 97%   BMI 33.25 kg/m²     Body mass index is 33.25 kg/m².    Review of Systems    Past History:  Medical History: has a past medical history of Anxiety, Benign essential hypertension, Benign prostatic hyperplasia, Benign prostatic hypertrophy, Coronary artery disease, Depressive disorder, DJD (degenerative joint disease), Dyslipidemia, First degree atrioventricular block, Hyperglycemia, Injury from a fall in 2011, Mixed hyperlipidemia due to type 2 diabetes mellitus, Myasthenia gravis, Obesity, Obstructive sleep apnea with C-PAP use., Osteoarthritis, Primary osteoarthritis involving multiple joints, Recurrent major depressive episodes, moderate, Type 2 diabetes mellitus, and Type 2 diabetes, onset 2001.  In 2016, A1c was 6.5..   Surgical History: has a past surgical history that includes Tonsillectomy; Hand surgery; Tumor excision; and Cyst Removal (1976).   Family History: family history includes Diabetes in his brother; Hyperlipidemia in his brother; Hypertension in his brother, father, and mother; Prostate cancer in his brother; Stroke in his father and mother.   Social History: reports that he has never smoked. He " has never been exposed to tobacco smoke. He has never used smokeless tobacco. He reports that he does not currently use alcohol. He reports that he does not use drugs.      Current Outpatient Medications:     acetaminophen (TYLENOL) 325 MG tablet, Take 2 tablets by mouth As Needed for Mild Pain., Disp: , Rfl:     apixaban (ELIQUIS) 2.5 MG tablet tablet, Take 1 tablet by mouth 2 (Two) Times a Day., Disp: 60 tablet, Rfl: 11    ascorbic acid (VITAMIN C) 1000 MG tablet, Take 1 tablet by mouth Daily., Disp: , Rfl:     atorvastatin (LIPITOR) 80 MG tablet, Take 1/2 (one-half) tablet by mouth once daily, Disp: 45 tablet, Rfl: 2    cephalexin (Keflex) 500 MG capsule, Take 1 capsule by mouth 2 (Two) Times a Day., Disp: 14 capsule, Rfl: 0    cetirizine (ZyrTEC) 10 MG tablet, Take 1 tablet by mouth Daily., Disp: , Rfl:     coenzyme Q10 100 MG capsule, Take 2 capsules by mouth Daily., Disp: , Rfl:     empagliflozin (JARDIANCE) 10 MG tablet tablet, Take 1 tablet by mouth Daily., Disp: 90 tablet, Rfl: 3    finasteride (PROSCAR) 5 MG tablet, Take 1 tablet by mouth once daily, Disp: 30 tablet, Rfl: 2    furosemide (LASIX) 40 MG tablet, Take 1 tablet by mouth Daily. (Patient taking differently: Take 0.5 tablets by mouth Daily.), Disp: 90 tablet, Rfl: 1    ipratropium (ATROVENT) 0.06 % nasal spray, 2 sprays into the nostril(s) as directed by provider 4 (Four) Times a Day., Disp: 15 mL, Rfl: 11    Loratadine 10 MG capsule, Take 1 capsule by mouth Daily., Disp: , Rfl:     metFORMIN (GLUCOPHAGE) 1000 MG tablet, TAKE 1 TABLET BY MOUTH TWICE DAILY WITH  MORNING  AND  EVENING  MEAL, Disp: 180 tablet, Rfl: 1    methylcellulose, Laxative, (CITRUCEL) 500 MG tablet tablet, Take 2 tablets by mouth As Needed., Disp: , Rfl:     metoprolol tartrate (LOPRESSOR) 25 MG tablet, Take 1/2 (one-half) tablet by mouth twice daily, Disp: 90 tablet, Rfl: 0    Multiple Vitamin (MULTI VITAMIN PO), Take  by mouth Daily., Disp: , Rfl:     potassium chloride  (K-DUR,KLOR-CON) 20 MEQ CR tablet, Take 1 tablet by mouth Daily., Disp: 90 tablet, Rfl: 1    predniSONE (DELTASONE) 10 MG tablet, Take 1 tablet by mouth Daily., Disp: 30 tablet, Rfl: 11    pyridostigmine (MESTINON) 60 MG tablet, Take 1 tablet by mouth 3 (Three) Times a Day., Disp: 90 tablet, Rfl: 2    Saw Palmetto, Serenoa repens, 450 MG capsule, Take  by mouth 2 (Two) Times a Day., Disp: , Rfl:     sucralfate (CARAFATE) 1 g tablet, Take 1 tablet by mouth 2 (Two) Times a Day With Meals., Disp: 60 tablet, Rfl: 2    tamsulosin (FLOMAX) 0.4 MG capsule 24 hr capsule, Take 1 capsule by mouth once daily, Disp: 90 capsule, Rfl: 0    venlafaxine XR (EFFEXOR-XR) 150 MG 24 hr capsule, Take 1 capsule by mouth Daily., Disp: 30 capsule, Rfl: 5    Allergies: Other and Tetanus immune globulin    Physical Exam  Constitutional:       General: He is not in acute distress.     Appearance: He is not ill-appearing or toxic-appearing.   HENT:      Head: Normocephalic and atraumatic.   Pulmonary:      Effort: Pulmonary effort is normal. No respiratory distress.   Skin:     Comments: [place] shaped wound on the top of the hand. Erythema surrounding the area of the wound.    Neurological:      General: No focal deficit present.      Mental Status: He is alert and oriented to person, place, and time.   Psychiatric:         Mood and Affect: Mood normal.         Thought Content: Thought content normal.        Result Review :                   Assessment and Plan    Diagnoses and all orders for this visit:    1. Cellulitis of right upper extremity (Primary)    Other orders  -     cephalexin (Keflex) 500 MG capsule; Take 1 capsule by mouth 2 (Two) Times a Day.  Dispense: 14 capsule; Refill: 0    Will treat with keflex. Advised keeping area clean and dry. Follow u pin the office with any new or worsening symptoms.       Follow Up   No follow-ups on file.  Patient was given instructions and counseling regarding his condition or for health  maintenance advice. Please see specific information pulled into the AVS if appropriate.     Ivana Pope, DO

## 2023-10-11 ENCOUNTER — PATIENT OUTREACH (OUTPATIENT)
Dept: CASE MANAGEMENT | Facility: OTHER | Age: 88
End: 2023-10-11
Payer: MEDICARE

## 2023-10-11 DIAGNOSIS — F32.A DEPRESSION, UNSPECIFIED DEPRESSION TYPE: ICD-10-CM

## 2023-10-11 DIAGNOSIS — I25.10 CORONARY ARTERY DISEASE INVOLVING NATIVE CORONARY ARTERY OF NATIVE HEART WITHOUT ANGINA PECTORIS: ICD-10-CM

## 2023-10-11 DIAGNOSIS — E11.9 TYPE 2 DIABETES MELLITUS WITHOUT COMPLICATION, WITHOUT LONG-TERM CURRENT USE OF INSULIN: Primary | ICD-10-CM

## 2023-10-11 NOTE — OUTREACH NOTE
"AMBULATORY CASE MANAGEMENT NOTE    Name and Relationship of Patient/Support Person: Arie Fournier \"Kayce\" - Self    CCM Interim Update    Discussed recent visit with patient regarding wound on right hand. Patient states he has taken Keflex. His wound is not red or swollen. He keeps site clean and dry. Advised patient to contact ACM if wound becomes increasingly sore, red, swollen, or produces discharge. Patient agreed.     Discussed diabetes management. Patient reports he checks blood sugar daily. His blood sugar typically ranges around 130 to 140. Patient advised to write down values once daily for PCP review. Patient to contact ACM if blood sugar increases above 200 or below 70. Discussed importance of healthy eating and activity as tolerated.    Discussed hypertension and cardiac management. Patient checks bp every morning. His last reading was around 120 to 130 systolic over 70 to 80 diastolic. Requested patient write down values daily. Also encouraged patient to contact ACM if systolic bp is greater than 160 or less than 90. Patient agreed. Patient also reports he has not had episodes of fatigue or shortness of breath recently. His activity level has improved some.       Adult Patient Profile  Questions/Answers      Flowsheet Row Most Recent Value   Symptoms/Conditions Managed at Home skin   Skin Symptoms/Conditions wound  [Right Hand wound]   Skin Management Strategies medication therapy            Education Documentation  Unresolved/Worsening Symptoms, taught by Toyin Sylvester, RN at 10/11/2023 10:38 AM.  Learner: Patient  Readiness: Acceptance  Method: Explanation  Response: Verbalizes Understanding  Comment: Educated patient about assessing wound daily for redness, swelling, or drainage. Also expressed importance of keeping wound clean and dry.    Wound Care, taught by Toyin Sylvester RN at 10/11/2023 10:38 AM.  Learner: Patient  Readiness: Acceptance  Method: Explanation  Response: Verbalizes " Understanding  Comment: Educated patient about assessing wound daily for redness, swelling, or drainage. Also expressed importance of keeping wound clean and dry.    Provider Follow-Up, taught by Toyin Sylvester RN at 10/11/2023 10:38 AM.  Learner: Patient  Readiness: Acceptance  Method: Explanation  Response: Verbalizes Understanding  Comment: Educated patient about assessing wound daily for redness, swelling, or drainage. Also expressed importance of keeping wound clean and dry.    Prevent Skin Breakdown, taught by Toyin Sylvester RN at 10/11/2023 10:38 AM.  Learner: Patient  Readiness: Acceptance  Method: Explanation  Response: Verbalizes Understanding  Comment: Educated patient about assessing wound daily for redness, swelling, or drainage. Also expressed importance of keeping wound clean and dry.    Medication Management, taught by Toyin Sylvester RN at 10/11/2023 10:38 AM.  Learner: Patient  Readiness: Acceptance  Method: Explanation  Response: Verbalizes Understanding  Comment: Educated patient about assessing wound daily for redness, swelling, or drainage. Also expressed importance of keeping wound clean and dry.    Signs/Symptoms, taught by Toyin Sylvester RN at 10/11/2023 10:38 AM.  Learner: Patient  Readiness: Acceptance  Method: Explanation  Response: Verbalizes Understanding  Comment: Educated patient about assessing wound daily for redness, swelling, or drainage. Also expressed importance of keeping wound clean and dry.    Description, taught by Toyin Sylvester RN at 10/11/2023 10:38 AM.  Learner: Patient  Readiness: Acceptance  Method: Explanation  Response: Verbalizes Understanding  Comment: Educated patient about assessing wound daily for redness, swelling, or drainage. Also expressed importance of keeping wound clean and dry.          Toyin CASTELLANO  Ambulatory Case Management    10/11/2023, 10:43 EDT

## 2023-10-17 ENCOUNTER — OFFICE VISIT (OUTPATIENT)
Dept: FAMILY MEDICINE CLINIC | Facility: CLINIC | Age: 88
End: 2023-10-17
Payer: MEDICARE

## 2023-10-17 VITALS
HEIGHT: 74 IN | WEIGHT: 257 LBS | OXYGEN SATURATION: 93 % | HEART RATE: 89 BPM | BODY MASS INDEX: 32.98 KG/M2 | DIASTOLIC BLOOD PRESSURE: 80 MMHG | SYSTOLIC BLOOD PRESSURE: 150 MMHG

## 2023-10-17 DIAGNOSIS — R30.0 DYSURIA: Primary | ICD-10-CM

## 2023-10-17 DIAGNOSIS — E11.65 TYPE 2 DIABETES MELLITUS WITH HYPERGLYCEMIA, WITHOUT LONG-TERM CURRENT USE OF INSULIN: ICD-10-CM

## 2023-10-17 DIAGNOSIS — N40.0 BENIGN PROSTATIC HYPERPLASIA, UNSPECIFIED WHETHER LOWER URINARY TRACT SYMPTOMS PRESENT: ICD-10-CM

## 2023-10-17 LAB
BILIRUB BLD-MCNC: NEGATIVE MG/DL
CLARITY, POC: CLEAR
COLOR UR: YELLOW
EXPIRATION DATE: ABNORMAL
GLUCOSE UR STRIP-MCNC: ABNORMAL MG/DL
KETONES UR QL: ABNORMAL
LEUKOCYTE EST, POC: NEGATIVE
Lab: ABNORMAL
NITRITE UR-MCNC: NEGATIVE MG/ML
PH UR: 5 [PH] (ref 5–8)
PROT UR STRIP-MCNC: NEGATIVE MG/DL
RBC # UR STRIP: ABNORMAL /UL
SP GR UR: 1.01 (ref 1–1.03)
UROBILINOGEN UR QL: ABNORMAL

## 2023-10-17 PROCEDURE — G0008 ADMIN INFLUENZA VIRUS VAC: HCPCS | Performed by: FAMILY MEDICINE

## 2023-10-17 PROCEDURE — 90662 IIV NO PRSV INCREASED AG IM: CPT | Performed by: FAMILY MEDICINE

## 2023-10-17 PROCEDURE — 99214 OFFICE O/P EST MOD 30 MIN: CPT | Performed by: FAMILY MEDICINE

## 2023-10-17 PROCEDURE — 81003 URINALYSIS AUTO W/O SCOPE: CPT | Performed by: FAMILY MEDICINE

## 2023-10-17 RX ORDER — TAMSULOSIN HYDROCHLORIDE 0.4 MG/1
1 CAPSULE ORAL DAILY
Qty: 90 CAPSULE | Refills: 1 | Status: SHIPPED | OUTPATIENT
Start: 2023-10-17

## 2023-10-17 RX ORDER — PYRIDOSTIGMINE BROMIDE 60 MG/1
60 TABLET ORAL 3 TIMES DAILY
Qty: 90 TABLET | Refills: 2 | Status: SHIPPED | OUTPATIENT
Start: 2023-10-17 | End: 2024-10-16

## 2023-10-17 RX ORDER — FINASTERIDE 5 MG/1
5 TABLET, FILM COATED ORAL DAILY
Qty: 90 TABLET | Refills: 1 | Status: SHIPPED | OUTPATIENT
Start: 2023-10-17

## 2023-10-18 NOTE — PROGRESS NOTES
Follow Up Office Visit      Date of Visit:  10/17/2023   Patient Name: Arie Fournier  : 1935   MRN: [id number]     Chief Complaint:    Chief Complaint   Patient presents with    Flu Vaccine       History of Present Illness: Arie Fournier is a 88 y.o. male who is here today for follow up.  Patient seen for some issues related to some urgent continence.  Unsure if this is related to an overactive bladder or his prostate symptoms.  With his age and everything most likely related to his prostate.  Patient also wanted to discuss his diabetes.  Needs refills on medication.  Patient also needs a flu shot.        Subjective      Review of Systems:   Review of Systems   Constitutional:  Negative for fatigue and fever.   HENT:  Negative for congestion and ear pain.    Respiratory:  Negative for apnea, cough, chest tightness and shortness of breath.    Cardiovascular:  Negative for chest pain.   Gastrointestinal:  Negative for abdominal pain, constipation, diarrhea and nausea.   Genitourinary:  Positive for dysuria.   Musculoskeletal:  Negative for arthralgias.   Psychiatric/Behavioral:  Negative for depressed mood and stress.        Past Medical History:   Past Medical History:   Diagnosis Date    Anxiety     Benign essential hypertension     Benign prostatic hyperplasia     WITH URINARY FREQUENCY    Benign prostatic hypertrophy     Coronary artery disease     Depressive disorder     DJD (degenerative joint disease)     INVOLVING MULTIPLE JOINTS    Dyslipidemia     In 2016:  Total cholesterol 137, triglycerides 180, HDL 38, LDL 63.    First degree atrioventricular block     Hyperglycemia     Injury from a fall in      Mixed hyperlipidemia due to type 2 diabetes mellitus     Myasthenia gravis     Obesity     Obstructive sleep apnea with C-PAP use.     Osteoarthritis     Primary osteoarthritis involving multiple joints     Recurrent major depressive episodes, moderate     Type 2 diabetes mellitus      WITHOUT HYPERGLYCEMIA WITHOUT LONG TERM CURRENT USE OF INSULIN    Type 2 diabetes, onset 2001.  In 2016, A1c was 6.5.        Past Surgical History:   Past Surgical History:   Procedure Laterality Date    CYST REMOVAL  1976    BACK    HAND SURGERY      TONSILLECTOMY      TUMOR EXCISION      skin excision for tumor       Family History:   Family History   Problem Relation Age of Onset    Stroke Mother     Hypertension Mother     Stroke Father     Hypertension Father     Diabetes Brother     Hypertension Brother     Prostate cancer Brother     Hyperlipidemia Brother        Social History:   Social History     Socioeconomic History    Marital status:    Tobacco Use    Smoking status: Never     Passive exposure: Never    Smokeless tobacco: Never   Vaping Use    Vaping Use: Never used   Substance and Sexual Activity    Alcohol use: Not Currently    Drug use: Never    Sexual activity: Defer       Medications:     Current Outpatient Medications:     empagliflozin (JARDIANCE) 25 MG tablet tablet, Take 1 tablet by mouth Daily., Disp: , Rfl:     finasteride (PROSCAR) 5 MG tablet, Take 1 tablet by mouth Daily., Disp: 90 tablet, Rfl: 1    pyridostigmine (MESTINON) 60 MG tablet, Take 1 tablet by mouth 3 (Three) Times a Day., Disp: 90 tablet, Rfl: 2    tamsulosin (FLOMAX) 0.4 MG capsule 24 hr capsule, Take 1 capsule by mouth Daily., Disp: 90 capsule, Rfl: 1    acetaminophen (TYLENOL) 325 MG tablet, Take 2 tablets by mouth As Needed for Mild Pain., Disp: , Rfl:     apixaban (ELIQUIS) 2.5 MG tablet tablet, Take 1 tablet by mouth 2 (Two) Times a Day., Disp: 60 tablet, Rfl: 11    ascorbic acid (VITAMIN C) 1000 MG tablet, Take 1 tablet by mouth Daily., Disp: , Rfl:     atorvastatin (LIPITOR) 80 MG tablet, Take 1/2 (one-half) tablet by mouth once daily, Disp: 45 tablet, Rfl: 2    cephalexin (Keflex) 500 MG capsule, Take 1 capsule by mouth 2 (Two) Times a Day., Disp: 14 capsule, Rfl: 0    cetirizine (ZyrTEC) 10 MG tablet, Take  "1 tablet by mouth Daily., Disp: , Rfl:     coenzyme Q10 100 MG capsule, Take 2 capsules by mouth Daily., Disp: , Rfl:     furosemide (LASIX) 40 MG tablet, Take 1 tablet by mouth Daily. (Patient taking differently: Take 0.5 tablets by mouth Daily.), Disp: 90 tablet, Rfl: 1    ipratropium (ATROVENT) 0.06 % nasal spray, 2 sprays into the nostril(s) as directed by provider 4 (Four) Times a Day., Disp: 15 mL, Rfl: 11    Loratadine 10 MG capsule, Take 1 capsule by mouth Daily., Disp: , Rfl:     metFORMIN (GLUCOPHAGE) 1000 MG tablet, TAKE 1 TABLET BY MOUTH TWICE DAILY WITH  MORNING  AND  EVENING  MEAL, Disp: 180 tablet, Rfl: 1    methylcellulose, Laxative, (CITRUCEL) 500 MG tablet tablet, Take 2 tablets by mouth As Needed., Disp: , Rfl:     metoprolol tartrate (LOPRESSOR) 25 MG tablet, Take 1/2 (one-half) tablet by mouth twice daily, Disp: 90 tablet, Rfl: 0    Multiple Vitamin (MULTI VITAMIN PO), Take  by mouth Daily., Disp: , Rfl:     potassium chloride (K-DUR,KLOR-CON) 20 MEQ CR tablet, Take 1 tablet by mouth Daily., Disp: 90 tablet, Rfl: 1    predniSONE (DELTASONE) 10 MG tablet, Take 1 tablet by mouth Daily., Disp: 30 tablet, Rfl: 11    Saw Palmetto, Serenoa repens, 450 MG capsule, Take  by mouth 2 (Two) Times a Day., Disp: , Rfl:     sucralfate (CARAFATE) 1 g tablet, Take 1 tablet by mouth 2 (Two) Times a Day With Meals., Disp: 60 tablet, Rfl: 2    venlafaxine XR (EFFEXOR-XR) 150 MG 24 hr capsule, Take 1 capsule by mouth Daily., Disp: 30 capsule, Rfl: 5    Allergies:   Allergies   Allergen Reactions    Other Unknown - Low Severity     Horse Serum    Tetanus Immune Globulin Unknown - Low Severity       Objective     Physical Exam:  Vital Signs:   Vitals:    10/17/23 1444   BP: 150/80   Pulse: 89   SpO2: 93%   Weight: 117 kg (257 lb)   Height: 188 cm (74\")     Body mass index is 33 kg/m².     Physical Exam  Vitals and nursing note reviewed.   Constitutional:       General: He is not in acute distress.     Appearance: " Normal appearance. He is not ill-appearing.   HENT:      Head: Normocephalic and atraumatic.      Right Ear: Tympanic membrane and ear canal normal.      Left Ear: Tympanic membrane and ear canal normal.      Nose: Nose normal.   Cardiovascular:      Rate and Rhythm: Normal rate and regular rhythm.      Heart sounds: Normal heart sounds.   Pulmonary:      Effort: Pulmonary effort is normal.      Breath sounds: Normal breath sounds.   Neurological:      Mental Status: He is alert and oriented to person, place, and time. Mental status is at baseline.   Psychiatric:         Mood and Affect: Mood normal.         Procedures      Assessment / Plan      Assessment/Plan:   Diagnoses and all orders for this visit:    1. Dysuria (Primary)  -     POCT urinalysis dipstick, automated  -     Urine Culture - Urine, Urine, Clean Catch    2. Type 2 diabetes mellitus with hyperglycemia, without long-term current use of insulin  -     empagliflozin (JARDIANCE) 25 MG tablet tablet; Take 1 tablet by mouth Daily.    3. Benign prostatic hyperplasia, unspecified whether lower urinary tract symptoms present  -     tamsulosin (FLOMAX) 0.4 MG capsule 24 hr capsule; Take 1 capsule by mouth Daily.  Dispense: 90 capsule; Refill: 1  -     finasteride (PROSCAR) 5 MG tablet; Take 1 tablet by mouth Daily.  Dispense: 90 tablet; Refill: 1    Other orders  -     pyridostigmine (MESTINON) 60 MG tablet; Take 1 tablet by mouth 3 (Three) Times a Day.  Dispense: 90 tablet; Refill: 2  -     Fluzone High-Dose 65+yrs ([id number])         Patient given flu shot.  Trying to get a program together to get his more expensive medications.  Urinalysis did not appear to show infection but will culture.  Refilled medications needed today.  No changes in diabetes medication.    Follow Up:   No follow-ups on file.    Mike Bhardwaj  [facility] [place]

## 2023-10-23 LAB
BACTERIA UR CULT: ABNORMAL
OTHER ANTIBIOTIC SUSC ISLT: ABNORMAL

## 2023-10-23 RX ORDER — CIPROFLOXACIN 500 MG/1
500 TABLET, FILM COATED ORAL 2 TIMES DAILY
Qty: 14 TABLET | Refills: 0 | Status: SHIPPED | OUTPATIENT
Start: 2023-10-23

## 2023-10-30 ENCOUNTER — PATIENT OUTREACH (OUTPATIENT)
Dept: CASE MANAGEMENT | Facility: OTHER | Age: 88
End: 2023-10-30
Payer: MEDICARE

## 2023-10-30 DIAGNOSIS — E11.9 TYPE 2 DIABETES MELLITUS WITHOUT COMPLICATION, WITHOUT LONG-TERM CURRENT USE OF INSULIN: Primary | ICD-10-CM

## 2023-10-30 DIAGNOSIS — F32.A DEPRESSION, UNSPECIFIED DEPRESSION TYPE: ICD-10-CM

## 2023-10-30 DIAGNOSIS — I25.10 CORONARY ARTERY DISEASE INVOLVING NATIVE CORONARY ARTERY OF NATIVE HEART WITHOUT ANGINA PECTORIS: ICD-10-CM

## 2023-10-30 NOTE — OUTREACH NOTE
[facility] End of Month Documentation    This Chronic Medical Management Care Plan for Arie Fournier, 88 y.o. male, has been monitored and managed; reviewed and a new plan of care implemented for the month of October.  A cumulative time of 23  minutes was spent on this patient record this month, including chart review; phone call with patient.    Regarding the patient's problems: has Coronary artery disease; Dyslipidemia; Type 2 diabetes, onset 2001.  In 2016, A1c was 6.5.; Obstructive sleep apnea with C-PAP use.; Benign prostatic hypertrophy; Anxiety; Osteoarthritis; First degree atrioventricular block; Pleural effusion; Enlarged heart; Benign essential HTN; Abrasion of face; Paroxysmal atrial fibrillation; and Myasthenia gravis without exacerbation on their problem list., the following items were addressed: medical records and any changes can be found within the plan section of the note.  A detailed listing of time spent for chronic care management is tracked within each outreach encounter.  Current medications include:  has a current medication list which includes the following prescription(s): acetaminophen, apixaban, ascorbic acid, atorvastatin, cephalexin, cetirizine, ciprofloxacin, coenzyme q10, empagliflozin, finasteride, furosemide, ipratropium, loratadine, metformin, methylcellulose (laxative), metoprolol tartrate, multiple vitamin, potassium chloride, prednisone, pyridostigmine, saw palmetto (serenoa repens), sucralfate, tamsulosin, and venlafaxine xr. and the patient is reported to be patient is compliant with medication protocol,  Medications are reported to be effective.   All notes on chart for PCP to review.    The patient was monitored remotely for blood glucose; blood pressure.    The patient's physical needs include:  physical healthcare.     The patient's mental support needs include:  continued support    The patient's cognitive support needs include:  health care    The patient's psychosocial  support needs include:  not applicable    The patient's functional needs include: physical healthcare    The patient's environmental needs include:  no access to transportation    Care Plan overall comments:  No data recorded    Refer to previous outreach notes for more information on the areas listed above.    Monthly Billing Diagnoses  (E11.9) Type 2 diabetes mellitus without complication, without long-term current use of insulin    (F32.A) Depression, unspecified depression type    (I25.10) Coronary artery disease involving native coronary artery of native heart without angina pectoris    Medications   Medications have been reconciled    Care Plan progress this month:      Recently Modified Care Plans Updates made since 9/29/2023 12:00 AM      No recently modified care plans.              Instructions   Patient was provided an electronic copy of care plan  CCM services were explained and offered and patient has accepted these services.  Patient has given their written consent to receive CCM services and understands that this includes the authorization of electronic communication of medical information with the other treating providers.  Patient understands that they may stop CCM services at any time and these changes will be effective at the end of the calendar month and will effectively revocate the agreement of CCM services.  Patient understands that only one practitioner can furnish and be paid for CCM services during one calendar month.  Patient also understands that there may be co-payment or deductible fees in association with CCM services.  Patient will continue with at least monthly follow-up calls with the Ambulatory .    Toyin CASTELLANO  Ambulatory Case Management    10/30/2023, 09:29 EDT

## 2023-11-22 ENCOUNTER — TELEPHONE (OUTPATIENT)
Dept: CASE MANAGEMENT | Facility: OTHER | Age: 88
End: 2023-11-22
Payer: MEDICARE

## 2023-11-27 ENCOUNTER — TELEPHONE (OUTPATIENT)
Dept: CASE MANAGEMENT | Facility: OTHER | Age: 88
End: 2023-11-27
Payer: MEDICARE

## 2023-11-29 ENCOUNTER — PATIENT OUTREACH (OUTPATIENT)
Dept: CASE MANAGEMENT | Facility: OTHER | Age: 88
End: 2023-11-29
Payer: MEDICARE

## 2023-11-29 ENCOUNTER — OFFICE VISIT (OUTPATIENT)
Dept: NEUROLOGY | Facility: CLINIC | Age: 88
End: 2023-11-29
Payer: MEDICARE

## 2023-11-29 VITALS
HEIGHT: 74 IN | OXYGEN SATURATION: 97 % | WEIGHT: 258 LBS | DIASTOLIC BLOOD PRESSURE: 74 MMHG | BODY MASS INDEX: 33.11 KG/M2 | HEART RATE: 84 BPM | SYSTOLIC BLOOD PRESSURE: 118 MMHG

## 2023-11-29 DIAGNOSIS — G70.00 MYASTHENIA GRAVIS WITHOUT EXACERBATION: Primary | Chronic | ICD-10-CM

## 2023-11-29 DIAGNOSIS — E11.9 TYPE 2 DIABETES MELLITUS WITHOUT COMPLICATION, WITHOUT LONG-TERM CURRENT USE OF INSULIN: Primary | ICD-10-CM

## 2023-11-29 DIAGNOSIS — F32.A DEPRESSION, UNSPECIFIED DEPRESSION TYPE: ICD-10-CM

## 2023-11-29 PROCEDURE — 1160F RVW MEDS BY RX/DR IN RCRD: CPT | Performed by: PSYCHIATRY & NEUROLOGY

## 2023-11-29 PROCEDURE — 99213 OFFICE O/P EST LOW 20 MIN: CPT | Performed by: PSYCHIATRY & NEUROLOGY

## 2023-11-29 PROCEDURE — 1159F MED LIST DOCD IN RCRD: CPT | Performed by: PSYCHIATRY & NEUROLOGY

## 2023-11-29 NOTE — PROGRESS NOTES
"Chief Complaint  Myasthenia gravis without exacerbation    Subjective        Arie Fournier presents to [facility]  History of Present Illness    88 y.o.  male returns in follow up.  Last visit on 9/12/23 continued Prednisone and mestinon.       Proximal LE tired with walking.      No longer having issues clearing secretions.  Proximal arm and legs giving out.        Diplopia is rare.       Denies choking, trouble chewing.       SOB exertion.       Always uses arms to get out of chair.  Rests arms when shaving.      Mestinon improving sx.      MG ADL 5     Reviewed medical records:     Onset of Diplopia 7/4/23.       MRI Brain, my review of films, 7/4/23 moderate atrophy.       MUSK negative     AChR binding 6.3, blocking 55, modulating 57        Objective   Vital Signs:  /74   Pulse 84   Ht 188 cm (74.02\")   Wt 117 kg (258 lb)   SpO2 97%   BMI 33.11 kg/m²   Estimated body mass index is 33.11 kg/m² as calculated from the following:    Height as of this encounter: 188 cm (74.02\").    Weight as of this encounter: 117 kg (258 lb).           Neurologic Exam     Mental Status   Oriented to person, place, and time.   Speech: speech is normal   Level of consciousness: alert  Knowledge: good and consistent with education.   Normal comprehension.     Cranial Nerves   Cranial nerves II through XII intact.     CN II   Visual fields full to confrontation.   Visual acuity: normal  Right visual field deficit: none  Left visual field deficit: none     CN III, IV, VI   Pupils are equal, round, and reactive to light.  Extraocular motions are normal.   Nystagmus: none   Diplopia: none  Ophthalmoparesis: none  Upgaze: normal  Downgaze: normal  Conjugate gaze: present    CN V   Facial sensation intact.   Right corneal reflex: normal  Left corneal reflex: normal    CN VII   Right facial weakness: none  Left facial weakness: none    CN VIII   Hearing: intact    CN IX, X   Palate: symmetric  Right " gag reflex: normal  Left gag reflex: normal    CN XI   Right sternocleidomastoid strength: normal  Left sternocleidomastoid strength: normal    CN XII   Tongue: not atrophic  Fasciculations: absent  Tongue deviation: none    Motor Exam   Muscle bulk: normal  Overall muscle tone: normal    Strength   Strength 5/5 throughout.     Sensory Exam   Light touch normal.     Gait, Coordination, and Reflexes     Gait  Gait: normal    Tremor   Resting tremor: absent  Intention tremor: absent  Action tremor: absent    Reflexes   Reflexes 2+ except as noted.        Physical Exam  Eyes:      Extraocular Movements: EOM normal.      Pupils: Pupils are equal, round, and reactive to light.   Neurological:      Mental Status: He is oriented to person, place, and time.      Cranial Nerves: Cranial nerves 2-12 are intact.      Motor: Motor strength is normal.     Gait: Gait is intact.   Psychiatric:         Speech: Speech normal.        Result Review :  The following data was reviewed by: Arie Lester MD on 11/29/2023:  Common labs          4/28/2023    13:09 7/4/2023    12:05 7/23/2023    13:27   Common Labs   Glucose TNP  175  140    BUN 19  20  19    Creatinine 0.97  0.93  0.86    Sodium 141  138  133    Potassium TNP  4.5  4.1    Chloride 100  98  95    Calcium 9.4  9.7  8.9    Albumin  4.1  3.9    Total Bilirubin  0.7  0.5    Alkaline Phosphatase  69  58    AST (SGOT)  24  16    ALT (SGPT)  22  16    WBC 5.7  7.57  6.75    Hemoglobin 14.4  16.1  14.1    Hematocrit 42.3  50.4  43.8    Platelets 171  215  181                   Assessment and Plan   Diagnoses and all orders for this visit:    1. Myasthenia gravis without exacerbation (Primary)  Assessment & Plan:  MG ADL 5    Continue prednisone 10 mg daily     Mestinon 30 - 60 mg TID                Follow Up   No follow-ups on file.  Patient was given instructions and counseling regarding his condition or for health maintenance advice. Please see specific information pulled into  the AVS if appropriate.

## 2023-11-29 NOTE — OUTREACH NOTE
AMBULATORY CASE MANAGEMENT NOTE    Name and Relationship of Patient/Support Person: ShantanuMelanie - Emergency Contact    CCM Interim Update    Discussed patient care with wife. Wife states patient is doing well. He continues to attend PCP and specialty appointments. Wound is healing appropriately. She states patient checks bp and blood sugar daily. His blood sugar this morning was around 140. He states bp is within normal range. Advised patient to write down values for PCP review. No further needs at this time.         Education Documentation  No documentation found.        Toyin CASTELLANO  Ambulatory Case Management    11/29/2023, 15:18 EST

## 2023-12-01 ENCOUNTER — TELEPHONE (OUTPATIENT)
Dept: FAMILY MEDICINE CLINIC | Facility: CLINIC | Age: 88
End: 2023-12-01
Payer: MEDICARE

## 2023-12-01 NOTE — TELEPHONE ENCOUNTER
Caller: HANNAH WITH [facility]    Relationship: Other    Best call back number: [phone]     What form or medical record are you requesting: CERTIFICATE OF MEDICAL NECESSITY FOR CPAP SUPPLIES     Who is requesting this form or medical record from you: HANNAH WITH [facility]    How would you like to receive the form or medical records (pick-up, mail, fax): FAX  If fax, what is the fax number: WILL BE ON COVER LETTER THAT IS BEING FAXED TODAY, 12/01/2023      Timeframe paperwork needed: ASAP       Additional notes: HANNAH HAL [facility] REPORTS THIS WAS ORIGINALLY FAXED TO THE PRACTICE IN 06/2023. PLEASE CALL IF ANY QUESTIONS.

## 2023-12-13 ENCOUNTER — OFFICE VISIT (OUTPATIENT)
Dept: FAMILY MEDICINE CLINIC | Facility: CLINIC | Age: 88
End: 2023-12-13
Payer: MEDICARE

## 2023-12-13 VITALS
WEIGHT: 256 LBS | DIASTOLIC BLOOD PRESSURE: 64 MMHG | HEART RATE: 87 BPM | HEIGHT: 74 IN | OXYGEN SATURATION: 95 % | SYSTOLIC BLOOD PRESSURE: 110 MMHG | BODY MASS INDEX: 32.85 KG/M2

## 2023-12-13 DIAGNOSIS — R30.0 DYSURIA: Primary | ICD-10-CM

## 2023-12-13 LAB
BILIRUB BLD-MCNC: NEGATIVE MG/DL
CLARITY, POC: CLEAR
COLOR UR: YELLOW
EXPIRATION DATE: ABNORMAL
GLUCOSE UR STRIP-MCNC: ABNORMAL MG/DL
KETONES UR QL: NEGATIVE
LEUKOCYTE EST, POC: NEGATIVE
Lab: ABNORMAL
NITRITE UR-MCNC: NEGATIVE MG/ML
PH UR: 5 [PH] (ref 5–8)
PROT UR STRIP-MCNC: NEGATIVE MG/DL
RBC # UR STRIP: NEGATIVE /UL
SP GR UR: 1.01 (ref 1–1.03)
UROBILINOGEN UR QL: ABNORMAL

## 2023-12-13 PROCEDURE — 99214 OFFICE O/P EST MOD 30 MIN: CPT | Performed by: FAMILY MEDICINE

## 2023-12-13 PROCEDURE — 81003 URINALYSIS AUTO W/O SCOPE: CPT | Performed by: FAMILY MEDICINE

## 2023-12-14 NOTE — PROGRESS NOTES
Follow Up Office Visit      Date of Visit:  2023   Patient Name: Arie Fournier  : 1935   MRN: [id number]     Chief Complaint:    Chief Complaint   Patient presents with    weakness       History of Present Illness: Arie Fournier is a 88 y.o. male who is here today for follow up.  Here for some ongoing episodes of weakness.  Patient with atrial fibrillation.  Under the care of cardiology.  Patient did have UTI in October.  Still with issues with urination.        Subjective      Review of Systems:   Review of Systems   Genitourinary:  Positive for dysuria.   Neurological:  Positive for weakness.       Past Medical History:   Past Medical History:   Diagnosis Date    Anxiety     Benign essential hypertension     Benign prostatic hyperplasia     WITH URINARY FREQUENCY    Benign prostatic hypertrophy     Coronary artery disease     Depressive disorder     DJD (degenerative joint disease)     INVOLVING MULTIPLE JOINTS    Dyslipidemia     In 2016:  Total cholesterol 137, triglycerides 180, HDL 38, LDL 63.    First degree atrioventricular block     Hyperglycemia     Injury from a fall in      Mixed hyperlipidemia due to type 2 diabetes mellitus     Myasthenia gravis     Obesity     Obstructive sleep apnea with C-PAP use.     Osteoarthritis     Primary osteoarthritis involving multiple joints     Recurrent major depressive episodes, moderate     Type 2 diabetes mellitus     WITHOUT HYPERGLYCEMIA WITHOUT LONG TERM CURRENT USE OF INSULIN    Type 2 diabetes, onset .  In 2016, A1c was 6.5.        Past Surgical History:   Past Surgical History:   Procedure Laterality Date    CYST REMOVAL      BACK    HAND SURGERY      TONSILLECTOMY      TUMOR EXCISION      skin excision for tumor       Family History:   Family History   Problem Relation Age of Onset    Stroke Mother     Hypertension Mother     Stroke Father     Hypertension Father     Diabetes Brother     Hypertension Brother      Prostate cancer Brother     Hyperlipidemia Brother        Social History:   Social History     Socioeconomic History    Marital status:    Tobacco Use    Smoking status: Never     Passive exposure: Never    Smokeless tobacco: Never   Vaping Use    Vaping Use: Never used   Substance and Sexual Activity    Alcohol use: Not Currently    Drug use: Never    Sexual activity: Defer       Medications:     Current Outpatient Medications:     acetaminophen (TYLENOL) 325 MG tablet, Take 2 tablets by mouth As Needed for Mild Pain., Disp: , Rfl:     apixaban (ELIQUIS) 2.5 MG tablet tablet, Take 1 tablet by mouth 2 (Two) Times a Day., Disp: 60 tablet, Rfl: 11    ascorbic acid (VITAMIN C) 1000 MG tablet, Take 1 tablet by mouth Daily., Disp: , Rfl:     atorvastatin (LIPITOR) 80 MG tablet, Take 1/2 (one-half) tablet by mouth once daily, Disp: 45 tablet, Rfl: 2    cetirizine (ZyrTEC) 10 MG tablet, Take 1 tablet by mouth Daily., Disp: , Rfl:     coenzyme Q10 100 MG capsule, Take 2 capsules by mouth Daily., Disp: , Rfl:     empagliflozin (JARDIANCE) 25 MG tablet tablet, Take 1 tablet by mouth Daily., Disp: , Rfl:     finasteride (PROSCAR) 5 MG tablet, Take 1 tablet by mouth Daily., Disp: 90 tablet, Rfl: 1    ipratropium (ATROVENT) 0.06 % nasal spray, 2 sprays into the nostril(s) as directed by provider 4 (Four) Times a Day., Disp: 15 mL, Rfl: 11    Loratadine 10 MG capsule, Take 1 capsule by mouth Daily., Disp: , Rfl:     metFORMIN (GLUCOPHAGE) 1000 MG tablet, TAKE 1 TABLET BY MOUTH TWICE DAILY WITH  MORNING  AND  EVENING  MEAL, Disp: 180 tablet, Rfl: 1    methylcellulose, Laxative, (CITRUCEL) 500 MG tablet tablet, Take 2 tablets by mouth As Needed., Disp: , Rfl:     metoprolol tartrate (LOPRESSOR) 25 MG tablet, Take 1/2 (one-half) tablet by mouth twice daily, Disp: 90 tablet, Rfl: 0    Multiple Vitamin (MULTI VITAMIN PO), Take  by mouth Daily., Disp: , Rfl:     potassium chloride (K-DUR,KLOR-CON) 20 MEQ CR tablet, Take 1 tablet  "by mouth Daily., Disp: 90 tablet, Rfl: 1    predniSONE (DELTASONE) 10 MG tablet, Take 1 tablet by mouth Daily., Disp: 30 tablet, Rfl: 11    pyridostigmine (MESTINON) 60 MG tablet, Take 1 tablet by mouth 3 (Three) Times a Day., Disp: 90 tablet, Rfl: 2    sucralfate (CARAFATE) 1 g tablet, Take 1 tablet by mouth 2 (Two) Times a Day With Meals., Disp: 60 tablet, Rfl: 2    tamsulosin (FLOMAX) 0.4 MG capsule 24 hr capsule, Take 1 capsule by mouth Daily., Disp: 90 capsule, Rfl: 1    venlafaxine XR (EFFEXOR-XR) 150 MG 24 hr capsule, Take 1 capsule by mouth Daily., Disp: 30 capsule, Rfl: 5    Allergies:   Allergies   Allergen Reactions    Other Unknown - Low Severity     Horse Serum    Tetanus Immune Globulin Unknown - Low Severity       Objective     Physical Exam:  Vital Signs:   Vitals:    12/13/23 1434   BP: 110/64   Pulse: 87   SpO2: 95%   Weight: 116 kg (256 lb)   Height: 188 cm (74\")     Body mass index is 32.87 kg/m².     Physical Exam  Vitals and nursing note reviewed.   Constitutional:       General: He is not in acute distress.     Appearance: Normal appearance. He is not ill-appearing.   HENT:      Head: Normocephalic and atraumatic.      Right Ear: Tympanic membrane and ear canal normal.      Left Ear: Tympanic membrane and ear canal normal.      Nose: Nose normal.   Cardiovascular:      Rate and Rhythm: Normal rate and regular rhythm.      Heart sounds: Normal heart sounds.   Pulmonary:      Effort: Pulmonary effort is normal.      Breath sounds: Normal breath sounds.   Neurological:      Mental Status: He is alert and oriented to person, place, and time. Mental status is at baseline.   Psychiatric:         Mood and Affect: Mood normal.         Procedures      Assessment / Plan      Assessment/Plan:   Diagnoses and all orders for this visit:    1. Dysuria (Primary)  -     POCT urinalysis dipstick, automated  -     Urine Culture - Urine, Urine, Clean Catch         I really think his weakness are more related to " his atrial fibrillation and advanced age.  I am going to recheck a urine and culture to make sure there is no UTI.    Follow Up:   No follow-ups on file.    Mike Bhardwaj  [facility]

## 2023-12-15 LAB
BACTERIA UR CULT: NORMAL
BACTERIA UR CULT: NORMAL

## 2023-12-18 ENCOUNTER — TELEPHONE (OUTPATIENT)
Dept: CASE MANAGEMENT | Facility: OTHER | Age: 88
End: 2023-12-18
Payer: MEDICARE

## 2023-12-19 ENCOUNTER — PATIENT OUTREACH (OUTPATIENT)
Dept: CASE MANAGEMENT | Facility: OTHER | Age: 88
End: 2023-12-19
Payer: MEDICARE

## 2023-12-19 DIAGNOSIS — I25.10 CORONARY ARTERY DISEASE INVOLVING NATIVE CORONARY ARTERY OF NATIVE HEART WITHOUT ANGINA PECTORIS: ICD-10-CM

## 2023-12-19 DIAGNOSIS — F32.A DEPRESSION, UNSPECIFIED DEPRESSION TYPE: ICD-10-CM

## 2023-12-19 DIAGNOSIS — E11.9 TYPE 2 DIABETES MELLITUS WITHOUT COMPLICATION, WITHOUT LONG-TERM CURRENT USE OF INSULIN: Primary | ICD-10-CM

## 2023-12-19 NOTE — OUTREACH NOTE
"AMBULATORY CASE MANAGEMENT NOTE    Name and Relationship of Patient/Support Person: Arie Fournier \"Kayce\" - Self    CCM Interim Update    Spoke to patient briefly. He is currently at wife's medical appointment. He would like call back tomorrow.         Education Documentation  No documentation found.        Toyin CASTELLANO  Ambulatory Case Management    12/19/2023, 14:38 EST  "

## 2023-12-20 ENCOUNTER — PATIENT OUTREACH (OUTPATIENT)
Dept: CASE MANAGEMENT | Facility: OTHER | Age: 88
End: 2023-12-20
Payer: MEDICARE

## 2023-12-20 DIAGNOSIS — E11.9 TYPE 2 DIABETES MELLITUS WITHOUT COMPLICATION, WITHOUT LONG-TERM CURRENT USE OF INSULIN: Primary | ICD-10-CM

## 2023-12-20 DIAGNOSIS — F32.A DEPRESSION, UNSPECIFIED DEPRESSION TYPE: ICD-10-CM

## 2023-12-20 NOTE — OUTREACH NOTE
"AMBULATORY CASE MANAGEMENT NOTE    Name and Relationship of Patient/Support Person: Arie Fournier \"Kayce\" - Self    CCM Interim Update    Discussed diabetes management. Patient states he checks blood sugar daily. His blood sugar ranges from 140 to 150. He continues to monitor carb consumption. Patient reminded to contact [facility] if blood sugar increases above 200 or less than 70. Patient agreed.    Discussed hypertension management. Patient states he checks blood pressure a few times a week. His systolic blood pressure ranges from 110 to 140s and systolic is usually around 70. Patient states he has not experienced any chest pain or shortness of breath.    Patient reports neurology visit went well. He was given prednisone to help with symptoms of myasthenia gravis. He does have trouble with walking and using arms, but he feels managed at this time.         Education Documentation  No documentation found.        Toyin CASTELLANO  Ambulatory Case Management    12/20/2023, 14:18 EST  "

## 2023-12-28 ENCOUNTER — PATIENT OUTREACH (OUTPATIENT)
Dept: CASE MANAGEMENT | Facility: OTHER | Age: 88
End: 2023-12-28
Payer: MEDICARE

## 2023-12-28 DIAGNOSIS — E11.9 TYPE 2 DIABETES MELLITUS WITHOUT COMPLICATION, WITHOUT LONG-TERM CURRENT USE OF INSULIN: Primary | ICD-10-CM

## 2023-12-28 DIAGNOSIS — F32.A DEPRESSION, UNSPECIFIED DEPRESSION TYPE: ICD-10-CM

## 2023-12-28 NOTE — OUTREACH NOTE
[facility] End of Month Documentation    This Chronic Medical Management Care Plan for Arie Fournier, 88 y.o. male, has been monitored and managed; reviewed and a new plan of care implemented for the month of December.  A cumulative time of 220  minutes was spent on this patient record this month, including chart review; phone call with relative; phone call with patient.    Regarding the patient's problems: has Coronary artery disease; Dyslipidemia; Type 2 diabetes, onset 2001.  In 2016, A1c was 6.5.; Obstructive sleep apnea with C-PAP use.; Benign prostatic hypertrophy; Anxiety; Osteoarthritis; First degree atrioventricular block; Pleural effusion; Enlarged heart; Benign essential HTN; Abrasion of face; Paroxysmal atrial fibrillation; and Myasthenia gravis without exacerbation on their problem list., the following items were addressed: medical records and any changes can be found within the plan section of the note.  A detailed listing of time spent for chronic care management is tracked within each outreach encounter.  Current medications include:  has a current medication list which includes the following prescription(s): acetaminophen, apixaban, ascorbic acid, atorvastatin, cetirizine, coenzyme q10, empagliflozin, finasteride, ipratropium, loratadine, metformin, methylcellulose (laxative), metoprolol tartrate, multiple vitamin, potassium chloride, prednisone, pyridostigmine, sucralfate, tamsulosin, and venlafaxine xr. and the patient is reported to be patient is compliant with medication protocol,  Medications are reported to be effective. All notes on chart for PCP to review.    The patient was monitored remotely for blood glucose; blood pressure.    The patient's physical needs include:  physical healthcare.     The patient's mental support needs include:  continued support    The patient's cognitive support needs include:  health care    The patient's psychosocial support needs include:  not applicable    The  Cre improved but not yet at baseline. New C7 ordered, get this week.   patient's functional needs include: physical healthcare    The patient's environmental needs include:  not applicable    Care Plan overall comments:  No data recorded    Refer to previous outreach notes for more information on the areas listed above.    Monthly Billing Diagnoses  (E11.9) Type 2 diabetes mellitus without complication, without long-term current use of insulin    (F32.A) Depression, unspecified depression type    Medications   Medications have been reconciled    Care Plan progress this month:      Recently Modified Care Plans Updates made since 11/27/2023 12:00 AM       Coronary Artery Disease (Adult)           Problem Priority Last Modified     Disease Progression (Coronary Artery Disease) --  8/24/2023  1:21 PM by Toyin Sylvester RN              Goal Recent Progress Last Modified     Disease Progression Prevented or Minimized --  8/24/2023  1:21 PM by Toyin Sylvester RN     Evidence-based guidance:   Help patients to develop an awareness of worsening cardiac ischemia, including atypical symptoms that may occur in women, diabetics and the elderly.   Correlate presence and severity of symptoms to response, adherence to therapy and changes in new or existing comorbidity.   Establish a [id number] early intervention process of communication with primary care provider for worsening signs/symptoms.   Address risk factors for disease progression; promote lifestyle changes based on risk.   Acknowledge difficulty in making significant life-long changes, especially considering asymptomatic disease and perception of disease severity.   Individualize approach to change; consider barriers, such as poverty, lack of education, language or health literacy, stress, negotiating with family and the social significance of changing behavior.   Perform nutrition assessment and develop plan to focus on health and weight management; consider personal and cultural preferences.   Promote a healthy diet that  includes primarily plant-based foods, such as fruits, vegetables, whole grains, beans and legumes, low-fat dairy and lean meats.   Consider use of alternative therapy, such as mindfulness-based stress reduction, transcendental meditation, progressive muscle relaxation, stress management and yoga.   Anticipate use of pharmacologic therapy, such as aspirin, antiplatelet, P2Y12 receptor, nitroglycerine, statin, beta-blocker, angiotensin-converting enzyme inhibitor, calcium channel blocker, flu and pneumococcal vaccine.   Monitor efficacy, address side effects, expect periodic adjustments to pharmacologic treatment plan.   Assess and manage barriers to pharmacologic therapy; consider individual habits, preferences and lifestyle; promote the role of the community pharmacist; seek agreement rather than dictate drug regimen; simplify regimen.   Encourage participation in cardiac rehabilitation (home or hospital-based), even when asymptomatic or symptoms are stable.   Engage patient in shared decision-making for additional testing and treatment when symptoms worsen; may include coronary angiography, percutaneous coronary revascularization or coronary artery bypass graft.    Notes:            Task Due Date Last Modified     Alleviate Barriers to Coronary Artery Disease Therapy 11/27/2023 11/27/2023  8:30 AM by Toyin Sylvester RN     Care Management Activities:      - barriers to treatment adherence reviewed and addressed  - difficulty of making life-long changes acknowledged  - healthy lifestyle promoted  - self-awareness of signs/symptoms of worsening disease encouraged      Notes:                Problem Priority Last Modified     Adjustment to Life Changing Event (Coronary Artery Disease) --  8/24/2023  1:21 PM by Toyin Sylvester RN              Goal Recent Progress Last Modified     Coping with Life-Changing Event Optimized --  8/24/2023  1:21 PM by Toyin Sylvester RN     Evidence-based guidance:   Acknowledge,  "normalize and validate emotional response to situation; encourage verbalization of feelings.   Identify beliefs about cardiac illness; assist with expectations and maintaining realistic hope.   Support adjustment to  €œnew normal\" with focus on maintaining daily life as closely as possible to life before cardiac illness.   Monitor perspective regarding quality of life.   Provide support around life transitions and loss, such as adherence to new medical regimen, change in family dynamics or roles.   Recognize current coping strategies and assist in developing new strategies, including family support, music, relaxation techniques, optimism, mindfulness and stress management techniques.   Assess for signs and symptoms of depression, anxiety disorders and posttraumatic stress; if present refer for counseling that offers cognitive behavior therapy.   Anticipate use of antidepressant pharmacologic therapy; monitor efficacy and side effects.    Assess and address sleep apnea and/or postacute coronary syndrome insomnia; promote sleep-hygiene practices and mindfulness, relaxation practices and adherence to noninvasive ventilation.    Prepare for reentry into work and social activities; acknowledge fear regarding losing ability to do what patient was used to doing; encourage participation in cardiac rehabilitation program.   Consider services in the home or community to encourage long-term adherence.   Explore changes in sexual activity and intimacy, including concerns about another cardiac event, physical ability, fear of rejection and change in libido; provide support and encouragement; refer as needed.   Assess access to health insurance coverage, pharmacy benefits and healthcare providers (including behavioral health); consider telemedicine options for those living in underserved areas.    Notes:            Task Due Date Last Modified     Support Psychosocial Response to Life-Changing Event 11/27/2023 11/27/2023  8:30 AM " by Toyin Sylvester RN     Care Management Activities:      - self-care encouraged  - verbalization of feelings encouraged      Notes:                     Diabetes Type 2 (Adult)           Problem Priority Last Modified     Glycemic Management (Diabetes, Type 2) --  8/24/2023  1:21 PM by Toyin Sylvester RN              Goal Recent Progress Last Modified     Glycemic Management Optimized --  8/24/2023  1:21 PM by Toyin Sylvester RN     Evidence-based guidance:   Anticipate A1C testing (point-of-care) every 3 to 6 months based on goal attainment.   Review mutually-set A1C goal or target range.   Anticipate use of antihyperglycemic with or without insulin and periodic adjustments; consider active involvement of pharmacist.   Provide medical nutrition therapy and development of individualized eating.   Compare self-reported symptoms of hypo or hyperglycemia to blood glucose levels, diet and fluid intake, current medications, psychosocial and physiologic stressors, change in activity and barriers to care adherence.   Promote self-monitoring of blood glucose levels.   Assess and address barriers to management plan, such as food insecurity, age, developmental ability, depression, anxiety, fear of hypoglycemia or weight gain, as well as medication cost, side effects and complicated regimen.   Consider referral to community-based diabetes education program, visiting nurse, community health worker or health .   Encourage regular dental care for treatment of periodontal disease; refer to dental provider when needed.    Notes:            Task Due Date Last Modified     Alleviate Barriers to Glycemic Management 11/27/2023 11/27/2023  8:30 AM by Toyin Sylvester RN     Care Management Activities:      - barriers to adherence to treatment plan identified  - blood glucose monitoring encouraged  - resources required to improve adherence to care identified  - use of blood glucose monitoring log promoted      Notes:                 Problem Priority Last Modified     Disease Progression (Diabetes, Type 2) --  8/24/2023  1:21 PM by Toyin Sylvester RN              Goal Recent Progress Last Modified     Disease Progression Prevented or Minimized --  8/24/2023  1:21 PM by Toyin Sylvester RN     Evidence-based guidance:   Prepare patient for laboratory and diagnostic exams based on risk and presentation.   Encourage lifestyle changes, such as increased intake of plant-based foods, stress reduction, consistent physical activity and smoking cessation to prevent long-term complications and chronic disease.    Individualize activity and exercise recommendations while considering potential limitations, such as neuropathy, retinopathy or the ability to prevent hyperglycemia or hypoglycemia.    Prepare patient for use of pharmacologic therapy that may include antihypertensive, analgesic, prostaglandin E1 with periodic adjustments, based on presenting chronic condition and laboratory results.   Assess signs/symptoms and risk factors for hypertension, sleep-disordered breathing, neuropathy (including changes in gait and balance), retinopathy, nephropathy and sexual dysfunction.   Address pregnancy planning and contraceptive choice, especially when prescribing antihypertensive or statin.   Ensure completion of annual comprehensive foot exam and dilated eye exam.    Implement additional individualized goals and interventions based on identified risk factors.   Prepare patient for consultation or referral for specialist care, such as ophthalmology, neurology, cardiology, podiatry, nephrology or perinatology.    Notes:            Task Due Date Last Modified     Monitor and Manage Follow-up for Comorbidities 11/27/2023 11/27/2023  8:30 AM by Toyin Sylvester RN     Care Management Activities:      - activity based on tolerance and functional limitations encouraged  - healthy lifestyle promoted  - reduction of sedentary activity encouraged  - signs/symptoms  of comorbidities identified  - vital signs and trends reviewed      Notes:                            Instructions   Patient was provided an electronic copy of care plan  CCM services were explained and offered and patient has accepted these services.  Patient has given their written consent to receive CCM services and understands that this includes the authorization of electronic communication of medical information with the other treating providers.  Patient understands that they may stop CCM services at any time and these changes will be effective at the end of the calendar month and will effectively revocate the agreement of CCM services.  Patient understands that only one practitioner can furnish and be paid for CCM services during one calendar month.  Patient also understands that there may be co-payment or deductible fees in association with CCM services.  Patient will continue with at least monthly follow-up calls with the Ambulatory .    Toyin CASTELLANO  Ambulatory Case Management    12/28/2023, 10:49 EST

## 2024-01-02 RX ORDER — POTASSIUM CHLORIDE 20 MEQ/1
20 TABLET, EXTENDED RELEASE ORAL DAILY
Qty: 90 TABLET | Refills: 0 | Status: SHIPPED | OUTPATIENT
Start: 2024-01-02

## 2024-01-29 ENCOUNTER — PATIENT OUTREACH (OUTPATIENT)
Dept: CASE MANAGEMENT | Facility: OTHER | Age: 89
End: 2024-01-29
Payer: MEDICARE

## 2024-01-29 DIAGNOSIS — E11.9 TYPE 2 DIABETES MELLITUS WITHOUT COMPLICATION, WITHOUT LONG-TERM CURRENT USE OF INSULIN: Primary | ICD-10-CM

## 2024-01-29 DIAGNOSIS — F32.A DEPRESSION, UNSPECIFIED DEPRESSION TYPE: ICD-10-CM

## 2024-01-29 NOTE — OUTREACH NOTE
AMBULATORY CASE MANAGEMENT NOTE    Name and Relationship of Patient/Support Person: Melanie Fournier - Emergency Contact    CCM Interim Update    Spoke to patient's wife regarding patient's care. Wife reports patient is doing well. Diabetes is managed. His blood sugar ranges from 100 to 180. Patient continues to take medication as prescribed and incorporate healthy diet. Discussed hypertension management. Wife reports bp remains in normal range with pulse in 90's. No further needs. Will place patient in monitoring due to meeting goals.         Education Documentation  No documentation found.        Toyin CASTELLANO  Ambulatory Case Management    1/29/2024, 16:19 EST

## 2024-02-05 ENCOUNTER — OFFICE VISIT (OUTPATIENT)
Dept: CARDIOLOGY | Facility: CLINIC | Age: 89
End: 2024-02-05
Payer: MEDICARE

## 2024-02-05 ENCOUNTER — PATIENT OUTREACH (OUTPATIENT)
Dept: CASE MANAGEMENT | Facility: OTHER | Age: 89
End: 2024-02-05
Payer: MEDICARE

## 2024-02-05 VITALS
DIASTOLIC BLOOD PRESSURE: 74 MMHG | WEIGHT: 246 LBS | OXYGEN SATURATION: 98 % | HEART RATE: 79 BPM | BODY MASS INDEX: 31.57 KG/M2 | SYSTOLIC BLOOD PRESSURE: 120 MMHG | HEIGHT: 74 IN

## 2024-02-05 DIAGNOSIS — R07.2 PRECORDIAL PAIN: ICD-10-CM

## 2024-02-05 DIAGNOSIS — I10 BENIGN ESSENTIAL HTN: ICD-10-CM

## 2024-02-05 DIAGNOSIS — E78.5 DYSLIPIDEMIA: ICD-10-CM

## 2024-02-05 DIAGNOSIS — I44.0 FIRST DEGREE ATRIOVENTRICULAR BLOCK: ICD-10-CM

## 2024-02-05 DIAGNOSIS — I25.10 CORONARY ARTERY DISEASE INVOLVING NATIVE CORONARY ARTERY OF NATIVE HEART WITHOUT ANGINA PECTORIS: Primary | ICD-10-CM

## 2024-02-05 DIAGNOSIS — I48.0 PAROXYSMAL ATRIAL FIBRILLATION: ICD-10-CM

## 2024-02-05 DIAGNOSIS — E11.9 TYPE 2 DIABETES MELLITUS WITHOUT COMPLICATION, WITHOUT LONG-TERM CURRENT USE OF INSULIN: Primary | ICD-10-CM

## 2024-02-05 DIAGNOSIS — F32.A DEPRESSION, UNSPECIFIED DEPRESSION TYPE: ICD-10-CM

## 2024-02-05 PROCEDURE — 99214 OFFICE O/P EST MOD 30 MIN: CPT | Performed by: INTERNAL MEDICINE

## 2024-02-05 RX ORDER — FUROSEMIDE 20 MG/1
20 TABLET ORAL DAILY
COMMUNITY

## 2024-02-05 NOTE — OUTREACH NOTE
AMBULATORY CASE MANAGEMENT NOTE    Name and Relationship of Patient/Support Person: EspinozachataesmerStephanie - Emergency Contact    CCM Interim Update    Daughter called ACM to discuss need for Jardiance assistance program as it has been a year since last submission. Patient states she printed off assistance program and will bring to clinic. Explained that PCP will complete prescriber portion and office staff will fax. Provided samples for patient to  at front off in the meantime.     Education Documentation  No documentation found.        Toyin CASTELLANO  Ambulatory Case Management    2/5/2024, 08:49 EST

## 2024-02-05 NOTE — PROGRESS NOTES
OFFICE VISIT  NOTE  [facility] CARDIOLOGY      Name: Arie Fournier    Date: 2024  MRN:  [id number]  :  1935      REFERRING/PRIMARY PROVIDER:  Mike Bhardwaj MD    Chief Complaint   Patient presents with    Coronary Artery Disease       HPI: Arie Fournier is a 88 y.o. male who presents today for CAD, PAF, hypertension, hyperlipidemia.  Previous patient of Dr. Fatima, transferring to [facility] for convenience.  Cath  in [place], mild 30% LAD and RCA.  EF 60% history of myasthenia gravis as well.  History of PAF on apixaban 2.5 mg last echo 2023 EF 51% RVSP normal mild AI.  Reports chest pain, substernal, right-sided, occurs at rest and exertion.  Also has fatigue and muscle weakness doing any activity is getting worse over the last 2 to 3 months.    Past Medical History:   Diagnosis Date    Anxiety     Benign essential hypertension     Benign prostatic hyperplasia     WITH URINARY FREQUENCY    Benign prostatic hypertrophy     Coronary artery disease     Depressive disorder     DJD (degenerative joint disease)     INVOLVING MULTIPLE JOINTS    Dyslipidemia     In 2016:  Total cholesterol 137, triglycerides 180, HDL 38, LDL 63.    First degree atrioventricular block     Hyperglycemia     Injury from a fall in      Mixed hyperlipidemia due to type 2 diabetes mellitus     Myasthenia gravis     Obesity     Obstructive sleep apnea with C-PAP use.     Osteoarthritis     Primary osteoarthritis involving multiple joints     Recurrent major depressive episodes, moderate     Type 2 diabetes mellitus     WITHOUT HYPERGLYCEMIA WITHOUT LONG TERM CURRENT USE OF INSULIN    Type 2 diabetes, onset .  In 2016, A1c was 6.5.        Past Surgical History:   Procedure Laterality Date    CYST REMOVAL      BACK    HAND SURGERY      TONSILLECTOMY      TUMOR EXCISION      skin excision for tumor       Social History     Socioeconomic History    Marital status:     Tobacco Use    Smoking status: Never     Passive exposure: Never    Smokeless tobacco: Never   Vaping Use    Vaping Use: Never used   Substance and Sexual Activity    Alcohol use: Not Currently    Drug use: Never    Sexual activity: Defer       Family History   Problem Relation Age of Onset    Stroke Mother     Hypertension Mother     Stroke Father     Hypertension Father     Diabetes Brother     Hypertension Brother     Prostate cancer Brother     Hyperlipidemia Brother         ROS:   Constitutional no fever,  no weight loss   Skin no rash, no subcutaneous nodules   Otolaryngeal no difficulty swallowing   Cardiovascular See HPI   Pulmonary no cough, no sputum production   Gastrointestinal no constipation, no diarrhea   Genitourinary no dysuria, no hematuria   Hematologic no easy bruisability, no abnormal bleeding   Musculoskeletal no muscle pain   Neurologic no dizziness, no falls         Allergies   Allergen Reactions    Other Unknown - Low Severity     Horse Serum    Tetanus Immune Globulin Unknown - Low Severity         Current Outpatient Medications:     acetaminophen (TYLENOL) 325 MG tablet, Take 2 tablets by mouth As Needed for Mild Pain., Disp: , Rfl:     apixaban (ELIQUIS) 2.5 MG tablet tablet, Take 1 tablet by mouth 2 (Two) Times a Day., Disp: 60 tablet, Rfl: 11    ascorbic acid (VITAMIN C) 1000 MG tablet, Take 1 tablet by mouth Daily., Disp: , Rfl:     cetirizine (ZyrTEC) 10 MG tablet, Take 1 tablet by mouth Daily., Disp: , Rfl:     coenzyme Q10 100 MG capsule, Take 2 capsules by mouth Daily., Disp: , Rfl:     empagliflozin (JARDIANCE) 25 MG tablet tablet, Take 1 tablet by mouth Daily., Disp: , Rfl:     finasteride (PROSCAR) 5 MG tablet, Take 1 tablet by mouth Daily., Disp: 90 tablet, Rfl: 1    furosemide (LASIX) 20 MG tablet, Take 1 tablet by mouth Daily., Disp: , Rfl:     ipratropium (ATROVENT) 0.06 % nasal spray, 2 sprays into the nostril(s) as directed by provider 4 (Four) Times a Day.,  "Disp: 15 mL, Rfl: 11    Loratadine 10 MG capsule, Take 1 capsule by mouth Daily., Disp: , Rfl:     metFORMIN (GLUCOPHAGE) 1000 MG tablet, TAKE 1 TABLET BY MOUTH TWICE DAILY WITH  MORNING  AND  EVENING  MEAL, Disp: 180 tablet, Rfl: 0    methylcellulose, Laxative, (CITRUCEL) 500 MG tablet tablet, Take 2 tablets by mouth As Needed., Disp: , Rfl:     metoprolol tartrate (LOPRESSOR) 25 MG tablet, Take 1/2 (one-half) tablet by mouth twice daily, Disp: 90 tablet, Rfl: 0    Multiple Vitamin (MULTI VITAMIN PO), Take  by mouth Daily., Disp: , Rfl:     potassium chloride (K-DUR,KLOR-CON) 20 MEQ CR tablet, Take 1 tablet by mouth once daily, Disp: 90 tablet, Rfl: 0    predniSONE (DELTASONE) 10 MG tablet, Take 1 tablet by mouth Daily., Disp: 30 tablet, Rfl: 11    pyridostigmine (MESTINON) 60 MG tablet, Take 1 tablet by mouth 3 (Three) Times a Day., Disp: 90 tablet, Rfl: 2    sucralfate (CARAFATE) 1 g tablet, Take 1 tablet by mouth 2 (Two) Times a Day With Meals., Disp: 60 tablet, Rfl: 2    tamsulosin (FLOMAX) 0.4 MG capsule 24 hr capsule, Take 1 capsule by mouth Daily., Disp: 90 capsule, Rfl: 1    venlafaxine XR (EFFEXOR-XR) 150 MG 24 hr capsule, Take 1 capsule by mouth Daily., Disp: 30 capsule, Rfl: 5    Vitals:    02/05/24 1022   BP: 120/74   BP Location: Left arm   Patient Position: Sitting   Pulse: 79   SpO2: 98%   Weight: 112 kg (246 lb)   Height: 188 cm (74\")     Body mass index is 31.58 kg/m².    PHYSICAL EXAM:    General Appearance:   well developed  well nourished  HENT:   oropharynx moist  lips not cyanotic  Neck:  thyroid not enlarged  supple  Respiratory:  no respiratory distress  normal breath sounds  no rales  Cardiovascular:  no jugular venous distention  regular rhythm  apical impulse normal  S1 normal, S2 normal  no S3, no S4   no murmur  no rub, no thrill  carotid pulses normal; no bruit  lower extremity edema: none      Musculoskeletal:  no clubbing of fingers.   normocephalic, head atraumatic  Skin:   warm, " "dry  Psychiatric:  judgement and insight appropriate  normal mood and affect    RESULTS:   Procedures    Results for orders placed during the hospital encounter of 04/26/23    Adult Transthoracic Echo Complete W/ Cont if Necessary Per Protocol    Interpretation Summary    Left ventricular systolic function is normal. Left ventricular ejection fraction appears to be 51 - 55%.    Left ventricular diastolic function was indeterminate.    Estimated right ventricular systolic pressure from tricuspid regurgitation is normal (<35 mmHg).        Labs:  Lab Results   Component Value Date    TRIG 74 11/17/2022    HDL 49 11/17/2022    LDL 59 11/17/2022    AST 16 07/23/2023    ALT 16 07/23/2023     Lab Results   Component Value Date    HGBA1C 6.7 (H) 11/17/2022     No components found for: \"CREATINININE\"  No results found for: \"EGFRIFNONA\"    Most recent PCP note, imaging tests, and labs reviewed.    ASSESSMENT:  Problem List Items Addressed This Visit       Coronary artery disease - Primary    Overview     In 2002, left heart catheterization with minimal diffuse disease in the right coronary artery and minimal stenosis of 30% to the proximal LAD.  Left ventricular ejection fraction 50%.  Mild elevation of left ventricular end diastolic pressure, at [place], [place], per Chad Montes De Oca.    DIAGNOSTIC DATA:      April 22, 2016:  EKG:  Sinus rhythm with first-degree AV block, otherwise normal EKG with a ventricular rate of 60 beats per minute.  MN interval of 260 msec., QRS duration of 92 msec., QTC of 414 msec.        March of 2016:  Total cholesterol of 137, triglycerides of 180, HDL 38, LDL of 63.   Hemoglobin A1c of 6.5.    Sodium 139, potassium 4.3, chloride 101, CO2 of 25, BUN of 21, creatinine of 0.6, glucose of 113.   TSH of 1.5.   WBC of 6.3, hemoglobin of 15, hematocrit of 47, platelet count of 212,000.            Relevant Orders    Stress Test With Myocardial Perfusion One Day    Adult " Transthoracic Echo Complete w/ Color, Spectral and Contrast if necessary per protocol    Dyslipidemia    Overview     In March of 2016:  Total cholesterol 137, triglycerides 180, HDL 38, LDL 63.           First degree atrioventricular block    Benign essential HTN    Relevant Medications    furosemide (LASIX) 20 MG tablet    Paroxysmal atrial fibrillation     Other Visit Diagnoses       Precordial pain        Relevant Orders    Stress Test With Myocardial Perfusion One Day    Adult Transthoracic Echo Complete w/ Color, Spectral and Contrast if necessary per protocol            PLAN:    1.  Chest pain:  Given history of nonobstructive CAD we will proceed with Lexiscan nuclear stress test to rule out obstructive coronary disease.  Check echo as well    2.  CAD:  Imaging as above    3.  Hyperlipidemia:  Stop atorvastatin due to muscle weakness    4.  PAF:  Continue Eliquis for stroke prevention    5.  Hypertension:  Well-controlled on current regimen, continue for now.    Advance Care Planning   ACP discussion was held with the patient during this visit. Patient has an advance directive (not in EMR), copy requested.         Return to clinic in 6 months, or sooner as needed.    Thank you for the opportunity to share in the care of your patient; please do not hesitate to call me with any questions.     Jonn Dickens MD, FACC, [facility]  Office: [phone] [address] [place] [address]    02/05/24

## 2024-02-16 ENCOUNTER — PATIENT OUTREACH (OUTPATIENT)
Dept: CASE MANAGEMENT | Facility: OTHER | Age: 89
End: 2024-02-16
Payer: MEDICARE

## 2024-02-16 DIAGNOSIS — F32.A DEPRESSION, UNSPECIFIED DEPRESSION TYPE: ICD-10-CM

## 2024-02-16 DIAGNOSIS — E11.9 TYPE 2 DIABETES MELLITUS WITHOUT COMPLICATION, WITHOUT LONG-TERM CURRENT USE OF INSULIN: Primary | ICD-10-CM

## 2024-02-24 DIAGNOSIS — F32.A DEPRESSION, UNSPECIFIED DEPRESSION TYPE: ICD-10-CM

## 2024-02-25 ENCOUNTER — PATIENT MESSAGE (OUTPATIENT)
Dept: FAMILY MEDICINE CLINIC | Facility: CLINIC | Age: 89
End: 2024-02-25
Payer: MEDICARE

## 2024-02-26 RX ORDER — VENLAFAXINE HYDROCHLORIDE 150 MG/1
150 CAPSULE, EXTENDED RELEASE ORAL DAILY
Qty: 30 CAPSULE | Refills: 2 | Status: SHIPPED | OUTPATIENT
Start: 2024-02-26

## 2024-02-27 ENCOUNTER — TELEPHONE (OUTPATIENT)
Dept: FAMILY MEDICINE CLINIC | Facility: CLINIC | Age: 89
End: 2024-02-27
Payer: MEDICARE

## 2024-02-27 DIAGNOSIS — E11.65 TYPE 2 DIABETES MELLITUS WITH HYPERGLYCEMIA, WITHOUT LONG-TERM CURRENT USE OF INSULIN: ICD-10-CM

## 2024-02-27 NOTE — TELEPHONE ENCOUNTER
Patient came by and is asking for a paper prescription of the Jardiance 10 mg. He states that the drug store is asking

## 2024-03-02 NOTE — TELEPHONE ENCOUNTER
From: Arie Fournier  To: Mike Bhardwaj  Sent: 2/25/2024 6:40 PM EST  Subject: Prescription for Jardiance    Hello Dr. Bhardwaj, this is Arie Rothmanesmer's daughter. We are trying to get him prescription assistance for his Jardiance. I know your office filled out paperwork for this, but the assistance program is now wanting to know how much he would have to pay out-of -pocket with his new Part D insurance. The pharmacy will not give him that information without a prescription. Can you send over a 30 day prescription to [facility] pharmacy so we know how much he would have to pay? Thank you!   -Stephanie Forunier

## 2024-03-06 RX ORDER — PYRIDOSTIGMINE BROMIDE 60 MG/1
60 TABLET ORAL 3 TIMES DAILY
Qty: 90 TABLET | Refills: 0 | OUTPATIENT
Start: 2024-03-06

## 2024-03-06 RX ORDER — PYRIDOSTIGMINE BROMIDE 60 MG/1
60 TABLET ORAL 3 TIMES DAILY
Qty: 90 TABLET | Refills: 0 | Status: SHIPPED | OUTPATIENT
Start: 2024-03-06

## 2024-03-06 NOTE — TELEPHONE ENCOUNTER
Patient came in wanting samples of jardiance 25mg, I didn't see any documentation for that dose, only jardiance 10mg.  Patient said he thought there was some confusion. I checked with Dr Bhardwaj and he said he couldn't find any documentation on the 25 mg dose either that he believed it was started by the cardiologist.  He said that the patient was taking it to protect his heart and that would be the 10mg dose and to give him samples of that, I explained this to the patient and gave him samples of the jardiance 10mg dose.

## 2024-03-08 ENCOUNTER — TELEPHONE (OUTPATIENT)
Dept: CARDIOLOGY | Facility: CLINIC | Age: 89
End: 2024-03-08
Payer: MEDICARE

## 2024-03-08 NOTE — TELEPHONE ENCOUNTER
Faxed [employer] paperwork to [facility]- [phone] they are the company handing pt assistance.

## 2024-03-13 ENCOUNTER — OFFICE VISIT (OUTPATIENT)
Dept: FAMILY MEDICINE CLINIC | Facility: CLINIC | Age: 89
End: 2024-03-13
Payer: MEDICARE

## 2024-03-13 VITALS
OXYGEN SATURATION: 94 % | HEART RATE: 62 BPM | SYSTOLIC BLOOD PRESSURE: 144 MMHG | BODY MASS INDEX: 32.98 KG/M2 | WEIGHT: 257 LBS | HEIGHT: 74 IN | DIASTOLIC BLOOD PRESSURE: 80 MMHG

## 2024-03-13 DIAGNOSIS — I10 BENIGN ESSENTIAL HTN: ICD-10-CM

## 2024-03-13 DIAGNOSIS — R53.83 OTHER FATIGUE: Primary | ICD-10-CM

## 2024-03-13 DIAGNOSIS — M25.50 ARTHRALGIA, UNSPECIFIED JOINT: ICD-10-CM

## 2024-03-13 DIAGNOSIS — E11.65 TYPE 2 DIABETES MELLITUS WITH HYPERGLYCEMIA, WITHOUT LONG-TERM CURRENT USE OF INSULIN: ICD-10-CM

## 2024-03-13 DIAGNOSIS — G47.33 OBSTRUCTIVE SLEEP APNEA: ICD-10-CM

## 2024-03-13 NOTE — PROGRESS NOTES
Follow Up Office Visit      Date of Visit:  2024   Patient Name: Arie Fournier  : 1935   MRN: [id number]     Chief Complaint:    Chief Complaint   Patient presents with   • Doesn't feel well        History of Present Illness: rAie Fournier is a 89 y.o. male who is here today for follow up.  Patient presents with general malaise.  Over the past few years it is very common that he will have episodes where he just not feeling well.  Recent evaluation through cardiology showed a completely negative stress test and echocardiogram.  We have not recently checked blood work for patient.  He has multiple medical conditions which require medication such as hypertension and diabetes.  He has had some arthralgias which are somewhat worse.  He also does use a CPAP machine.  There is not been a proper interrogation to see if he is getting full functionality and use out of this.        Subjective      Review of Systems:   Review of Systems   Constitutional:  Positive for fatigue. Negative for fever.   HENT:  Negative for congestion and ear pain.    Respiratory:  Negative for apnea, cough, chest tightness and shortness of breath.    Cardiovascular:  Negative for chest pain.   Gastrointestinal:  Negative for abdominal pain, constipation, diarrhea and nausea.   Musculoskeletal:  Negative for arthralgias.   Psychiatric/Behavioral:  Negative for depressed mood and stress.        Past Medical History:   Past Medical History:   Diagnosis Date   • Anxiety    • Benign essential hypertension    • Benign prostatic hyperplasia     WITH URINARY FREQUENCY   • Benign prostatic hypertrophy    • Coronary artery disease    • Depressive disorder    • DJD (degenerative joint disease)     INVOLVING MULTIPLE JOINTS   • Dyslipidemia     In 2016:  Total cholesterol 137, triglycerides 180, HDL 38, LDL 63.   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    • Osteoarthritis    • Primary osteoarthritis involving multiple joints    • Recurrent major depressive episodes, moderate    • Type 2 diabetes mellitus     WITHOUT HYPERGLYCEMIA WITHOUT LONG TERM CURRENT USE OF INSULIN   • Type 2 diabetes, onset 2001.  In 2016, A1c was 6.5.        Past Surgical History:   Past Surgical History:   Procedure Laterality Date   • CYST REMOVAL  1976    BACK   • HAND SURGERY     • TONSILLECTOMY     • TUMOR EXCISION      skin excision for tumor       Family History:   Family History   Problem Relation Age of Onset   • Stroke Mother    • Hypertension Mother    • Stroke Father    • Hypertension Father    • Diabetes Brother    • Hypertension Brother    • Prostate cancer Brother    • Hyperlipidemia Brother        Social History:   Social History     Socioeconomic History   • Marital status:    Tobacco Use   • Smoking status: Never     Passive exposure: Never   • Smokeless tobacco: Never   Vaping Use   • Vaping status: Never Used   Substance and Sexual Activity   • Alcohol use: Not Currently   • Drug use: Never   • Sexual activity: Defer       Medications:     Current Outpatient Medications:   •  acetaminophen (TYLENOL) 325 MG tablet, Take 2 tablets by mouth As Needed for Mild Pain., Disp: , Rfl:   •  apixaban (ELIQUIS) 2.5 MG tablet tablet, Take 1 tablet by mouth 2 (Two) Times a Day., Disp: 60 tablet, Rfl: 11  •  ascorbic acid (VITAMIN C) 1000 MG tablet, Take 1 tablet by mouth Daily., Disp: , Rfl:   •  cetirizine (ZyrTEC) 10 MG tablet, Take 1 tablet by mouth Daily., Disp: , Rfl:   •  coenzyme Q10 100 MG capsule, Take 2 capsules by mouth Daily., Disp: , Rfl:   •  empagliflozin (JARDIANCE) 10 MG tablet tablet, Take 1 tablet by mouth Daily., Disp: 90 tablet, Rfl: 1  •  finasteride (PROSCAR) 5 MG tablet, Take 1 tablet by mouth Daily., Disp: 90 tablet, Rfl: 1  •   "furosemide (LASIX) 20 MG tablet, Take 1 tablet by mouth Daily., Disp: , Rfl:   •  ipratropium (ATROVENT) 0.06 % nasal spray, 2 sprays into the nostril(s) as directed by provider 4 (Four) Times a Day., Disp: 15 mL, Rfl: 11  •  Loratadine 10 MG capsule, Take 1 capsule by mouth Daily., Disp: , Rfl:   •  metFORMIN (GLUCOPHAGE) 1000 MG tablet, TAKE 1 TABLET BY MOUTH TWICE DAILY WITH  MORNING  AND  EVENING  MEAL, Disp: 180 tablet, Rfl: 0  •  methylcellulose, Laxative, (CITRUCEL) 500 MG tablet tablet, Take 2 tablets by mouth As Needed., Disp: , Rfl:   •  metoprolol tartrate (LOPRESSOR) 25 MG tablet, Take 1/2 (one-half) tablet by mouth twice daily, Disp: 90 tablet, Rfl: 0  •  Multiple Vitamin (MULTI VITAMIN PO), Take  by mouth Daily., Disp: , Rfl:   •  potassium chloride (K-DUR,KLOR-CON) 20 MEQ CR tablet, Take 1 tablet by mouth once daily, Disp: 90 tablet, Rfl: 0  •  predniSONE (DELTASONE) 10 MG tablet, Take 1 tablet by mouth Daily., Disp: 30 tablet, Rfl: 11  •  pyridostigmine (MESTINON) 60 MG tablet, TAKE 1 TABLET BY MOUTH THREE TIMES DAILY, Disp: 90 tablet, Rfl: 0  •  sucralfate (CARAFATE) 1 g tablet, Take 1 tablet by mouth 2 (Two) Times a Day With Meals., Disp: 60 tablet, Rfl: 2  •  tamsulosin (FLOMAX) 0.4 MG capsule 24 hr capsule, Take 1 capsule by mouth Daily., Disp: 90 capsule, Rfl: 1  •  venlafaxine XR (EFFEXOR-XR) 150 MG 24 hr capsule, Take 1 capsule by mouth once daily, Disp: 30 capsule, Rfl: 2    Allergies:   Allergies   Allergen Reactions   • Other Unknown - Low Severity     Horse Serum   • Tetanus Immune Globulin Unknown - Low Severity       Objective     Physical Exam:  Vital Signs:   Vitals:    03/13/24 1124   BP: 144/80   Pulse: 62   SpO2: 94%   Weight: 117 kg (257 lb)   Height: 188 cm (74\")     Body mass index is 33 kg/m².     Physical Exam  Vitals and nursing note reviewed.   Constitutional:       General: He is not in acute distress.     Appearance: Normal appearance. He is not ill-appearing.   HENT:      " Head: Normocephalic and atraumatic.      Right Ear: Tympanic membrane and ear canal normal.      Left Ear: Tympanic membrane and ear canal normal.      Nose: Nose normal.   Cardiovascular:      Rate and Rhythm: Normal rate and regular rhythm.      Heart sounds: Normal heart sounds.   Pulmonary:      Effort: Pulmonary effort is normal.      Breath sounds: Normal breath sounds.   Neurological:      Mental Status: He is alert and oriented to person, place, and time. Mental status is at baseline.   Psychiatric:         Mood and Affect: Mood normal.         Procedures      Assessment / Plan      Assessment/Plan:   Diagnoses and all orders for this visit:    1. Other fatigue (Primary)  -     Vitamin B12    2. Type 2 diabetes mellitus with hyperglycemia, without long-term current use of insulin  -     Hemoglobin A1c    3. Benign essential HTN  -     CBC Auto Differential  -     Comprehensive Metabolic Panel  -     Lipid Panel  -     TSH    4. Arthralgia, unspecified joint  -     Sedimentation rate, automated  -     C-reactive protein  -     NASIM  -     Cyclic Citrul Peptide Antibody, IgG / IgA  -     RHEUMATOID FACTOR    5. Obstructive sleep apnea with C-PAP use.  -     Ambulatory Referral to ENT (Otolaryngology)         Checking appropriate blood work due to his current medical conditions.  Will also have appointment with ENT to evaluate the effect of his CPAP machine.  We may need to also consider some medication for insomnia pending what they say about his CPAP machine.    Follow Up:   No follow-ups on file.    Mike Bhardwaj  [facility] [place]

## 2024-03-14 LAB
ALBUMIN SERPL-MCNC: 4.5 G/DL (ref 3.7–4.7)
ALBUMIN/GLOB SERPL: 2 {RATIO} (ref 1.2–2.2)
ALP SERPL-CCNC: 79 IU/L (ref 44–121)
ALT SERPL-CCNC: 27 IU/L (ref 0–44)
ANA SER QL: NEGATIVE
AST SERPL-CCNC: 24 IU/L (ref 0–40)
BASOPHILS # BLD AUTO: 0 X10E3/UL (ref 0–0.2)
BASOPHILS NFR BLD AUTO: 0 %
BILIRUB SERPL-MCNC: 0.7 MG/DL (ref 0–1.2)
BUN SERPL-MCNC: 25 MG/DL (ref 8–27)
BUN/CREAT SERPL: 28 (ref 10–24)
CALCIUM SERPL-MCNC: 9.5 MG/DL (ref 8.6–10.2)
CCP IGA+IGG SERPL IA-ACNC: 2 UNITS (ref 0–19)
CHLORIDE SERPL-SCNC: 98 MMOL/L (ref 96–106)
CHOLEST SERPL-MCNC: 222 MG/DL (ref 100–199)
CO2 SERPL-SCNC: 25 MMOL/L (ref 20–29)
CREAT SERPL-MCNC: 0.89 MG/DL (ref 0.76–1.27)
CRP SERPL-MCNC: <1 MG/L (ref 0–10)
EGFRCR SERPLBLD CKD-EPI 2021: 82 ML/MIN/1.73
EOSINOPHIL # BLD AUTO: 0.2 X10E3/UL (ref 0–0.4)
EOSINOPHIL NFR BLD AUTO: 3 %
ERYTHROCYTE [DISTWIDTH] IN BLOOD BY AUTOMATED COUNT: 13.2 % (ref 11.6–15.4)
ERYTHROCYTE [SEDIMENTATION RATE] IN BLOOD BY WESTERGREN METHOD: 2 MM/HR (ref 0–30)
GLOBULIN SER CALC-MCNC: 2.3 G/DL (ref 1.5–4.5)
GLUCOSE SERPL-MCNC: 213 MG/DL (ref 70–99)
HBA1C MFR BLD: 8.7 % (ref 4.8–5.6)
HCT VFR BLD AUTO: 52 % (ref 37.5–51)
HDLC SERPL-MCNC: 52 MG/DL
HGB BLD-MCNC: 17.2 G/DL (ref 13–17.7)
IMM GRANULOCYTES # BLD AUTO: 0 X10E3/UL (ref 0–0.1)
IMM GRANULOCYTES NFR BLD AUTO: 1 %
LDLC SERPL CALC-MCNC: 121 MG/DL (ref 0–99)
LYMPHOCYTES # BLD AUTO: 1.5 X10E3/UL (ref 0.7–3.1)
LYMPHOCYTES NFR BLD AUTO: 19 %
MCH RBC QN AUTO: 30.7 PG (ref 26.6–33)
MCHC RBC AUTO-ENTMCNC: 33.1 G/DL (ref 31.5–35.7)
MCV RBC AUTO: 93 FL (ref 79–97)
MONOCYTES # BLD AUTO: 0.6 X10E3/UL (ref 0.1–0.9)
MONOCYTES NFR BLD AUTO: 8 %
NEUTROPHILS # BLD AUTO: 5.6 X10E3/UL (ref 1.4–7)
NEUTROPHILS NFR BLD AUTO: 69 %
PLATELET # BLD AUTO: 210 X10E3/UL (ref 150–450)
POTASSIUM SERPL-SCNC: 4.1 MMOL/L (ref 3.5–5.2)
PROT SERPL-MCNC: 6.8 G/DL (ref 6–8.5)
RBC # BLD AUTO: 5.6 X10E6/UL (ref 4.14–5.8)
RHEUMATOID FACT SERPL-ACNC: <10 IU/ML
SODIUM SERPL-SCNC: 142 MMOL/L (ref 134–144)
TRIGL SERPL-MCNC: 279 MG/DL (ref 0–149)
TSH SERPL DL<=0.005 MIU/L-ACNC: 2.73 UIU/ML (ref 0.45–4.5)
VIT B12 SERPL-MCNC: 503 PG/ML (ref 232–1245)
VLDLC SERPL CALC-MCNC: 49 MG/DL (ref 5–40)
WBC # BLD AUTO: 8 X10E3/UL (ref 3.4–10.8)

## 2024-03-18 RX ORDER — IPRATROPIUM BROMIDE 42 UG/1
SPRAY, METERED NASAL
Qty: 15 ML | Refills: 0 | Status: SHIPPED | OUTPATIENT
Start: 2024-03-18

## 2024-03-28 ENCOUNTER — OFFICE VISIT (OUTPATIENT)
Dept: NEUROLOGY | Facility: CLINIC | Age: 89
End: 2024-03-28
Payer: MEDICARE

## 2024-03-28 VITALS
BODY MASS INDEX: 33.1 KG/M2 | SYSTOLIC BLOOD PRESSURE: 128 MMHG | HEART RATE: 88 BPM | DIASTOLIC BLOOD PRESSURE: 70 MMHG | OXYGEN SATURATION: 95 % | WEIGHT: 257.94 LBS | HEIGHT: 74 IN

## 2024-03-28 DIAGNOSIS — G70.00 MYASTHENIA GRAVIS WITHOUT EXACERBATION: Primary | Chronic | ICD-10-CM

## 2024-03-28 PROCEDURE — 1159F MED LIST DOCD IN RCRD: CPT | Performed by: PSYCHIATRY & NEUROLOGY

## 2024-03-28 PROCEDURE — 1160F RVW MEDS BY RX/DR IN RCRD: CPT | Performed by: PSYCHIATRY & NEUROLOGY

## 2024-03-28 PROCEDURE — 99213 OFFICE O/P EST LOW 20 MIN: CPT | Performed by: PSYCHIATRY & NEUROLOGY

## 2024-03-28 RX ORDER — PYRIDOSTIGMINE BROMIDE 60 MG/1
60 TABLET ORAL 3 TIMES DAILY
Qty: 90 TABLET | Refills: 11 | Status: SHIPPED | OUTPATIENT
Start: 2024-03-28

## 2024-03-28 RX ORDER — PREDNISONE 10 MG/1
10 TABLET ORAL DAILY
Qty: 90 TABLET | Refills: 3 | Status: SHIPPED | OUTPATIENT
Start: 2024-03-28 | End: 2025-03-28

## 2024-03-28 NOTE — PROGRESS NOTES
"Chief Complaint    Myasthenia gravis without exacerbation    Subjective        Arie Fournier presents to [facility] NEUROLOGY  History of Present Illness    89 y.o. male returns in follow up.  Last visit on 11/29/23 continued Prednisone and mestinon.       Feels weak in the morning.      Decreased mestinon but vision problems worsened.      Proximal LE weakness.       No longer having issues clearing secretions.  Proximal arm and legs giving out.        Diplopia is rare.       Denies choking, trouble chewing.       SOB exertion.       Always uses arms to get out of chair.  Rests arms when shaving.      Mestinon improving sx.      MG ADL 5     Reviewed medical records:     Onset of Diplopia 7/4/23.       MRI Brain, my review of films, 7/4/23 moderate atrophy.       MUSK negative     AChR binding 6.3, blocking 55, modulating 57     Objective   Vital Signs:  /70   Pulse 88   Ht 188 cm (74.02\")   Wt 117 kg (257 lb 15 oz)   SpO2 95%   BMI 33.10 kg/m²   Estimated body mass index is 33.1 kg/m² as calculated from the following:    Height as of this encounter: 188 cm (74.02\").    Weight as of this encounter: 117 kg (257 lb 15 oz).           Neurologic Exam     Mental Status   Oriented to person, place, and time.   Speech: speech is normal   Level of consciousness: alert  Knowledge: good and consistent with education.   Normal comprehension.     Cranial Nerves   Cranial nerves II through XII intact.     CN II   Visual fields full to confrontation.   Visual acuity: normal  Right visual field deficit: none  Left visual field deficit: none     CN III, IV, VI   Pupils are equal, round, and reactive to light.  Extraocular motions are normal.   Nystagmus: none   Diplopia: none  Ophthalmoparesis: none  Upgaze: normal  Downgaze: normal  Conjugate gaze: present    CN V   Facial sensation intact.   Right corneal reflex: normal  Left corneal reflex: normal    CN VII   Right facial weakness: none  Left " facial weakness: none    CN VIII   Hearing: intact    CN IX, X   Palate: symmetric  Right gag reflex: normal  Left gag reflex: normal    CN XI   Right sternocleidomastoid strength: normal  Left sternocleidomastoid strength: normal    CN XII   Tongue: not atrophic  Fasciculations: absent  Tongue deviation: none    Motor Exam   Muscle bulk: normal  Overall muscle tone: normal    Strength   Strength 5/5 throughout.     Sensory Exam   Light touch normal.     Gait, Coordination, and Reflexes     Gait  Gait: normal    Tremor   Resting tremor: absent  Intention tremor: absent  Action tremor: absent    Reflexes   Reflexes 2+ except as noted.        Physical Exam  Eyes:      Extraocular Movements: EOM normal.      Pupils: Pupils are equal, round, and reactive to light.   Neurological:      Mental Status: He is oriented to person, place, and time.      Cranial Nerves: Cranial nerves 2-12 are intact.      Motor: Motor strength is normal.     Gait: Gait is intact.   Psychiatric:         Speech: Speech normal.        Result Review :    The following data was reviewed by: Arie Lester MD on 03/28/2024:  Common labs          7/4/2023    12:05 7/23/2023    13:27 3/13/2024    12:08   Common Labs   Glucose 175  140  213    BUN 20  19  25    Creatinine 0.93  0.86  0.89    Sodium 138  133  142    Potassium 4.5  4.1  4.1    Chloride 98  95  98    Calcium 9.7  8.9  9.5    Total Protein   6.8    Albumin 4.1  3.9  4.5    Total Bilirubin 0.7  0.5  0.7    Alkaline Phosphatase 69  58  79    AST (SGOT) 24  16  24    ALT (SGPT) 22  16  27    WBC 7.57  6.75  8.0    Hemoglobin 16.1  14.1  17.2    Hematocrit 50.4  43.8  52.0    Platelets 215  181  210    Total Cholesterol   222    Triglycerides   279    HDL Cholesterol   52    LDL Cholesterol    121    Hemoglobin A1C   8.7                   Assessment and Plan     Diagnoses and all orders for this visit:    1. Myasthenia gravis without exacerbation (Primary)  Assessment & Plan:  Continue  prednisone 10 mg daily and mestinon       Other orders  -     pyridostigmine (MESTINON) 60 MG tablet; Take 1 tablet by mouth 3 (Three) Times a Day.  Dispense: 90 tablet; Refill: 11  -     predniSONE (DELTASONE) 10 MG tablet; Take 1 tablet by mouth Daily.  Dispense: 90 tablet; Refill: 3             Follow Up     No follow-ups on file.  Patient was given instructions and counseling regarding his condition or for health maintenance advice. Please see specific information pulled into the AVS if appropriate.

## 2024-03-29 ENCOUNTER — PATIENT OUTREACH (OUTPATIENT)
Dept: CASE MANAGEMENT | Facility: OTHER | Age: 89
End: 2024-03-29
Payer: MEDICARE

## 2024-03-29 DIAGNOSIS — F32.A DEPRESSION, UNSPECIFIED DEPRESSION TYPE: ICD-10-CM

## 2024-03-29 DIAGNOSIS — E11.9 TYPE 2 DIABETES MELLITUS WITHOUT COMPLICATION, WITHOUT LONG-TERM CURRENT USE OF INSULIN: Primary | ICD-10-CM

## 2024-03-29 NOTE — OUTREACH NOTE
[facility] End of Month Documentation    This Chronic Medical Management Care Plan for Arie Fournier, 89 y.o. male, has been monitored and managed; reviewed and a new plan of care implemented for the month of March.  A cumulative time of 24  minutes was spent on this patient record this month, including phone call with relative; chart review; phone call with other providers.    Regarding the patient's problems: has Coronary artery disease; Dyslipidemia; Type 2 diabetes, onset 2001.  In 2016, A1c was 6.5.; Obstructive sleep apnea with C-PAP use.; Benign prostatic hypertrophy; Anxiety; Osteoarthritis; First degree atrioventricular block; Pleural effusion; Enlarged heart; Benign essential HTN; Abrasion of face; Paroxysmal atrial fibrillation; and Myasthenia gravis without exacerbation on their problem list., the following items were addressed: medical records; medications and any changes can be found within the plan section of the note.  A detailed listing of time spent for chronic care management is tracked within each outreach encounter.  Current medications include:  has a current medication list which includes the following prescription(s): acetaminophen, apixaban, ascorbic acid, cetirizine, coenzyme q10, empagliflozin, finasteride, furosemide, ipratropium, loratadine, metformin, methylcellulose (laxative), metoprolol tartrate, multiple vitamin, potassium chloride, prednisone, pyridostigmine, sucralfate, tamsulosin, and venlafaxine xr. and the patient is reported to be patient is compliant with medication protocol,  Medications are reported to be effective.  All notes on chart for PCP to review.    The patient was monitored remotely for medications.    The patient's physical needs include:  physical healthcare.     The patient's mental support needs include:  needs met    The patient's cognitive support needs include:  needs met    The patient's psychosocial support needs include:  medication management or  adherence    The patient's functional needs include: physical healthcare    The patient's environmental needs include:  not applicable    Care Plan overall comments:  No data recorded    Refer to previous outreach notes for more information on the areas listed above.    Monthly Billing Diagnoses  (E11.9) Type 2 diabetes mellitus without complication, without long-term current use of insulin    (F32.A) Depression, unspecified depression type    Medications   Medications have been reconciled    Care Plan progress this month:      Recently Modified Care Plans Updates made since 2/27/2024 12:00 AM      No recently modified care plans.            Instructions   Patient was provided an electronic copy of care plan  CCM services were explained and offered and patient has accepted these services.  Patient has given their written consent to receive CCM services and understands that this includes the authorization of electronic communication of medical information with the other treating providers.  Patient understands that they may stop CCM services at any time and these changes will be effective at the end of the calendar month and will effectively revocate the agreement of CCM services.  Patient understands that only one practitioner can furnish and be paid for CCM services during one calendar month.  Patient also understands that there may be co-payment or deductible fees in association with CCM services.  Patient will continue with at least monthly follow-up calls with the Ambulatory .    Toyin CASTELLANO  Ambulatory Case Management    3/29/2024, 10:10 EDT

## 2024-03-29 NOTE — OUTREACH NOTE
AMBULATORY CASE MANAGEMENT NOTE    Name and Relationship of Patient/Support Person: StephanesmerStephanie - Emergency Contact    Care Coordination    Reviewed chart. Patient in need of Jardiance assistance. Contacted patient assistance program to discuss eligibility. Representative overrided initial assistance decline. She reports medication should be shipped on 4/1.    [facility] Interim Update    Notified patient's daughter regarding assistance. Daughter states patient is attempting to receive assistance for Eliquis through [employer] program. She states she has not received feedback from assistance program. Advised daughter to contact [facility] if patient needs additional samples. Will attempt to contact [employer] for further information.    Care Coordination    Attempted to contact [employer] assistance program at [phone]. Left detailed voicemail for rep to return call.         Education Documentation  No documentation found.        Toyin CASTELLANO  Ambulatory Case Management    3/29/2024, 10:06 EDT

## 2024-04-15 RX ORDER — POTASSIUM CHLORIDE 20 MEQ/1
20 TABLET, EXTENDED RELEASE ORAL DAILY
Qty: 90 TABLET | Refills: 0 | Status: SHIPPED | OUTPATIENT
Start: 2024-04-15

## 2024-04-17 ENCOUNTER — OFFICE VISIT (OUTPATIENT)
Dept: FAMILY MEDICINE CLINIC | Facility: CLINIC | Age: 89
End: 2024-04-17
Payer: MEDICARE

## 2024-04-17 VITALS
BODY MASS INDEX: 32.85 KG/M2 | DIASTOLIC BLOOD PRESSURE: 88 MMHG | OXYGEN SATURATION: 95 % | WEIGHT: 256 LBS | HEART RATE: 94 BPM | SYSTOLIC BLOOD PRESSURE: 140 MMHG | HEIGHT: 74 IN

## 2024-04-17 DIAGNOSIS — R79.9 ABNORMAL FINDING OF BLOOD CHEMISTRY, UNSPECIFIED: ICD-10-CM

## 2024-04-17 DIAGNOSIS — R10.9 STOMACH ACHE: ICD-10-CM

## 2024-04-17 DIAGNOSIS — E55.9 VITAMIN D DEFICIENCY: ICD-10-CM

## 2024-04-17 DIAGNOSIS — I10 BENIGN ESSENTIAL HTN: ICD-10-CM

## 2024-04-17 DIAGNOSIS — R53.83 OTHER FATIGUE: Primary | ICD-10-CM

## 2024-04-17 PROCEDURE — 99214 OFFICE O/P EST MOD 30 MIN: CPT | Performed by: FAMILY MEDICINE

## 2024-04-18 LAB
25(OH)D3+25(OH)D2 SERPL-MCNC: 29.7 NG/ML (ref 30–100)
FERRITIN SERPL-MCNC: 56 NG/ML (ref 30–400)
IRON SATN MFR SERPL: 31 % (ref 15–55)
IRON SERPL-MCNC: 89 UG/DL (ref 38–169)
T4 FREE SERPL-MCNC: 1.35 NG/DL (ref 0.82–1.77)
TIBC SERPL-MCNC: 287 UG/DL (ref 250–450)
UIBC SERPL-MCNC: 198 UG/DL (ref 111–343)
VIT B12 SERPL-MCNC: 586 PG/ML (ref 232–1245)

## 2024-04-18 NOTE — PROGRESS NOTES
Follow Up Office Visit      Date of Visit:  2024   Patient Name: Arie Fournier  : 1935   MRN: [id number]     Chief Complaint:  No chief complaint on file.      History of Present Illness: Arie Fournier is a 89 y.o. male who is here today for follow up.  Patient seen today for ongoing fatigue malaise and stomach discomfort.  Previous blood work was unrevealing.  Sent for evaluation through ENT for sleep evaluation.  May need adjustment send CPAP machine.  We were able to just forward the previously Pap orders and testing from when he lived in [place] years ago.  This will be evaluated through ENT.  They also give us some orders to check for his ongoing fatigue.        Subjective      Review of Systems:   Review of Systems   Constitutional:  Positive for fatigue. Negative for fever.   HENT:  Negative for congestion and ear pain.    Respiratory:  Negative for apnea, cough, chest tightness and shortness of breath.    Cardiovascular:  Negative for chest pain.   Gastrointestinal:  Positive for nausea. Negative for abdominal pain, constipation and diarrhea.   Musculoskeletal:  Negative for arthralgias.   Psychiatric/Behavioral:  Negative for depressed mood and stress.        Past Medical History:   Past Medical History:   Diagnosis Date    Anxiety     Benign essential hypertension     Benign prostatic hyperplasia     WITH URINARY FREQUENCY    Benign prostatic hypertrophy     Coronary artery disease     Depressive disorder     DJD (degenerative joint disease)     INVOLVING MULTIPLE JOINTS    Dyslipidemia     In 2016:  Total cholesterol 137, triglycerides 180, HDL 38, LDL 63.    First degree atrioventricular block     Hyperglycemia     Injury from a fall in      Mixed hyperlipidemia due to type 2 diabetes mellitus     Myasthenia gravis     Obesity     Obstructive sleep apnea with C-PAP use.     Osteoarthritis     Primary osteoarthritis involving multiple joints     Recurrent  major depressive episodes, moderate     Type 2 diabetes mellitus     WITHOUT HYPERGLYCEMIA WITHOUT LONG TERM CURRENT USE OF INSULIN    Type 2 diabetes, onset 2001.  In 2016, A1c was 6.5.        Past Surgical History:   Past Surgical History:   Procedure Laterality Date    CYST REMOVAL  1976    BACK    HAND SURGERY      TONSILLECTOMY      TUMOR EXCISION      skin excision for tumor       Family History:   Family History   Problem Relation Age of Onset    Stroke Mother     Hypertension Mother     Stroke Father     Hypertension Father     Diabetes Brother     Hypertension Brother     Prostate cancer Brother     Hyperlipidemia Brother        Social History:   Social History     Socioeconomic History    Marital status:    Tobacco Use    Smoking status: Never     Passive exposure: Never    Smokeless tobacco: Never   Vaping Use    Vaping status: Never Used   Substance and Sexual Activity    Alcohol use: Not Currently    Drug use: Never    Sexual activity: Defer       Medications:     Current Outpatient Medications:     acetaminophen (TYLENOL) 325 MG tablet, Take 2 tablets by mouth As Needed for Mild Pain., Disp: , Rfl:     apixaban (ELIQUIS) 2.5 MG tablet tablet, Take 1 tablet by mouth 2 (Two) Times a Day., Disp: 60 tablet, Rfl: 11    ascorbic acid (VITAMIN C) 1000 MG tablet, Take 1 tablet by mouth Daily., Disp: , Rfl:     cetirizine (ZyrTEC) 10 MG tablet, Take 1 tablet by mouth Daily., Disp: , Rfl:     coenzyme Q10 100 MG capsule, Take 2 capsules by mouth Daily., Disp: , Rfl:     empagliflozin (JARDIANCE) 10 MG tablet tablet, Take 1 tablet by mouth Daily., Disp: 90 tablet, Rfl: 1    finasteride (PROSCAR) 5 MG tablet, Take 1 tablet by mouth Daily., Disp: 90 tablet, Rfl: 1    furosemide (LASIX) 20 MG tablet, Take 1 tablet by mouth Daily., Disp: , Rfl:     ipratropium (ATROVENT) 0.06 % nasal spray, USE 2 SPRAY(S) IN EACH NOSTRIL 4 TIMES DAILY AS DIRECTED, Disp: 15 mL, Rfl: 0    Loratadine 10 MG capsule, Take 1 capsule  "by mouth Daily., Disp: , Rfl:     metFORMIN (GLUCOPHAGE) 1000 MG tablet, TAKE 1 TABLET BY MOUTH TWICE DAILY WITH  MORNING  AND  EVENING  MEAL, Disp: 180 tablet, Rfl: 0    methylcellulose, Laxative, (CITRUCEL) 500 MG tablet tablet, Take 2 tablets by mouth As Needed., Disp: , Rfl:     metoprolol tartrate (LOPRESSOR) 25 MG tablet, Take 1/2 (one-half) tablet by mouth twice daily, Disp: 90 tablet, Rfl: 0    Multiple Vitamin (MULTI VITAMIN PO), Take  by mouth Daily., Disp: , Rfl:     potassium chloride (KLOR-CON M20) 20 MEQ CR tablet, Take 1 tablet by mouth once daily, Disp: 90 tablet, Rfl: 0    predniSONE (DELTASONE) 10 MG tablet, Take 1 tablet by mouth Daily., Disp: 90 tablet, Rfl: 3    pyridostigmine (MESTINON) 60 MG tablet, Take 1 tablet by mouth 3 (Three) Times a Day., Disp: 90 tablet, Rfl: 11    sucralfate (CARAFATE) 1 g tablet, Take 1 tablet by mouth 2 (Two) Times a Day With Meals., Disp: 60 tablet, Rfl: 2    tamsulosin (FLOMAX) 0.4 MG capsule 24 hr capsule, Take 1 capsule by mouth Daily., Disp: 90 capsule, Rfl: 1    venlafaxine XR (EFFEXOR-XR) 150 MG 24 hr capsule, Take 1 capsule by mouth once daily, Disp: 30 capsule, Rfl: 2    Allergies:   Allergies   Allergen Reactions    Other Unknown - Low Severity     Horse Serum    Tetanus Immune Globulin Unknown - Low Severity       Objective     Physical Exam:  Vital Signs:   Vitals:    04/17/24 1401   BP: 140/88   Pulse: 94   SpO2: 95%   Weight: 116 kg (256 lb)   Height: 188 cm (74\")     Body mass index is 32.87 kg/m².     Physical Exam  Vitals and nursing note reviewed.   Constitutional:       General: He is not in acute distress.     Appearance: Normal appearance. He is not ill-appearing.   HENT:      Head: Normocephalic and atraumatic.      Right Ear: Tympanic membrane and ear canal normal.      Left Ear: Tympanic membrane and ear canal normal.      Nose: Nose normal.   Cardiovascular:      Rate and Rhythm: Normal rate and regular rhythm.      Heart sounds: Normal " heart sounds.   Pulmonary:      Effort: Pulmonary effort is normal.      Breath sounds: Normal breath sounds.   Neurological:      Mental Status: He is alert and oriented to person, place, and time. Mental status is at baseline.   Psychiatric:         Mood and Affect: Mood normal.         Procedures      Assessment / Plan      Assessment/Plan:   Diagnoses and all orders for this visit:    1. Other fatigue (Primary)  -     Vitamin D 25 hydroxy  -     Vitamin B12  -     Iron and TIBC  -     Ferritin  -     T4, free    2. Vitamin D deficiency  -     Vitamin D 25 hydroxy    3. Abnormal finding of blood chemistry, unspecified  -     Iron and TIBC    4. Benign essential HTN  -     Iron and TIBC  -     Ferritin    5. Stomach ache  -     Ambulatory Referral to Gastroenterology         Checking blood work that was needed for the fatigue that was sent through ENT.  Made referral to GI for the ongoing stomach discomfort and nausea.  Forwarded sleep study to ENT.    Follow Up:   No follow-ups on file.    Mike Bhardwaj  [facility]

## 2024-04-29 ENCOUNTER — TELEPHONE (OUTPATIENT)
Dept: CARDIOLOGY | Facility: CLINIC | Age: 89
End: 2024-04-29
Payer: MEDICARE

## 2024-04-29 NOTE — TELEPHONE ENCOUNTER
Caller: Melanie Fournier     Relationship: SELF    Best call back number: [phone]    What is your medical concern? PATIENT HAS BEEN HAVING SOME PAINS IN HIS CHEST THAT COME AND GO FOR SEVERAL MONTHS NOW. HIS WIFE STATED THAT THEY ARE BECOMING MORE FREQUENT AND THEY THINK HE SHOULD COME IN FOR A VISIT TO GET CHECKED OUT. PLEASE REACH OUT TO HIS WIFE TO ADDRESS THIS. HE IS NOT CURRENTLY HAVING CHEST PAIN.     How long has this issue been going on? SEVERAL MONTHS NOW

## 2024-04-29 NOTE — TELEPHONE ENCOUNTER
"Chest pain reported at last visit, CAD- cath 2002 in [place], mild 30% LAD and RCA. EF 60%. Normal Gina stress and echo 2/2024    Spoke w/pt and pt wife- states chest pain middle to left side of chest, sometimes makes arm \"a little uncomfortable\", occurs at rest and exertion, improves on its own in a few mins. He says he \"stays SOB\", but this is not new. BP and HR while on the phone 116/52 (79). Pt is compliant with all cardiac medications. Please advise.   "

## 2024-04-30 RX ORDER — RANOLAZINE 500 MG/1
500 TABLET, EXTENDED RELEASE ORAL 2 TIMES DAILY
Qty: 180 TABLET | Refills: 3 | Status: SHIPPED | OUTPATIENT
Start: 2024-04-30

## 2024-05-04 DIAGNOSIS — N40.0 BENIGN PROSTATIC HYPERPLASIA, UNSPECIFIED WHETHER LOWER URINARY TRACT SYMPTOMS PRESENT: ICD-10-CM

## 2024-05-06 RX ORDER — FINASTERIDE 5 MG/1
5 TABLET, FILM COATED ORAL DAILY
Qty: 90 TABLET | Refills: 1 | Status: SHIPPED | OUTPATIENT
Start: 2024-05-06

## 2024-05-11 DIAGNOSIS — N40.0 BENIGN PROSTATIC HYPERPLASIA, UNSPECIFIED WHETHER LOWER URINARY TRACT SYMPTOMS PRESENT: ICD-10-CM

## 2024-05-13 RX ORDER — TAMSULOSIN HYDROCHLORIDE 0.4 MG/1
1 CAPSULE ORAL DAILY
Qty: 90 CAPSULE | Refills: 0 | Status: SHIPPED | OUTPATIENT
Start: 2024-05-13

## 2024-05-21 ENCOUNTER — OFFICE VISIT (OUTPATIENT)
Dept: FAMILY MEDICINE CLINIC | Facility: CLINIC | Age: 89
End: 2024-05-21
Payer: MEDICARE

## 2024-05-21 VITALS
WEIGHT: 252 LBS | DIASTOLIC BLOOD PRESSURE: 82 MMHG | OXYGEN SATURATION: 98 % | BODY MASS INDEX: 32.34 KG/M2 | SYSTOLIC BLOOD PRESSURE: 120 MMHG | HEIGHT: 74 IN | HEART RATE: 68 BPM

## 2024-05-21 DIAGNOSIS — G47.33 OBSTRUCTIVE SLEEP APNEA: Primary | ICD-10-CM

## 2024-05-21 DIAGNOSIS — E55.9 VITAMIN D DEFICIENCY: ICD-10-CM

## 2024-05-21 DIAGNOSIS — E11.65 TYPE 2 DIABETES MELLITUS WITH HYPERGLYCEMIA, WITHOUT LONG-TERM CURRENT USE OF INSULIN: ICD-10-CM

## 2024-05-21 PROCEDURE — 99214 OFFICE O/P EST MOD 30 MIN: CPT | Performed by: FAMILY MEDICINE

## 2024-05-21 PROCEDURE — 1126F AMNT PAIN NOTED NONE PRSNT: CPT | Performed by: FAMILY MEDICINE

## 2024-05-21 NOTE — PROGRESS NOTES
Follow Up Office Visit      Date of Visit:  2024   Patient Name: Arie Fournier  : 1935   MRN: [id number]     Chief Complaint:  No chief complaint on file.      History of Present Illness: Arie Fournier is a 89 y.o. male who is here today for follow up.  Arie Collier here to follow-up.  Seen somewhat for fatigue.  Also has sleep apnea.  Would like to have machine adjusted a bit.  He feels that it starts at too low in the setting of pressure.  Has seen cardiology.  They have started Ranexa.  Somewhat better with chest discomfort.        Subjective      Review of Systems:   Review of Systems   Constitutional:  Positive for fatigue. Negative for fever.   HENT:  Negative for congestion and ear pain.    Respiratory:  Negative for apnea, cough, chest tightness and shortness of breath.    Cardiovascular:  Negative for chest pain.   Gastrointestinal:  Negative for abdominal pain, constipation, diarrhea and nausea.   Musculoskeletal:  Negative for arthralgias.   Psychiatric/Behavioral:  Negative for depressed mood and stress.        Past Medical History:   Past Medical History:   Diagnosis Date    Anxiety     Benign essential hypertension     Benign prostatic hyperplasia     WITH URINARY FREQUENCY    Benign prostatic hypertrophy     Coronary artery disease     Depressive disorder     DJD (degenerative joint disease)     INVOLVING MULTIPLE JOINTS    Dyslipidemia     In 2016:  Total cholesterol 137, triglycerides 180, HDL 38, LDL 63.    First degree atrioventricular block     Hyperglycemia     Injury from a fall in      Mixed hyperlipidemia due to type 2 diabetes mellitus     Myasthenia gravis     Obesity     Obstructive sleep apnea with C-PAP use.     Osteoarthritis     Primary osteoarthritis involving multiple joints     Recurrent major depressive episodes, moderate     Type 2 diabetes mellitus     WITHOUT HYPERGLYCEMIA WITHOUT LONG TERM CURRENT USE OF INSULIN    Type 2 diabetes,  onset 2001.  In 2016, A1c was 6.5.        Past Surgical History:   Past Surgical History:   Procedure Laterality Date    CYST REMOVAL  1976    BACK    HAND SURGERY      TONSILLECTOMY      TUMOR EXCISION      skin excision for tumor       Family History:   Family History   Problem Relation Age of Onset    Stroke Mother     Hypertension Mother     Stroke Father     Hypertension Father     Diabetes Brother     Hypertension Brother     Prostate cancer Brother     Hyperlipidemia Brother        Social History:   Social History     Socioeconomic History    Marital status:    Tobacco Use    Smoking status: Never     Passive exposure: Never    Smokeless tobacco: Never   Vaping Use    Vaping status: Never Used   Substance and Sexual Activity    Alcohol use: Not Currently    Drug use: Never    Sexual activity: Defer       Medications:     Current Outpatient Medications:     empagliflozin (JARDIANCE) 25 MG tablet tablet, Take 1 tablet by mouth Daily., Disp: , Rfl:     acetaminophen (TYLENOL) 325 MG tablet, Take 2 tablets by mouth As Needed for Mild Pain., Disp: , Rfl:     apixaban (ELIQUIS) 2.5 MG tablet tablet, Take 1 tablet by mouth 2 (Two) Times a Day., Disp: 60 tablet, Rfl: 11    ascorbic acid (VITAMIN C) 1000 MG tablet, Take 1 tablet by mouth Daily., Disp: , Rfl:     cetirizine (ZyrTEC) 10 MG tablet, Take 1 tablet by mouth Daily., Disp: , Rfl:     coenzyme Q10 100 MG capsule, Take 2 capsules by mouth Daily., Disp: , Rfl:     finasteride (PROSCAR) 5 MG tablet, Take 1 tablet by mouth once daily, Disp: 90 tablet, Rfl: 1    furosemide (LASIX) 20 MG tablet, Take 1 tablet by mouth Daily., Disp: , Rfl:     ipratropium (ATROVENT) 0.06 % nasal spray, USE 2 SPRAY(S) IN EACH NOSTRIL 4 TIMES DAILY AS DIRECTED, Disp: 15 mL, Rfl: 0    Loratadine 10 MG capsule, Take 1 capsule by mouth Daily., Disp: , Rfl:     metFORMIN (GLUCOPHAGE) 1000 MG tablet, TAKE 1 TABLET BY MOUTH TWICE DAILY WITH MORNING MEAL AND WITH EVENING MEAL, Disp:  "180 tablet, Rfl: 1    methylcellulose, Laxative, (CITRUCEL) 500 MG tablet tablet, Take 2 tablets by mouth As Needed., Disp: , Rfl:     metoprolol tartrate (LOPRESSOR) 25 MG tablet, Take 1/2 (one-half) tablet by mouth twice daily, Disp: 90 tablet, Rfl: 0    Multiple Vitamin (MULTI VITAMIN PO), Take  by mouth Daily., Disp: , Rfl:     potassium chloride (KLOR-CON M20) 20 MEQ CR tablet, Take 1 tablet by mouth once daily, Disp: 90 tablet, Rfl: 0    predniSONE (DELTASONE) 10 MG tablet, Take 1 tablet by mouth Daily., Disp: 90 tablet, Rfl: 3    pyridostigmine (MESTINON) 60 MG tablet, Take 1 tablet by mouth 3 (Three) Times a Day., Disp: 90 tablet, Rfl: 11    ranolazine (Ranexa) 500 MG 12 hr tablet, Take 1 tablet by mouth 2 (Two) Times a Day., Disp: 180 tablet, Rfl: 3    sucralfate (CARAFATE) 1 g tablet, Take 1 tablet by mouth 2 (Two) Times a Day With Meals., Disp: 60 tablet, Rfl: 2    tamsulosin (FLOMAX) 0.4 MG capsule 24 hr capsule, Take 1 capsule by mouth once daily, Disp: 90 capsule, Rfl: 0    venlafaxine XR (EFFEXOR-XR) 150 MG 24 hr capsule, Take 1 capsule by mouth once daily, Disp: 30 capsule, Rfl: 2    Allergies:   Allergies   Allergen Reactions    Other Unknown - Low Severity     Horse Serum    Tetanus Immune Globulin Unknown - Low Severity       Objective     Physical Exam:  Vital Signs:   Vitals:    05/21/24 1325   BP: 120/82   Pulse: 68   SpO2: 98%   Weight: 114 kg (252 lb)   Height: 188 cm (74\")     Body mass index is 32.35 kg/m².     Physical Exam  Vitals and nursing note reviewed.   Constitutional:       General: He is not in acute distress.     Appearance: Normal appearance. He is not ill-appearing.   HENT:      Head: Normocephalic and atraumatic.      Right Ear: Tympanic membrane and ear canal normal.      Left Ear: Tympanic membrane and ear canal normal.      Nose: Nose normal.   Cardiovascular:      Rate and Rhythm: Normal rate and regular rhythm.      Heart sounds: Normal heart sounds.   Pulmonary:      " Effort: Pulmonary effort is normal.      Breath sounds: Normal breath sounds.   Neurological:      Mental Status: He is alert and oriented to person, place, and time. Mental status is at baseline.   Psychiatric:         Mood and Affect: Mood normal.         Procedures      Assessment / Plan      Assessment/Plan:   Diagnoses and all orders for this visit:    1. Obstructive sleep apnea with C-PAP use. (Primary)    2. Type 2 diabetes mellitus with hyperglycemia, without long-term current use of insulin  -     empagliflozin (JARDIANCE) 25 MG tablet tablet; Take 1 tablet by mouth Daily.    3. Vitamin D deficiency         Will call medical supply to see if they can adjust his CPAP machine.  Confirmed strength of his Jardiance for his diabetes.  Watch sugars more closely.  This may help with fatigue.  His vitamin D was also low recently on labs and he will start supplementation for fatigue.    Follow Up:   No follow-ups on file.    Mike Bhardwaj  [facility]

## 2024-05-25 DIAGNOSIS — F32.A DEPRESSION, UNSPECIFIED DEPRESSION TYPE: ICD-10-CM

## 2024-05-28 RX ORDER — VENLAFAXINE HYDROCHLORIDE 150 MG/1
150 CAPSULE, EXTENDED RELEASE ORAL DAILY
Qty: 90 CAPSULE | Refills: 1 | Status: SHIPPED | OUTPATIENT
Start: 2024-05-28

## 2024-05-31 ENCOUNTER — OFFICE VISIT (OUTPATIENT)
Dept: GASTROENTEROLOGY | Facility: CLINIC | Age: 89
End: 2024-05-31
Payer: MEDICARE

## 2024-05-31 VITALS
DIASTOLIC BLOOD PRESSURE: 60 MMHG | SYSTOLIC BLOOD PRESSURE: 124 MMHG | HEART RATE: 81 BPM | WEIGHT: 251 LBS | OXYGEN SATURATION: 94 % | HEIGHT: 74 IN | BODY MASS INDEX: 32.21 KG/M2

## 2024-05-31 DIAGNOSIS — R10.13 EPIGASTRIC PAIN: Primary | ICD-10-CM

## 2024-05-31 DIAGNOSIS — R11.0 NAUSEA: ICD-10-CM

## 2024-05-31 PROBLEM — I10 HYPERTENSIVE DISORDER: Status: ACTIVE | Noted: 2022-04-20

## 2024-05-31 PROBLEM — J90 PLEURAL EFFUSION: Status: ACTIVE | Noted: 2022-04-21

## 2024-05-31 PROBLEM — Z86.79 HISTORY OF CARDIAC ARRHYTHMIA: Status: ACTIVE | Noted: 2022-04-20

## 2024-05-31 PROBLEM — R10.9 ABDOMINAL PAIN: Status: ACTIVE | Noted: 2017-03-04

## 2024-05-31 PROBLEM — R06.09 DYSPNEA ON EXERTION: Status: ACTIVE | Noted: 2022-04-21

## 2024-05-31 PROBLEM — Z90.89 HISTORY OF TONSILLECTOMY: Status: ACTIVE | Noted: 2022-04-20

## 2024-05-31 PROBLEM — E78.5 HYPERLIPIDEMIA: Status: ACTIVE | Noted: 2022-04-20

## 2024-05-31 RX ORDER — OMEPRAZOLE 20 MG/1
20 CAPSULE, DELAYED RELEASE ORAL DAILY
Qty: 90 CAPSULE | Refills: 3 | Status: SHIPPED | OUTPATIENT
Start: 2024-05-31

## 2024-05-31 RX ORDER — FLUTICASONE PROPIONATE 50 MCG
2 SPRAY, SUSPENSION (ML) NASAL DAILY
COMMUNITY

## 2024-05-31 NOTE — PROGRESS NOTES
GASTROENTEROLOGY OFFICE NOTE    Arie Fournier  [id number]  1935    CARE TEAM  Patient Care Team:  Mike Bhardwaj MD as PCP - General  Mike Bhardwaj MD as PCP - Family Medicine  Arie Fatima MD as Consulting Physician (Cardiology)  Toyin Sylvester, RN as Ambulatory  (Population Health)  Arie Lester MD as Consulting Physician (Neurology)    Referring Provider: Mike Bhardwaj MD    Chief Complaint   Patient presents with    Abdominal Pain     New patient        HISTORY OF PRESENT ILLNESS:   Arie Fournier is a 89 y.o. male who presents to the clinic today for as a referral from Dr. Bhardwaj regarding mid to upper abdominal discomfort, nausea that seems to be aggravated by hunger or what he describes as empty stomach.     He reports abdominal discomfort, nausea began approximately 1 year ago.  He reports 1 episode of worsening symptoms with coffee intake.  He has a history of mixing cola and tea and discontinue use of cola with possible improvement.  He has also been prescribed Carafate I believe he is taking twice a day with possible improvement.    He has experienced chest discomfort with a big swallow of water.    He usually has 2 bowel movements per day.  I believe he takes Metamucil or Citrucel daily.    He feels as though he previously took Nexium but is not currently taking Nexium.     He reports drinking milk and eating ice cream sandwiches when experiencing hunger or possible nausea.    He reports prior EGD approximately 9 or 10 years ago by female provider.  He feels as though he was told to take Metamucil following EGD.    He reports intermittent increased gas    Past Medical History:   Diagnosis Date    Anxiety     Benign essential hypertension     Benign prostatic hyperplasia     WITH URINARY FREQUENCY    Benign prostatic hypertrophy     Coronary artery disease     Depressive disorder     DJD (degenerative joint disease)     INVOLVING MULTIPLE JOINTS     Dyslipidemia     In March of 2016:  Total cholesterol 137, triglycerides 180, HDL 38, LDL 63.    First degree atrioventricular block     Hyperglycemia     Injury from a fall in 2011     Mixed hyperlipidemia due to type 2 diabetes mellitus     Myasthenia gravis     Obesity     Obstructive sleep apnea with C-PAP use.     Osteoarthritis     Primary osteoarthritis involving multiple joints     Recurrent major depressive episodes, moderate     Type 2 diabetes mellitus     WITHOUT HYPERGLYCEMIA WITHOUT LONG TERM CURRENT USE OF INSULIN    Type 2 diabetes, onset 2001.  In 2016, A1c was 6.5.         Past Surgical History:   Procedure Laterality Date    CYST REMOVAL  1976    BACK    HAND SURGERY      TONSILLECTOMY      TUMOR EXCISION      skin excision for tumor        Current Outpatient Medications on File Prior to Visit   Medication Sig    acetaminophen (TYLENOL) 325 MG tablet Take 2 tablets by mouth As Needed for Mild Pain.    apixaban (ELIQUIS) 2.5 MG tablet tablet Take 1 tablet by mouth 2 (Two) Times a Day.    ascorbic acid (VITAMIN C) 1000 MG tablet Take 1 tablet by mouth Daily.    cetirizine (ZyrTEC) 10 MG tablet Take 1 tablet by mouth Daily.    coenzyme Q10 100 MG capsule Take 2 capsules by mouth Daily.    empagliflozin (JARDIANCE) 25 MG tablet tablet Take 1 tablet by mouth Daily.    finasteride (PROSCAR) 5 MG tablet Take 1 tablet by mouth once daily    fluticasone (FLONASE) 50 MCG/ACT nasal spray 2 sprays into the nostril(s) as directed by provider Daily.    furosemide (LASIX) 20 MG tablet Take 1 tablet by mouth Daily.    Loratadine 10 MG capsule Take 1 capsule by mouth Daily.    metFORMIN (GLUCOPHAGE) 1000 MG tablet TAKE 1 TABLET BY MOUTH TWICE DAILY WITH MORNING MEAL AND WITH EVENING MEAL    methylcellulose, Laxative, (CITRUCEL) 500 MG tablet tablet Take 2 tablets by mouth As Needed.    metoprolol tartrate (LOPRESSOR) 25 MG tablet Take 1/2 (one-half) tablet by mouth twice daily    Multiple Vitamin (MULTI VITAMIN  "PO) Take  by mouth Daily.    potassium chloride (KLOR-CON M20) 20 MEQ CR tablet Take 1 tablet by mouth once daily    predniSONE (DELTASONE) 10 MG tablet Take 1 tablet by mouth Daily.    pyridostigmine (MESTINON) 60 MG tablet Take 1 tablet by mouth 3 (Three) Times a Day.    ranolazine (Ranexa) 500 MG 12 hr tablet Take 1 tablet by mouth 2 (Two) Times a Day.    sucralfate (CARAFATE) 1 g tablet Take 1 tablet by mouth 2 (Two) Times a Day With Meals.    tamsulosin (FLOMAX) 0.4 MG capsule 24 hr capsule Take 1 capsule by mouth once daily    venlafaxine XR (EFFEXOR-XR) 150 MG 24 hr capsule Take 1 capsule by mouth once daily    [DISCONTINUED] ipratropium (ATROVENT) 0.06 % nasal spray USE 2 SPRAY(S) IN EACH NOSTRIL 4 TIMES DAILY AS DIRECTED (Patient not taking: Reported on 5/31/2024)     No current facility-administered medications on file prior to visit.       Allergies   Allergen Reactions    Other Unknown - Low Severity     Horse Serum    Tetanus Immune Globulin Unknown - Low Severity       Family History   Problem Relation Age of Onset    Stroke Mother     Hypertension Mother     Stroke Father     Hypertension Father     Diabetes Brother     Hypertension Brother     Prostate cancer Brother     Hyperlipidemia Brother        Social History     Socioeconomic History    Marital status:    Tobacco Use    Smoking status: Never     Passive exposure: Never    Smokeless tobacco: Never   Vaping Use    Vaping status: Never Used   Substance and Sexual Activity    Alcohol use: Not Currently    Drug use: Never    Sexual activity: Defer       PHYSICAL EXAM   /60 (BP Location: Left arm, Patient Position: Sitting, Cuff Size: Adult)   Pulse 81   Ht 188 cm (74\")   Wt 114 kg (251 lb)   SpO2 94%   BMI 32.23 kg/m²   Physical Exam  Constitutional:       General: He is not in acute distress.     Appearance: He is not toxic-appearing.   HENT:      Head: Normocephalic and atraumatic. No contusion.      Right Ear: External ear " normal.      Left Ear: External ear normal.   Eyes:      General: Lids are normal. No scleral icterus.        Right eye: No discharge.         Left eye: No discharge.      Extraocular Movements: Extraocular movements intact.   Neck:      Trachea: Trachea normal.      Comments: No visible mass  No visible adenopathy  Cardiovascular:      Rate and Rhythm: Normal rate.   Pulmonary:      Effort: No respiratory distress.      Comments: Symmetrical expansion    Abdominal:      Palpations: Abdomen is soft. There is no mass.   Musculoskeletal:      Comments: Symmetrical movement of upper extremities  Symmetrical movement of lower extremities  No visible deformities   Skin:     General: Skin is warm and dry.      Coloration: Skin is not jaundiced.   Neurological:      General: No focal deficit present.      Mental Status: He is alert and oriented to person, place, and time.   Psychiatric:         Mood and Affect: Mood normal.         Behavior: Behavior normal.         Thought Content: Thought content normal.     Results Review:    CMP          7/4/2023    12:05 7/23/2023    13:27 3/13/2024    12:08   CMP   Glucose 175  140  213    BUN 20  19  25    Creatinine 0.93  0.86  0.89    EGFR 79.5  83.8     Sodium 138  133  142    Potassium 4.5  4.1  4.1    Chloride 98  95  98    Calcium 9.7  8.9  9.5    Total Protein   6.8    Total Protein 6.8  6.4     Albumin 4.1  3.9  4.5    Globulin   2.3    Globulin 2.7  2.5     Total Bilirubin 0.7  0.5  0.7    Alkaline Phosphatase 69  58  79    AST (SGOT) 24  16  24    ALT (SGPT) 22  16  27    Albumin/Globulin Ratio 1.5  1.6     BUN/Creatinine Ratio 21.5  22.1  28    Anion Gap 14.0  8.0       CBC          7/4/2023    12:05 7/23/2023    13:27 3/13/2024    12:08   CBC   WBC 7.57  6.75  8.0    RBC 5.39  4.60  5.60    Hemoglobin 16.1  14.1  17.2    Hematocrit 50.4  43.8  52.0    MCV 93.5  95.2  93    MCH 29.9  30.7  30.7    MCHC 31.9  32.2  33.1    RDW 14.6  14.5  13.2    Platelets 215  181  210         ASSESSMENT / PLAN  1. Epigastric pain  2. Nausea  - he may complete current bottle of sucralfate then I would suggest trial off sucralfate  - start omeprazole daily  - recommend scheduled eating around 8 am, noon or 1 and 5 or 6 pm as it seems as though he feels worse when hungry  - recommend he decrease milk/dairy intake and increase protein intake  - I am concerned fluctuating glucose and hyperglycemia could be contributing. Better control of diabetes could be helpful  - consider EGD if no improvement in 6 weeks taking omeprazole and changing eating habits or if worse in the future  - recommend he check with provider that prescribes Eliquis to determine if eliquis can be stopped for possible EGD  - lifestyle modifications for reflux provided in the office   - continue fiber supplement to try to achieve 1 or 2 productive bowel movements daily  - US abdomen for additional evaluation.   - consider Gas x as needed.  He does take pyridostigmine for myasthenia gravis which could help bowel habits  - omeprazole (priLOSEC) 20 MG capsule; Take 1 capsule by mouth Daily. 30 minutes prior to a meal  Dispense: 90 capsule; Refill: 3  - US Abdomen Complete; Future      Return in about 3 months (around 8/31/2024).    Karen Cuello, APRN  05/31/2024

## 2024-05-31 NOTE — PATIENT INSTRUCTIONS
Follow a diet as recommended by your health care provider. This may involve avoiding foods and drinks such as:  Coffee and tea (with or without caffeine).  Drinks that contain alcohol.  Energy drinks and sports drinks.  Carbonated drinks or sodas.  Chocolate and cocoa.  Peppermint and mint flavorings.  Garlic and onions.  Horseradish.  Spicy and acidic foods, including peppers, chili powder, sharma powder, vinegar, hot sauces, and barbecue sauce.  Citrus fruit juices and citrus fruits, such as oranges, kriss, and limes.  Tomato-based foods, such as red sauce, chili, salsa, and pizza with red sauce.  Fried and fatty foods, such as donuts, french fries, potato chips, and high-fat dressings.    Eat small, frequent meals instead of large meals.  Avoid drinking large amounts of liquid with your meals.  Avoid eating meals during the 2-3 hours before bedtime.  Avoid lying down right after you eat.     Take omeprazole 20 mg once daily, 30 minutes prior to a meal    Consider gas-x as needed

## 2024-06-21 DIAGNOSIS — R10.9 STOMACH PAIN: ICD-10-CM

## 2024-06-21 RX ORDER — SUCRALFATE 1 G/1
1 TABLET ORAL 2 TIMES DAILY WITH MEALS
Qty: 60 TABLET | Refills: 5 | Status: SHIPPED | OUTPATIENT
Start: 2024-06-21

## 2024-07-10 ENCOUNTER — HOSPITAL ENCOUNTER (OUTPATIENT)
Dept: ULTRASOUND IMAGING | Facility: HOSPITAL | Age: 89
Discharge: HOME | End: 2024-07-10
Admitting: NURSE PRACTITIONER
Payer: MEDICARE

## 2024-07-10 DIAGNOSIS — R10.13 EPIGASTRIC PAIN: ICD-10-CM

## 2024-07-10 PROCEDURE — 76700 US EXAM ABDOM COMPLETE: CPT

## 2024-07-15 RX ORDER — FUROSEMIDE 40 MG/1
40 TABLET ORAL DAILY
Qty: 90 TABLET | Refills: 0 | OUTPATIENT
Start: 2024-07-15

## 2024-07-22 ENCOUNTER — TELEPHONE (OUTPATIENT)
Dept: NEUROLOGY | Facility: CLINIC | Age: 89
End: 2024-07-22
Payer: MEDICARE

## 2024-07-22 NOTE — TELEPHONE ENCOUNTER
Tried to call x2  phone was answered an immediately hung up.  Sent [employer] [facility]

## 2024-07-23 ENCOUNTER — TELEPHONE (OUTPATIENT)
Dept: FAMILY MEDICINE CLINIC | Facility: CLINIC | Age: 89
End: 2024-07-23
Payer: MEDICARE

## 2024-07-23 RX ORDER — POTASSIUM CHLORIDE 20 MEQ/1
20 TABLET, EXTENDED RELEASE ORAL DAILY
Qty: 90 TABLET | Refills: 0 | Status: SHIPPED | OUTPATIENT
Start: 2024-07-23

## 2024-07-23 RX ORDER — FUROSEMIDE 40 MG/1
40 TABLET ORAL DAILY
Qty: 90 TABLET | Refills: 0 | OUTPATIENT
Start: 2024-07-23

## 2024-07-23 NOTE — TELEPHONE ENCOUNTER
Patient's daughter called needing to ask Alejandrina a few question regarding the janny garcias patient assistance form for eliquis please advise

## 2024-07-24 RX ORDER — FUROSEMIDE 40 MG/1
40 TABLET ORAL DAILY
Qty: 90 TABLET | Refills: 0 | OUTPATIENT
Start: 2024-07-24

## 2024-07-24 RX ORDER — FUROSEMIDE 20 MG/1
20 TABLET ORAL DAILY
Qty: 30 TABLET | Refills: 2 | Status: SHIPPED | OUTPATIENT
Start: 2024-07-24

## 2024-07-24 NOTE — TELEPHONE ENCOUNTER
"DAUGHTER HAS CALLED BACK VERY UPSET THAT \"NO ONE HAS CALLED HER BACK TO ANSWER OR EVEN ATTEMPT TO ANSWER HER QUESTIONS, IS THERE NO ONE TO HELP!\"    KHUSHI HAS INFORMED HER THAT A MESSAGE IS IN FOR JIGAR WHEN SHE RETURNS BUT UNTIL THEN WE ARE PUTTING ANOTHER MESSAGE THRU TO SEE IF SOMEONE IN OFFICE COULD HELP HER.    "

## 2024-07-24 NOTE — TELEPHONE ENCOUNTER
Caller: Melanie Fournier    Relationship: Emergency Contact    Best call back number: [phone]     Requested Prescriptions:   Requested Prescriptions     Pending Prescriptions Disp Refills    furosemide (LASIX) 20 MG tablet       Sig: Take 1 tablet by mouth Daily.        Pharmacy where request should be sent: [address] [phone] [facility] [phone]      Last office visit with prescribing clinician: 5/21/2024   Last telemedicine visit with prescribing clinician: Visit date not found   Next office visit with prescribing clinician: 9/3/2024     Additional details provided by patient: PATIENT IS OUT OF MEDICATION     Does the patient have less than a 3 day supply:  [x] Yes  [] No    Would you like a call back once the refill request has been completed: [] Yes [x] No    If the office needs to give you a call back, can they leave a voicemail: [] Yes [x] No    Shona Padilla MA   07/24/24 13:17 EDT

## 2024-07-24 NOTE — TELEPHONE ENCOUNTER
This is the first message I have received regarding the need for additional help. In regards to medication cost. I would recommend the provider place a referral to [facility]. I will also reach out to [facility] Stokes to see if she can help patient's daughter.

## 2024-07-25 NOTE — TELEPHONE ENCOUNTER
Patient is no longer in ACM program. Will contact daughter to indicate SW involvement for medication cost.

## 2024-07-25 NOTE — TELEPHONE ENCOUNTER
Spoke to patient's daughterStephanie regarding patient's assistance form for Eliquis. Indicated eligibility requirements. Daughter plans to contact pharmacy to see if patient meets 3% out of pocket requirement.  Also indicated that SW will call for additional assistance.

## 2024-07-26 ENCOUNTER — PATIENT OUTREACH (OUTPATIENT)
Age: 89
End: 2024-07-26
Payer: MEDICARE

## 2024-07-26 NOTE — OUTREACH NOTE
Patient Outreach    SW received referral via Rn-ACM re: medication cost. SW was advised to contact patient daughter (Stephanie). SW called and spoke to Stephanie re: medication. Per daughter, will likely not qualify for assistance as patient has not met 3% out of pocket expense requirement to be approved for assistance. SW provided education about PCP possibly switching medication regiment to something more affordable. Daughter verbalized understanding. SW to notify PCP and RN-ACM. SW to discharge as no additional needs noted, at this time. Please re consult SW if additional needs arise in the future.     Divya WALKER -   Ambulatory Case Management    7/26/2024, 13:39 EDT

## 2024-08-03 DIAGNOSIS — N40.0 BENIGN PROSTATIC HYPERPLASIA, UNSPECIFIED WHETHER LOWER URINARY TRACT SYMPTOMS PRESENT: ICD-10-CM

## 2024-08-05 ENCOUNTER — OFFICE VISIT (OUTPATIENT)
Dept: CARDIOLOGY | Facility: CLINIC | Age: 89
End: 2024-08-05
Payer: MEDICARE

## 2024-08-05 VITALS
HEART RATE: 94 BPM | OXYGEN SATURATION: 95 % | HEIGHT: 74 IN | DIASTOLIC BLOOD PRESSURE: 70 MMHG | WEIGHT: 247 LBS | SYSTOLIC BLOOD PRESSURE: 112 MMHG | BODY MASS INDEX: 31.7 KG/M2

## 2024-08-05 DIAGNOSIS — I25.10 CORONARY ARTERY DISEASE INVOLVING NATIVE CORONARY ARTERY OF NATIVE HEART WITHOUT ANGINA PECTORIS: Primary | ICD-10-CM

## 2024-08-05 DIAGNOSIS — I48.0 PAROXYSMAL ATRIAL FIBRILLATION: ICD-10-CM

## 2024-08-05 DIAGNOSIS — E78.5 DYSLIPIDEMIA: ICD-10-CM

## 2024-08-05 DIAGNOSIS — I10 BENIGN ESSENTIAL HTN: ICD-10-CM

## 2024-08-05 PROCEDURE — 99214 OFFICE O/P EST MOD 30 MIN: CPT | Performed by: INTERNAL MEDICINE

## 2024-08-05 RX ORDER — RANOLAZINE 1000 MG/1
1000 TABLET, EXTENDED RELEASE ORAL 2 TIMES DAILY
Qty: 180 TABLET | Refills: 3 | Status: SHIPPED | OUTPATIENT
Start: 2024-08-05 | End: 2024-08-05 | Stop reason: SDUPTHER

## 2024-08-05 RX ORDER — RANOLAZINE 1000 MG/1
1000 TABLET, EXTENDED RELEASE ORAL 2 TIMES DAILY
Qty: 180 TABLET | Refills: 3 | Status: SHIPPED | OUTPATIENT
Start: 2024-08-05

## 2024-08-05 RX ORDER — TAMSULOSIN HYDROCHLORIDE 0.4 MG/1
1 CAPSULE ORAL DAILY
Qty: 90 CAPSULE | Refills: 0 | Status: SHIPPED | OUTPATIENT
Start: 2024-08-05

## 2024-08-05 NOTE — PROGRESS NOTES
OFFICE VISIT  NOTE  [facility] CARDIOLOGY      Name: Arie Fournier    Date: 2024  MRN:  [id number]  :  1935      REFERRING/PRIMARY PROVIDER:  Mike Bhardwaj MD    Chief Complaint   Patient presents with    Coronary artery disease involving native coronary artery of     Patient complains of dizziness and weakness       HPI: Arie Fournier is a 89 y.o. male who presents for CAD, PAF, hypertension, hyperlipidemia.  Cath  in [place], mild 30% LAD and RCA.  EF 60% history of myasthenia gravis as well.  History of PAF on apixaban 2.5 mg last echo 2023 EF 51% RVSP normal mild AI.  Main complaint is weakness fatigue, shortness of breath, sleeping in a recliner over the last couple months.  Still having occasional chest pains, undergoing EGD soon with biopsy possible.  Chest pain is sharp, fleeting lasting a few seconds, atypical.    Past Medical History:   Diagnosis Date    Anxiety     Benign essential hypertension     Benign prostatic hyperplasia     WITH URINARY FREQUENCY    Benign prostatic hypertrophy     Coronary artery disease     Depressive disorder     DJD (degenerative joint disease)     INVOLVING MULTIPLE JOINTS    Dyslipidemia     In 2016:  Total cholesterol 137, triglycerides 180, HDL 38, LDL 63.    First degree atrioventricular block     Hyperglycemia     Injury from a fall in      Mixed hyperlipidemia due to type 2 diabetes mellitus     Myasthenia gravis     Obesity     Obstructive sleep apnea with C-PAP use.     Osteoarthritis     Primary osteoarthritis involving multiple joints     Recurrent major depressive episodes, moderate     Type 2 diabetes mellitus     WITHOUT HYPERGLYCEMIA WITHOUT LONG TERM CURRENT USE OF INSULIN    Type 2 diabetes, onset .  In 2016, A1c was 6.5.        Past Surgical History:   Procedure Laterality Date    CYST REMOVAL      BACK    HAND SURGERY      TONSILLECTOMY      TUMOR EXCISION      skin excision for  tumor       Social History     Socioeconomic History    Marital status:    Tobacco Use    Smoking status: Never     Passive exposure: Never    Smokeless tobacco: Never   Vaping Use    Vaping status: Never Used   Substance and Sexual Activity    Alcohol use: Not Currently    Drug use: Never    Sexual activity: Defer       Family History   Problem Relation Age of Onset    Stroke Mother     Hypertension Mother     Stroke Father     Hypertension Father     Diabetes Brother     Hypertension Brother     Prostate cancer Brother     Hyperlipidemia Brother         ROS:   Constitutional no fever,  no weight loss   Skin no rash, no subcutaneous nodules   Otolaryngeal no difficulty swallowing   Cardiovascular See HPI   Pulmonary no cough, no sputum production   Gastrointestinal no constipation, no diarrhea   Genitourinary no dysuria, no hematuria   Hematologic no easy bruisability, no abnormal bleeding   Musculoskeletal no muscle pain   Neurologic no dizziness, no falls         Allergies   Allergen Reactions    Other Unknown - Low Severity     Horse Serum    Tetanus Immune Globulin Unknown - Low Severity         Current Outpatient Medications:     acetaminophen (TYLENOL) 325 MG tablet, Take 2 tablets by mouth As Needed for Mild Pain., Disp: , Rfl:     apixaban (ELIQUIS) 2.5 MG tablet tablet, Take 1 tablet by mouth 2 (Two) Times a Day., Disp: 60 tablet, Rfl: 11    ascorbic acid (VITAMIN C) 1000 MG tablet, Take 1 tablet by mouth Daily., Disp: , Rfl:     cetirizine (ZyrTEC) 10 MG tablet, Take 1 tablet by mouth Daily., Disp: , Rfl:     coenzyme Q10 100 MG capsule, Take 2 capsules by mouth Daily., Disp: , Rfl:     empagliflozin (JARDIANCE) 25 MG tablet tablet, Take 1 tablet by mouth Daily., Disp: , Rfl:     finasteride (PROSCAR) 5 MG tablet, Take 1 tablet by mouth once daily, Disp: 90 tablet, Rfl: 1    fluticasone (FLONASE) 50 MCG/ACT nasal spray, 2 sprays into the nostril(s) as directed by provider Daily., Disp: , Rfl:      "furosemide (LASIX) 20 MG tablet, Take 1 tablet by mouth Daily., Disp: 30 tablet, Rfl: 2    Loratadine 10 MG capsule, Take 1 capsule by mouth Daily., Disp: , Rfl:     metFORMIN (GLUCOPHAGE) 1000 MG tablet, TAKE 1 TABLET BY MOUTH TWICE DAILY WITH MORNING MEAL AND WITH EVENING MEAL, Disp: 180 tablet, Rfl: 1    methylcellulose, Laxative, (CITRUCEL) 500 MG tablet tablet, Take 2 tablets by mouth As Needed., Disp: , Rfl:     metoprolol tartrate (LOPRESSOR) 25 MG tablet, Take 1/2 (one-half) tablet by mouth twice daily, Disp: 90 tablet, Rfl: 0    Multiple Vitamin (MULTI VITAMIN PO), Take  by mouth Daily., Disp: , Rfl:     omeprazole (priLOSEC) 20 MG capsule, Take 1 capsule by mouth Daily. 30 minutes prior to a meal, Disp: 90 capsule, Rfl: 3    potassium chloride (KLOR-CON M20) 20 MEQ CR tablet, Take 1 tablet by mouth once daily, Disp: 90 tablet, Rfl: 0    predniSONE (DELTASONE) 10 MG tablet, Take 1 tablet by mouth Daily., Disp: 90 tablet, Rfl: 3    pyridostigmine (MESTINON) 60 MG tablet, Take 1 tablet by mouth 3 (Three) Times a Day., Disp: 90 tablet, Rfl: 11    ranolazine (Ranexa) 500 MG 12 hr tablet, Take 1 tablet by mouth 2 (Two) Times a Day., Disp: 180 tablet, Rfl: 3    sucralfate (CARAFATE) 1 g tablet, TAKE 1 TABLET BY MOUTH TWICE DAILY WITH MEALS, Disp: 60 tablet, Rfl: 5    tamsulosin (FLOMAX) 0.4 MG capsule 24 hr capsule, Take 1 capsule by mouth once daily, Disp: 90 capsule, Rfl: 0    venlafaxine XR (EFFEXOR-XR) 150 MG 24 hr capsule, Take 1 capsule by mouth once daily, Disp: 90 capsule, Rfl: 1    Vitals:    08/05/24 1532   BP: 112/70   Pulse: 94   SpO2: 95%   Weight: 112 kg (247 lb)   Height: 188 cm (74\")     Body mass index is 31.71 kg/m².    PHYSICAL EXAM:    General Appearance:   well developed  well nourished  HENT:   oropharynx moist  lips not cyanotic  Neck:  thyroid not enlarged  supple  Respiratory:  no respiratory distress  normal breath sounds  no rales  Cardiovascular:  no jugular venous distention  regular " "rhythm  apical impulse normal  S1 normal, S2 normal  no S3, no S4   no murmur  no rub, no thrill  carotid pulses normal; no bruit  lower extremity edema: none      Musculoskeletal:  no clubbing of fingers.   normocephalic, head atraumatic  Skin:   warm, dry  Psychiatric:  judgement and insight appropriate  normal mood and affect    RESULTS:   Procedures    Results for orders placed in visit on 02/19/24    Adult Transthoracic Echo Complete w/ Color, Spectral and Contrast if necessary per protocol    Interpretation Summary    Left ventricular systolic function is hyperdynamic (EF > 70%). Calculated left ventricular EF = 73% Left ventricular ejection fraction appears to be 66 - 70%.    Left ventricular diastolic function was normal.    The left atrial cavity is mildly dilated.    Estimated right ventricular systolic pressure from tricuspid regurgitation is normal (<35 mmHg). Calculated right ventricular systolic pressure from tricuspid regurgitation is 30 mmHg.    Mild to moderate aortic valve regurgitation.    No significant change compared to 4/2023        Labs:  Lab Results   Component Value Date    TRIG 279 (H) 03/13/2024    HDL 52 03/13/2024     (H) 03/13/2024    AST 24 03/13/2024    ALT 27 03/13/2024     Lab Results   Component Value Date    HGBA1C 8.7 (H) 03/13/2024     No components found for: \"CREATINININE\"  No results found for: \"EGFRIFNONA\"    Most recent PCP note, imaging tests, and labs reviewed.    ASSESSMENT:  Problem List Items Addressed This Visit       Coronary artery disease - Primary    Overview     In 2002, left heart catheterization with minimal diffuse disease in the right coronary artery and minimal stenosis of 30% to the proximal LAD.  Left ventricular ejection fraction 50%.  Mild elevation of left ventricular end diastolic pressure, at [place], [place], per Chad Montes De Oca.    DIAGNOSTIC DATA:      April 22, 2016:  EKG:  Sinus rhythm with first-degree AV block, " otherwise normal EKG with a ventricular rate of 60 beats per minute.  LA interval of 260 msec., QRS duration of 92 msec., QTC of 414 msec.        March of 2016:  Total cholesterol of 137, triglycerides of 180, HDL 38, LDL of 63.   Hemoglobin A1c of 6.5.    Sodium 139, potassium 4.3, chloride 101, CO2 of 25, BUN of 21, creatinine of 0.6, glucose of 113.   TSH of 1.5.   WBC of 6.3, hemoglobin of 15, hematocrit of 47, platelet count of 212,000.            Dyslipidemia    Overview     In March of 2016:  Total cholesterol 137, triglycerides 180, HDL 38, LDL 63.           Benign essential HTN    Paroxysmal atrial fibrillation       PLAN:    1.  Chest pain:  Low risk nuclear stress test 2/2024  Echo 2/2024 showed EF 70%, mild left atrial enlargement, mild to moderate aortic valve regurgitation no change from 4/2023  Increase Ranexa    2.  CAD:  Continue pharmacotherapy for prevention    Increasing Ranexa due to angina    3.  Hyperlipidemia:  We stopped atorvastatin due to muscle weakness, he is unsure if it helped or not.    4.  PAF:  Continue Eliquis for stroke prevention  Samples given today    5.  Hypertension:  Well-controlled on current regimen, continue for now.    Patient is extremely hard of hearing his wife took notes during the visit    Advance Care Planning   ACP discussion was held with the patient during this visit. Patient has an advance directive (not in EMR), copy requested.         Return to clinic in 9 months, or sooner as needed.    Thank you for the opportunity to share in the care of your patient; please do not hesitate to call me with any questions.     Jonn Dickens MD, FAC, [facility]  Office: [phone] [address] [place] [address]    08/05/24

## 2024-09-03 ENCOUNTER — OFFICE VISIT (OUTPATIENT)
Dept: FAMILY MEDICINE CLINIC | Facility: CLINIC | Age: 89
End: 2024-09-03
Payer: MEDICARE

## 2024-09-03 VITALS
HEART RATE: 76 BPM | SYSTOLIC BLOOD PRESSURE: 130 MMHG | HEIGHT: 74 IN | BODY MASS INDEX: 31.57 KG/M2 | OXYGEN SATURATION: 98 % | WEIGHT: 246 LBS | DIASTOLIC BLOOD PRESSURE: 70 MMHG

## 2024-09-03 DIAGNOSIS — E78.5 DYSLIPIDEMIA: ICD-10-CM

## 2024-09-03 DIAGNOSIS — E55.9 VITAMIN D DEFICIENCY: ICD-10-CM

## 2024-09-03 DIAGNOSIS — M25.50 ARTHRALGIA, UNSPECIFIED JOINT: ICD-10-CM

## 2024-09-03 DIAGNOSIS — G70.00 MYASTHENIA GRAVIS WITHOUT EXACERBATION: Chronic | ICD-10-CM

## 2024-09-03 DIAGNOSIS — M19.09 OSTEOARTHRITIS OF OTHER SITE, UNSPECIFIED OSTEOARTHRITIS TYPE: ICD-10-CM

## 2024-09-03 DIAGNOSIS — I25.10 CORONARY ARTERY DISEASE INVOLVING NATIVE CORONARY ARTERY OF NATIVE HEART WITHOUT ANGINA PECTORIS: ICD-10-CM

## 2024-09-03 DIAGNOSIS — E11.65 TYPE 2 DIABETES MELLITUS WITH HYPERGLYCEMIA, WITHOUT LONG-TERM CURRENT USE OF INSULIN: ICD-10-CM

## 2024-09-03 DIAGNOSIS — N40.0 BENIGN PROSTATIC HYPERPLASIA, UNSPECIFIED WHETHER LOWER URINARY TRACT SYMPTOMS PRESENT: ICD-10-CM

## 2024-09-03 DIAGNOSIS — I48.0 PAROXYSMAL ATRIAL FIBRILLATION: ICD-10-CM

## 2024-09-03 DIAGNOSIS — Z00.00 MEDICARE ANNUAL WELLNESS VISIT, SUBSEQUENT: Primary | ICD-10-CM

## 2024-09-03 DIAGNOSIS — I10 BENIGN ESSENTIAL HTN: ICD-10-CM

## 2024-09-03 DIAGNOSIS — D48.5 NEOPLASM OF UNCERTAIN BEHAVIOR OF SKIN: ICD-10-CM

## 2024-09-03 DIAGNOSIS — G47.33 OBSTRUCTIVE SLEEP APNEA: ICD-10-CM

## 2024-09-03 LAB
EXPIRATION DATE: ABNORMAL
HBA1C MFR BLD: 6.8 % (ref 4.5–5.7)
Lab: ABNORMAL

## 2024-09-04 LAB
25(OH)D3+25(OH)D2 SERPL-MCNC: 35.5 NG/ML (ref 30–100)
ALBUMIN SERPL-MCNC: 4.5 G/DL (ref 3.7–4.7)
ALBUMIN/CREAT UR: <8 MG/G CREAT (ref 0–29)
ALP SERPL-CCNC: 60 IU/L (ref 44–121)
ALT SERPL-CCNC: 18 IU/L (ref 0–44)
AST SERPL-CCNC: 16 IU/L (ref 0–40)
BASOPHILS # BLD AUTO: 0 X10E3/UL (ref 0–0.2)
BASOPHILS NFR BLD AUTO: 0 %
BILIRUB SERPL-MCNC: 0.6 MG/DL (ref 0–1.2)
BUN SERPL-MCNC: 25 MG/DL (ref 8–27)
BUN/CREAT SERPL: 24 (ref 10–24)
CALCIUM SERPL-MCNC: 9.5 MG/DL (ref 8.6–10.2)
CHLORIDE SERPL-SCNC: 97 MMOL/L (ref 96–106)
CHOLEST SERPL-MCNC: 212 MG/DL (ref 100–199)
CO2 SERPL-SCNC: 25 MMOL/L (ref 20–29)
CREAT SERPL-MCNC: 1.05 MG/DL (ref 0.76–1.27)
CREAT UR-MCNC: 36.4 MG/DL
EGFRCR SERPLBLD CKD-EPI 2021: 68 ML/MIN/1.73
EOSINOPHIL # BLD AUTO: 0.1 X10E3/UL (ref 0–0.4)
EOSINOPHIL NFR BLD AUTO: 1 %
ERYTHROCYTE [DISTWIDTH] IN BLOOD BY AUTOMATED COUNT: 13.8 % (ref 11.6–15.4)
GLOBULIN SER CALC-MCNC: 2 G/DL (ref 1.5–4.5)
GLUCOSE SERPL-MCNC: 136 MG/DL (ref 70–99)
HCT VFR BLD AUTO: 50.2 % (ref 37.5–51)
HDLC SERPL-MCNC: 52 MG/DL
HGB BLD-MCNC: 16.8 G/DL (ref 13–17.7)
IMM GRANULOCYTES # BLD AUTO: 0 X10E3/UL (ref 0–0.1)
IMM GRANULOCYTES NFR BLD AUTO: 0 %
LDLC SERPL CALC-MCNC: 117 MG/DL (ref 0–99)
LYMPHOCYTES # BLD AUTO: 0.9 X10E3/UL (ref 0.7–3.1)
LYMPHOCYTES NFR BLD AUTO: 12 %
MCH RBC QN AUTO: 32.2 PG (ref 26.6–33)
MCHC RBC AUTO-ENTMCNC: 33.5 G/DL (ref 31.5–35.7)
MCV RBC AUTO: 96 FL (ref 79–97)
MICROALBUMIN UR-MCNC: <3 UG/ML
MONOCYTES # BLD AUTO: 0.5 X10E3/UL (ref 0.1–0.9)
MONOCYTES NFR BLD AUTO: 7 %
NEUTROPHILS # BLD AUTO: 6.4 X10E3/UL (ref 1.4–7)
NEUTROPHILS NFR BLD AUTO: 80 %
PLATELET # BLD AUTO: 210 X10E3/UL (ref 150–450)
POTASSIUM SERPL-SCNC: 4.8 MMOL/L (ref 3.5–5.2)
PROT SERPL-MCNC: 6.5 G/DL (ref 6–8.5)
RBC # BLD AUTO: 5.21 X10E6/UL (ref 4.14–5.8)
SODIUM SERPL-SCNC: 139 MMOL/L (ref 134–144)
TRIGL SERPL-MCNC: 246 MG/DL (ref 0–149)
TSH SERPL DL<=0.005 MIU/L-ACNC: 1.91 UIU/ML (ref 0.45–4.5)
VLDLC SERPL CALC-MCNC: 43 MG/DL (ref 5–40)
WBC # BLD AUTO: 7.9 X10E3/UL (ref 3.4–10.8)

## 2024-09-04 NOTE — PROGRESS NOTES
Subjective   The ABCs of the Annual Wellness Visit  Medicare Wellness Visit      Arie Fournier is a 89 y.o. patient who presents for a Medicare Wellness Visit.    The following portions of the patient's history were reviewed and   updated as appropriate: allergies, current medications, past family history, past medical history, past social history, past surgical history, and problem list.    Compared to one year ago, the patient's physical   health is the same.  Compared to one year ago, the patient's mental   health is the same.    Recent Hospitalizations:  He was not admitted to the hospital during the last year.     Current Medical Providers:  Patient Care Team:  Mike Bhardwaj MD as PCP - General  Mike Bhardwaj MD as PCP - Family Medicine  Arie Fatima MD as Consulting Physician (Cardiology)  Arie Lester MD as Consulting Physician (Neurology)  Jonn Dickens MD as Consulting Physician (Cardiology)    Outpatient Medications Prior to Visit   Medication Sig Dispense Refill    acetaminophen (TYLENOL) 325 MG tablet Take 2 tablets by mouth As Needed for Mild Pain.      apixaban (ELIQUIS) 2.5 MG tablet tablet Take 1 tablet by mouth 2 (Two) Times a Day. 60 tablet 11    ascorbic acid (VITAMIN C) 1000 MG tablet Take 1 tablet by mouth Daily.      cetirizine (ZyrTEC) 10 MG tablet Take 1 tablet by mouth Daily.      coenzyme Q10 100 MG capsule Take 2 capsules by mouth Daily.      empagliflozin (JARDIANCE) 25 MG tablet tablet Take 1 tablet by mouth Daily.      finasteride (PROSCAR) 5 MG tablet Take 1 tablet by mouth once daily 90 tablet 1    fluticasone (FLONASE) 50 MCG/ACT nasal spray 2 sprays into the nostril(s) as directed by provider Daily.      furosemide (LASIX) 20 MG tablet Take 1 tablet by mouth Daily. 30 tablet 2    Loratadine 10 MG capsule Take 1 capsule by mouth Daily.      metFORMIN (GLUCOPHAGE) 1000 MG tablet TAKE 1 TABLET BY MOUTH TWICE DAILY WITH MORNING MEAL AND WITH EVENING MEAL  180 tablet 1    methylcellulose, Laxative, (CITRUCEL) 500 MG tablet tablet Take 2 tablets by mouth As Needed.      metoprolol tartrate (LOPRESSOR) 25 MG tablet Take 1/2 (one-half) tablet by mouth twice daily 90 tablet 0    Multiple Vitamin (MULTI VITAMIN PO) Take  by mouth Daily.      omeprazole (priLOSEC) 20 MG capsule Take 1 capsule by mouth Daily. 30 minutes prior to a meal 90 capsule 3    potassium chloride (KLOR-CON M20) 20 MEQ CR tablet Take 1 tablet by mouth once daily 90 tablet 0    predniSONE (DELTASONE) 10 MG tablet Take 1 tablet by mouth Daily. 90 tablet 3    pyridostigmine (MESTINON) 60 MG tablet Take 1 tablet by mouth 3 (Three) Times a Day. 90 tablet 11    ranolazine (Ranexa) 1000 MG 12 hr tablet Take 1 tablet by mouth 2 (Two) Times a Day. 180 tablet 3    sucralfate (CARAFATE) 1 g tablet TAKE 1 TABLET BY MOUTH TWICE DAILY WITH MEALS 60 tablet 5    tamsulosin (FLOMAX) 0.4 MG capsule 24 hr capsule Take 1 capsule by mouth once daily 90 capsule 0    venlafaxine XR (EFFEXOR-XR) 150 MG 24 hr capsule Take 1 capsule by mouth once daily 90 capsule 1     No facility-administered medications prior to visit.     No opioid medication identified on active medication list. I have reviewed chart for other potential  high risk medication/s and harmful drug interactions in the elderly.      Aspirin is not on active medication list.  Aspirin use is not indicated based on review of current medical condition/s. Risk of harm outweighs potential benefits.  .    Patient Active Problem List   Diagnosis    Coronary artery disease    Dyslipidemia    Type 2 diabetes, onset 2001.  In 2016, A1c was 6.5.    Obstructive sleep apnea with C-PAP use.    Benign prostatic hypertrophy    Anxiety    Osteoarthritis    First degree atrioventricular block    Pleural effusion    Enlarged heart    Benign essential HTN    Abrasion of face    Paroxysmal atrial fibrillation    Myasthenia gravis without exacerbation    Abdominal pain    Dyspnea on  "exertion    History of cardiac arrhythmia    History of tonsillectomy    Hyperlipidemia    Hypertensive disorder    Nausea    Pleural effusion     Advance Care Planning Advance Directive is not on file.  ACP discussion was held with the patient during this visit. Patient does not have an advance directive, information provided.            Objective   Vitals:    24 1513   BP: 130/70   Pulse: 76   SpO2: 98%   Weight: 112 kg (246 lb)   Height: 188 cm (74\")       Estimated body mass index is 31.58 kg/m² as calculated from the following:    Height as of this encounter: 188 cm (74\").    Weight as of this encounter: 112 kg (246 lb).    BMI is >= 30 and <35. (Class 1 Obesity). The following options were offered after discussion;: exercise counseling/recommendations and nutrition counseling/recommendations       Does the patient have evidence of cognitive impairment? No  Lab Results   Component Value Date    HGBA1C 6.8 (A) 2024                                                                                                Health  Risk Assessment    Smoking Status:  Social History     Tobacco Use   Smoking Status Never    Passive exposure: Never   Smokeless Tobacco Never     Alcohol Consumption:  Social History     Substance and Sexual Activity   Alcohol Use Not Currently       Fall Risk Screen  STEADI Fall Risk Assessment was completed, and patient is at MODERATE risk for falls. Assessment completed on:9/3/2024    Depression Screenin/3/2024     3:14 PM   PHQ-2/PHQ-9 Depression Screening   Little Interest or Pleasure in Doing Things 0-->not at all   Feeling Down, Depressed or Hopeless 0-->not at all   PHQ-9: Brief Depression Severity Measure Score 0     Health Habits and Functional and Cognitive Screenin/3/2024     3:14 PM   Functional & Cognitive Status   Do you have difficulty preparing food and eating? No   Do you have difficulty bathing yourself, getting dressed or grooming yourself? No   Do " you have difficulty using the toilet? No   Do you have difficulty moving around from place to place? No   Do you have trouble with steps or getting out of a bed or a chair? Yes   Current Diet Well Balanced Diet   Dental Exam Not up to date   Eye Exam Up to date   Exercise (times per week) 0 times per week   Current Exercises Include No Regular Exercise   Do you need help using the phone?  No   Are you deaf or do you have serious difficulty hearing?  Yes   Do you need help to go to places out of walking distance? No   Do you need help shopping? No   Do you need help preparing meals?  No   Do you need help with housework?  No   Do you need help with laundry? No   Do you need help taking your medications? No   Do you need help managing money? No   Do you ever drive or ride in a car without wearing a seat belt? Yes   Have you felt unusual stress, anger or loneliness in the last month? No   Who do you live with? Spouse   If you need help, do you have trouble finding someone available to you? No   Have you been bothered in the last four weeks by sexual problems? No   Do you have difficulty concentrating, remembering or making decisions? Yes           Age-appropriate Screening Schedule:  Refer to the list below for future screening recommendations based on patient's age, sex and/or medical conditions. Orders for these recommended tests are listed in the plan section. The patient has been provided with a written plan.    Health Maintenance List  Health Maintenance   Topic Date Due    URINE MICROALBUMIN  Never done    TDAP/TD VACCINES (1 - Tdap) Never done    ZOSTER VACCINE (1 of 2) Never done    RSV Vaccine - Adults (1 - 1-dose 60+ series) Never done    ANNUAL WELLNESS VISIT  11/19/2021    COVID-19 Vaccine (2 - 2023-24 season) 09/01/2024    INFLUENZA VACCINE  08/01/2024    HEMOGLOBIN A1C  03/03/2025    LIPID PANEL  03/13/2025    DIABETIC EYE EXAM  07/03/2025    BMI FOLLOWUP  09/03/2025    Pneumococcal Vaccine 65+  Completed                                                                                                                                                 CMS Preventative Services Quick Reference  Risk Factors Identified During Encounter  None Identified    The above risks/problems have been discussed with the patient.  Pertinent information has been shared with the patient in the After Visit Summary.  An After Visit Summary and PPPS were made available to the patient.    Follow Up:   Next Medicare Wellness visit to be scheduled in 1 year.         Additional E&M Note during same encounter follows:  Patient has additional, significant, and separately identifiable condition(s)/problem(s) that require work above and beyond the Medicare Wellness Visit     Chief Complaint  Annual Exam    Subjective    HPI  Kayce is also being seen today for additional medical problem/s.       The patient is an 89-year-old gentleman here for a recheck and wellness exam. He is accompanied by his wife.    He experienced severe abdominal pain yesterday, initially suspecting appendicitis. The pain has since subsided, and he now believes it was due to gas, as he does not suffer from constipation. He also mentions taking a medication prescribed by his gastroenterologist 30 minutes before meals, which often results in stomach discomfort.  Does get constipated.    He reports feeling weak and fatigued, particularly in the mornings. His sleep pattern involves going to bed around 10 PM, waking up between 2 and 3 AM to use the bathroom, and then sleeping until 7 or 8 AM. He has been using a CPAP machine for several years and recently had his mask replaced, which has improved his sleep quality.    He has noticed some red spots on his face and a dry, scaly patch under his ear, which have not responded to freezing treatment. He has not consulted a dermatologist about these skin issues.    He reports difficulty bending over and getting up from a kneeling  "position, attributing this to weakness rather than pain. He is not experiencing shortness of breath.    He has been monitoring his blood sugar levels at home and notes that they were high in August 2024 but have since decreased.     He has been taking Eliquis 5 mg, half a tablet in the morning and half at night, and requests a refill of this medication. His cardiologist increased his dose of another medication from 5 to 10 mg, which has alleviated his chest discomfort. However, he has been unable to increase his dose of Lasix as advised by his doctor due to frequent urination.    He went to an ENT specialist and got new hearing aids, which are working well for him.    Review of Systems   Constitutional:  Positive for fatigue.   HENT:  Negative for congestion.    Respiratory:  Negative for apnea, cough and shortness of breath.    Cardiovascular:  Negative for chest pain.   Gastrointestinal:  Negative for abdominal pain.   Musculoskeletal:  Positive for arthralgias.   Neurological:  Negative for dizziness.   Psychiatric/Behavioral:  The patient is not nervous/anxious.           Objective   Vital Signs:  /70   Pulse 76   Ht 188 cm (74\")   Wt 112 kg (246 lb)   SpO2 98%   BMI 31.58 kg/m²   Physical Exam  Vitals and nursing note reviewed.   Constitutional:       General: He is not in acute distress.     Appearance: Normal appearance. He is not ill-appearing.   HENT:      Head: Normocephalic and atraumatic.      Right Ear: Tympanic membrane and ear canal normal.      Left Ear: Tympanic membrane and ear canal normal.      Nose: Nose normal.   Cardiovascular:      Rate and Rhythm: Normal rate and regular rhythm.      Heart sounds: Normal heart sounds.   Pulmonary:      Effort: Pulmonary effort is normal.      Breath sounds: Normal breath sounds.   Neurological:      Mental Status: He is alert and oriented to person, place, and time. Mental status is at baseline.   Psychiatric:         Mood and Affect: Mood normal. "               The following data was reviewed by: Mike Bhardwaj MD on 09/03/2024:         Assessment and Plan Additional age appropriate preventative wellness advice topics were discussed during today's preventative wellness exam(some topics already addressed during AWV portion of the note above):    Physical Activity: Advised cardiovascular activity 150 minutes per week as tolerated. (example brisk walk for 30 minutes, 5 days a week).     Nutrition: Discussed nutrition plan with patient. Information shared in after visit summary. Goal is for a well balanced diet to enhance overall health.     Healthy Weight: Discussed current and goal BMI with patient. Steps to attain this goal discussed. Information shared in after visit summary.          1. Wellness Exam.  His A1c levels have shown improvement since the last reading in March 2024, with a current average of 6.8 compared to the previous 8.7. His blood pressure readings are within the normal range. He has experienced a weight loss of approximately 6 pounds since May 2024. His blood glucose levels have been well-managed, with home monitoring showing an average of 135 to 145, a significant improvement from the previous average of 175. A comprehensive blood workup will be ordered to assess kidney function, liver function, blood glucose levels, cholesterol levels, thyroid function, and complete blood count. Urinalysis will also be performed to check for proteinuria. He is advised to maintain an earlier bedtime to improve sleep quality.    2. Skin Lesions.  The presence of red spots on his face and a dry, scaly patch under his ear, unresponsive to freezing treatment, necessitates further evaluation by a dermatologist. A referral to a dermatologist will be made for further evaluation of the skin lesions.    3. Medication Management.  A refill of Eliquis will be provided. Samples of Eliquis will be given if available to avoid the cost of a prescription.            Medicare annual wellness visit, subsequent  Updated annual wellness visit checklist.  Immunizations discussed.  Screening up-to-date.  Recommend yearly dental and eye exams. Also discussed monitoring of blood pressure and lipids. We addressed patient self-assessment of health status, frailty, and physical functioning. We reviewed psychosocial risks, behavioral risks, instrumental activities of daily living, and patient health risk assessment. Patient was given a personalized prevention plan.   Neoplasm of uncertain behavior of skin    Benign essential HTN  Hypertension is stable and controlled  Continue current treatment regimen.  Blood pressure will be reassessed in 6 months.  Vitamin D deficiency    Type 2 diabetes mellitus with hyperglycemia, without long-term current use of insulin  Diabetes is stable.   Continue current treatment regimen.  Diabetes will be reassessed in 6 months  Coronary artery disease involving native coronary artery of native heart without angina pectoris  Coronary Artery Disease (OPTIONAL): Coronary artery disease is stable.  Continue current treatment regimen.  Cardiac status will be reassessed in 6 months.  Dyslipidemia    Paroxysmal atrial fibrillation    Benign prostatic hyperplasia, unspecified whether lower urinary tract symptoms present    Osteoarthritis of other site, unspecified osteoarthritis type    Myasthenia gravis without exacerbation    Obstructive sleep apnea with C-PAP use.    Arthralgia, unspecified joint      Orders Placed This Encounter   Procedures    Comprehensive Metabolic Panel     Order Specific Question:   Release to patient     Answer:   Routine Release [[id number]]    Lipid Panel     Order Specific Question:   Release to patient     Answer:   Routine Release [[id number]]    TSH     Order Specific Question:   Release to patient     Answer:   Routine Release [[id number]]    Vitamin D 25 hydroxy     Order Specific Question:   Release to patient     Answer:    Routine Release [[id number]]    Microalbumin / Creatinine Urine Ratio - Urine, Clean Catch     Order Specific Question:   Release to patient     Answer:   Routine Release [[id number]]    Ambulatory Referral to Dermatology     Referral Priority:   Routine     Referral Type:   Consultation     Referral Reason:   Service Not Available In-House     [facility] Location:   [facility]     Requested Specialty:   Dermatology     Number of Visits Requested:   1    POC Glycosylated Hemoglobin (Hb A1C)     Order Specific Question:   Release to patient     Answer:   Routine Release [[id number]]    CBC & Differential     Order Specific Question:   Manual Differential     Answer:   No     Order Specific Question:   Release to patient     Answer:   Routine Release [[id number]]             Follow Up   No follow-ups on file.  Patient was given instructions and counseling regarding his condition or for health maintenance advice. Please see specific information pulled into the AVS if appropriate.  Patient or patient representative verbalized consent for the use of Ambient Listening during the visit with  Mike Bhardwaj MD for chart documentation. 9/4/2024  07:24 EDT

## 2024-09-23 RX ORDER — METOPROLOL TARTRATE 25 MG/1
12.5 TABLET, FILM COATED ORAL EVERY 12 HOURS SCHEDULED
Qty: 90 TABLET | Refills: 0 | Status: SHIPPED | OUTPATIENT
Start: 2024-09-23

## 2024-09-26 ENCOUNTER — CLINICAL SUPPORT (OUTPATIENT)
Dept: FAMILY MEDICINE CLINIC | Facility: CLINIC | Age: 89
End: 2024-09-26
Payer: MEDICARE

## 2024-09-26 DIAGNOSIS — Z23 IMMUNIZATION DUE: Primary | ICD-10-CM

## 2024-09-26 PROCEDURE — 90662 IIV NO PRSV INCREASED AG IM: CPT | Performed by: FAMILY MEDICINE

## 2024-09-26 PROCEDURE — G0008 ADMIN INFLUENZA VIRUS VAC: HCPCS | Performed by: FAMILY MEDICINE

## 2024-10-07 ENCOUNTER — TELEPHONE (OUTPATIENT)
Dept: CARDIOLOGY | Facility: CLINIC | Age: 89
End: 2024-10-07
Payer: MEDICARE

## 2024-10-16 ENCOUNTER — OFFICE VISIT (OUTPATIENT)
Dept: GASTROENTEROLOGY | Facility: CLINIC | Age: 89
End: 2024-10-16
Payer: MEDICARE

## 2024-10-16 VITALS
HEIGHT: 73 IN | DIASTOLIC BLOOD PRESSURE: 60 MMHG | WEIGHT: 253 LBS | SYSTOLIC BLOOD PRESSURE: 100 MMHG | BODY MASS INDEX: 33.53 KG/M2

## 2024-10-16 DIAGNOSIS — K76.0 FATTY INFILTRATION OF LIVER: ICD-10-CM

## 2024-10-16 DIAGNOSIS — R11.0 NAUSEA: Primary | ICD-10-CM

## 2024-10-16 DIAGNOSIS — E66.811 CLASS 1 OBESITY WITH SERIOUS COMORBIDITY AND BODY MASS INDEX (BMI) OF 33.0 TO 33.9 IN ADULT, UNSPECIFIED OBESITY TYPE: ICD-10-CM

## 2024-10-16 DIAGNOSIS — R10.9 ABDOMINAL PAIN, UNSPECIFIED ABDOMINAL LOCATION: ICD-10-CM

## 2024-10-16 RX ORDER — IPRATROPIUM BROMIDE 42 UG/1
SPRAY, METERED NASAL
COMMUNITY
Start: 2024-10-08

## 2024-10-16 NOTE — PROGRESS NOTES
GASTROENTEROLOGY OFFICE NOTE    Arie Fournier  [id number]  1935    CARE TEAM  Patient Care Team:  Mike Bhardwaj MD as PCP - General  Mike Bhardwaj MD as PCP - Family Medicine  Arie Fatima MD as Consulting Physician (Cardiology)  Arie Lester MD as Consulting Physician (Neurology)  Jonn Dickens MD as Consulting Physician (Cardiology)    Referring Provider: No ref. provider found    Chief Complaint   Patient presents with    Epighastric pain     5mth followup        HISTORY OF PRESENT ILLNESS:   Arie Fournier is a 89 y.o. male Who returns for follow-up regarding mid to upper abdominal discomfort, nausea previously reported as aggravated by hunger/empty stomach.  He previously described worsening symptoms with intake of coffee.  Previously reported mixing tea and cola together with discontinuation of cola with possible improvement.  He was previously prescribed Carafate with possible improvement.  He previously reported chest discomfort with large swallow of water.  He previously reported intermittent gas.      At last office visit, recommended he complete current bottle of sucralfate then I suggested trial off sucralfate, start omeprazole 20 mg daily, recommended eating schedule around 8 AM, noon or 1, 5 or 6 PM as it seemed as though he felt worse when hungry.  Recommended decreased intake of milk/dairy and increased intake of protein.  I expressed concern regarding fluctuating glucose and hyperglycemia contributing.  Lifestyle modifications for reflux provided.  Ultrasound abdomen ordered for additional evaluation.  Recommended Gas-X as needed.    7/10/2024 ultrasound abdomen revealed mild diffuse hepatic steatosis and otherwise appeared normal.  Patient had normal liver enzymes at time of comprehensive metabolic panel 3/13/2024, I suggested weight loss.    His weight at office visit on 5/31/2024 was 251 pounds.  Today's weight 253 pounds.    He is accompanied by his wife  and daughter.  There is expressed concern about his eating habits with report that he wakes up and has a snack (mentioned milk, peanut butter and cracker) around 11 or 12 PM.    He continues to have intermittent symptoms.  When experiencing abdominal discomfort, nausea he reports symptoms are better for 2 to 3 minutes and then spontaneously resolve.    It seems as though having a snack improved symptoms.    He continues to experience increased gas.  He reports 2 bowel movements most days.    We have previously discussed for patient and/or family to determine if provider that prescribes Eliquis would be okay if patient stopped Eliquis for consideration of EGD if continued symptoms.  It seems as though patient was informed he could stop Eliquis 2 days prior to the procedure but his wife reported patient is too old to undergo EGD.    Past Medical History:   Diagnosis Date    Anxiety     Benign essential hypertension     Benign prostatic hyperplasia     WITH URINARY FREQUENCY    Benign prostatic hypertrophy     Coronary artery disease     Depressive disorder     DJD (degenerative joint disease)     INVOLVING MULTIPLE JOINTS    Dyslipidemia     In March of 2016:  Total cholesterol 137, triglycerides 180, HDL 38, LDL 63.    First degree atrioventricular block     Hyperglycemia     Injury from a fall in 2011     Mixed hyperlipidemia due to type 2 diabetes mellitus     Myasthenia gravis     Obesity     Obstructive sleep apnea with C-PAP use.     Osteoarthritis     Primary osteoarthritis involving multiple joints     Recurrent major depressive episodes, moderate     Type 2 diabetes mellitus     WITHOUT HYPERGLYCEMIA WITHOUT LONG TERM CURRENT USE OF INSULIN    Type 2 diabetes, onset 2001.  In 2016, A1c was 6.5.         Past Surgical History:   Procedure Laterality Date    CYST REMOVAL  1976    BACK    HAND SURGERY      TONSILLECTOMY      TUMOR EXCISION      skin excision for tumor        Current Outpatient Medications on File  Prior to Visit   Medication Sig    acetaminophen (TYLENOL) 325 MG tablet Take 2 tablets by mouth As Needed for Mild Pain.    apixaban (ELIQUIS) 2.5 MG tablet tablet Take 1 tablet by mouth 2 (Two) Times a Day.    ascorbic acid (VITAMIN C) 1000 MG tablet Take 1 tablet by mouth Daily.    cetirizine (ZyrTEC) 10 MG tablet Take 1 tablet by mouth Daily.    coenzyme Q10 100 MG capsule Take 2 capsules by mouth Daily.    empagliflozin (JARDIANCE) 25 MG tablet tablet Take 1 tablet by mouth Daily.    finasteride (PROSCAR) 5 MG tablet Take 1 tablet by mouth once daily    furosemide (LASIX) 20 MG tablet Take 1 tablet by mouth Daily.    ipratropium (ATROVENT) 0.06 % nasal spray Administer  into the nostril(s) as directed by provider.    Loratadine 10 MG capsule Take 1 capsule by mouth Daily.    metFORMIN (GLUCOPHAGE) 1000 MG tablet TAKE 1 TABLET BY MOUTH TWICE DAILY WITH MORNING MEAL AND WITH EVENING MEAL    methylcellulose, Laxative, (CITRUCEL) 500 MG tablet tablet Take 2 tablets by mouth As Needed.    metoprolol tartrate (LOPRESSOR) 25 MG tablet Take 1/2 (one-half) tablet by mouth twice daily    Multiple Vitamin (MULTI VITAMIN PO) Take  by mouth Daily.    omeprazole (priLOSEC) 20 MG capsule Take 1 capsule by mouth Daily. 30 minutes prior to a meal    potassium chloride (KLOR-CON M20) 20 MEQ CR tablet Take 1 tablet by mouth once daily    predniSONE (DELTASONE) 10 MG tablet Take 1 tablet by mouth Daily.    pyridostigmine (MESTINON) 60 MG tablet Take 1 tablet by mouth 3 (Three) Times a Day.    ranolazine (Ranexa) 1000 MG 12 hr tablet Take 1 tablet by mouth 2 (Two) Times a Day.    sucralfate (CARAFATE) 1 g tablet TAKE 1 TABLET BY MOUTH TWICE DAILY WITH MEALS    tamsulosin (FLOMAX) 0.4 MG capsule 24 hr capsule Take 1 capsule by mouth once daily    venlafaxine XR (EFFEXOR-XR) 150 MG 24 hr capsule Take 1 capsule by mouth once daily     No current facility-administered medications on file prior to visit.       Allergies   Allergen  "Reactions    Other Unknown - Low Severity     Horse Serum    Tetanus Immune Globulin Unknown - Low Severity       Family History   Problem Relation Age of Onset    Stroke Mother     Hypertension Mother     Stroke Father     Hypertension Father     Diabetes Brother     Hypertension Brother     Prostate cancer Brother     Hyperlipidemia Brother        Social History     Socioeconomic History    Marital status:    Tobacco Use    Smoking status: Never     Passive exposure: Never    Smokeless tobacco: Never   Vaping Use    Vaping status: Never Used   Substance and Sexual Activity    Alcohol use: Not Currently    Drug use: Never    Sexual activity: Defer       PHYSICAL EXAM   /60 (BP Location: Left arm, Patient Position: Sitting, Cuff Size: Adult)   Ht 185.4 cm (73\")   Wt 115 kg (253 lb)   BMI 33.38 kg/m²   Physical Exam  Constitutional:       General: He is not in acute distress.     Appearance: He is not toxic-appearing.   HENT:      Head: Normocephalic and atraumatic. No contusion.      Right Ear: External ear normal.      Left Ear: External ear normal.   Eyes:      General: Lids are normal.      Extraocular Movements: Extraocular movements intact.      Comments: Right eye draining clear fluid   Neck:      Trachea: Trachea normal.      Comments: No visible mass  No visible adenopathy  Pulmonary:      Comments: Symmetrical expansion  Somewhat short of breath when walking  Musculoskeletal:      Comments: Symmetrical movement of upper extremities     Skin:     General: Skin is warm and dry.      Coloration: Skin is not jaundiced.   Neurological:      General: No focal deficit present.      Mental Status: He is alert and oriented to person, place, and time.   Psychiatric:         Mood and Affect: Mood normal.         Behavior: Behavior normal.         Thought Content: Thought content normal.     Results Review:  7/10/2024 ultrasound abdomen revealed mild diffuse hepatic steatosis and otherwise appeared " normal.   CMP          3/13/2024    12:08 9/3/2024    16:10   CMP   Glucose 213  136    BUN 25  25    Creatinine 0.89  1.05    Sodium 142  139    Potassium 4.1  4.8    Chloride 98  97    Calcium 9.5  9.5    Total Protein 6.8  6.5    Albumin 4.5  4.5    Globulin 2.3  2.0    Total Bilirubin 0.7  0.6    Alkaline Phosphatase 79  60    AST (SGOT) 24  16    ALT (SGPT) 27  18    BUN/Creatinine Ratio 28  24      CBC          3/13/2024    12:08 9/3/2024    16:10   CBC   WBC 8.0  7.9    RBC 5.60  5.21    Hemoglobin 17.2  16.8    Hematocrit 52.0  50.2    MCV 93  96    MCH 30.7  32.2    MCHC 33.1  33.5    RDW 13.2  13.8    Platelets 210  210        ASSESSMENT / PLAN  1. Nausea  2. Abdominal pain, unspecified abdominal location  -History of taking sucralfate with possible improvement in symptoms  -Continue omeprazole daily in the event reflux or functional dyspepsia is contributing to symptoms  -Recommend eating 30 to 40 g of protein 3 times daily around 8 AM, noon or 1 PM and 5 or 6 PM as it seems as though symptoms were previously aggravated by being hungry and I would like for him to work on weight loss (increased intake of protein should be helpful)  -Previously suggested he try to decrease milk/dairy intake  I previously documented concern regarding fluctuating glucose and hyperglycemia contributing to symptoms for which I recommended improvement in diabetes  -It seems as though patient could stop Eliquis 2 days before consideration of EGD for additional evaluation but his wife expressed concern regarding EGD due to age  -Lifestyle modifications for reflux previously provided  -Recommended he continue fiber supplement to try to achieve 1-2 productive bowel movements per day  -Ultrasound 7/10/2024 revealed mild diffuse hepatic steatosis and otherwise appeared normal  -Continue Gas-X as needed  -He has prescribed pyridostigmine for myasthenia gravis which could help bowel habits    3. Fatty infiltration of liver  4. Class 1  obesity with serious comorbidity and body mass index (BMI) of 33.0 to 33.9 in adult, unspecified obesity type  7/10/2024 ultrasound abdomen revealed mild diffuse hepatic steatosis and otherwise appeared normal.   -Liver enzymes normal 9/3/2024 as above  -Previously recommended weight loss when result of ultrasound was reviewed  -Recommend patient try to increase intake of protein to 30 to 40 g 3 times daily (gave examples of Greek yogurt with breakfast, protein drink supplements)  -Consider intermittently checking CBC to evaluate platelet count (platelet count less than 120 is concerning for advanced liver disease); CMP to evaluate liver enzymes and albumin (hypoalbuminemia is concerning for advanced liver disease); INR (if INR is prolonged this may raise suspicion for advancing liver disease/fibrosis but he takes Eliquis which may prolong INR)    Return in about 6 months (around 4/16/2025).    Karen Cuello, TAMERA  10/16/2024

## 2024-10-21 RX ORDER — FUROSEMIDE 20 MG
20 TABLET ORAL DAILY
Qty: 90 TABLET | Refills: 0 | Status: SHIPPED | OUTPATIENT
Start: 2024-10-21

## 2024-10-23 ENCOUNTER — OFFICE VISIT (OUTPATIENT)
Dept: NEUROLOGY | Facility: CLINIC | Age: 89
End: 2024-10-23
Payer: MEDICARE

## 2024-10-23 VITALS
SYSTOLIC BLOOD PRESSURE: 114 MMHG | HEART RATE: 91 BPM | DIASTOLIC BLOOD PRESSURE: 64 MMHG | BODY MASS INDEX: 33.6 KG/M2 | OXYGEN SATURATION: 95 % | WEIGHT: 253.53 LBS | HEIGHT: 73 IN

## 2024-10-23 DIAGNOSIS — G70.00 MYASTHENIA GRAVIS WITHOUT EXACERBATION: Primary | Chronic | ICD-10-CM

## 2024-10-23 PROBLEM — J90 PLEURAL EFFUSION: Status: RESOLVED | Noted: 2022-04-21 | Resolved: 2024-10-23

## 2024-10-23 PROCEDURE — 1160F RVW MEDS BY RX/DR IN RCRD: CPT | Performed by: PSYCHIATRY & NEUROLOGY

## 2024-10-23 PROCEDURE — 99213 OFFICE O/P EST LOW 20 MIN: CPT | Performed by: PSYCHIATRY & NEUROLOGY

## 2024-10-23 PROCEDURE — 1159F MED LIST DOCD IN RCRD: CPT | Performed by: PSYCHIATRY & NEUROLOGY

## 2024-10-23 RX ORDER — PREDNISONE 10 MG/1
10 TABLET ORAL DAILY
Qty: 90 TABLET | Refills: 3 | Status: SHIPPED | OUTPATIENT
Start: 2024-10-23 | End: 2025-10-23

## 2024-10-23 RX ORDER — PYRIDOSTIGMINE BROMIDE 60 MG/1
60 TABLET ORAL 3 TIMES DAILY
Qty: 270 TABLET | Refills: 3 | Status: SHIPPED | OUTPATIENT
Start: 2024-10-23 | End: 2025-10-23

## 2024-10-23 RX ORDER — RANOLAZINE 500 MG/1
1 TABLET, EXTENDED RELEASE ORAL EVERY 12 HOURS SCHEDULED
COMMUNITY
Start: 2024-10-18

## 2024-10-23 NOTE — PROGRESS NOTES
"Chief Complaint    Myasthenia gravis without exacerbation    Subjective        Arie Fournier presents to [facility] NEUROLOGY  History of Present Illness    89 y.o. male returns in follow up.  Last visit on 3/28/24 continued Prednisone and mestinon.       Feels weak in the morning.        Proximal LE weakness.       No longer having issues clearing secretions.  Proximal arm and legs giving out.        Diplopia is rare.       Denies choking, trouble chewing.       SOB exertion.       Always uses arms to get out of chair.  Rests arms when shaving.      Mestinon improving sx.      MG ADL 5     Reviewed medical records:     Onset of Diplopia 7/4/23.       MRI Brain, my review of films, 7/4/23 moderate atrophy.       MUSK negative     AChR binding 6.3, blocking 55, modulating 57        Objective   Vital Signs:  /64   Pulse 91   Ht 185.4 cm (72.99\")   Wt 115 kg (253 lb 8.5 oz)   SpO2 95%   BMI 33.46 kg/m²   Estimated body mass index is 33.46 kg/m² as calculated from the following:    Height as of this encounter: 185.4 cm (72.99\").    Weight as of this encounter: 115 kg (253 lb 8.5 oz).    Neurological Exam  Mental Status  Awake, alert and oriented to person, place and time. Oriented to person, place and time. Speech is normal. Language is fluent with no aphasia. Attention and concentration are normal. Fund of knowledge is appropriate for level of education.    Cranial Nerves  CN III, IV, VI: Extraocular movements intact bilaterally. Pupils equal round and reactive to light bilaterally.  CN V: Facial sensation is normal.  CN VII: Full and symmetric facial movement.  CN IX, X: Palate elevates symmetrically  CN XI: Shoulder shrug strength is normal.  CN XII: Tongue midline without atrophy or fasciculations.    Motor                                               Right                     Left  Hip flexion                              4                          4  Hip extension               "           4                          4    Sensory  Sensation is intact to light touch, pinprick, vibration and proprioception in all four extremities.    Reflexes  Deep tendon reflexes are 2+ and symmetric in all four extremities.    Coordination    Finger-to-nose, rapid alternating movements and heel-to-shin normal bilaterally without dysmetria.    Gait  Normal casual, toe, heel and tandem gait.         Physical Exam  Eyes:      Extraocular Movements: Extraocular movements intact.      Pupils: Pupils are equal, round, and reactive to light.   Neurological:      Coordination: Coordination is intact.      Deep Tendon Reflexes: Reflexes are normal and symmetric.   Psychiatric:         Speech: Speech normal.        Result Review :  The following data was reviewed by: Arie Lester MD on 10/23/2024:  Common labs          3/13/2024    12:08 9/3/2024    16:10 9/3/2024    16:14 9/3/2024    16:30   Common Labs   Glucose 213  136      BUN 25  25      Creatinine 0.89  1.05      Sodium 142  139      Potassium 4.1  4.8      Chloride 98  97      Calcium 9.5  9.5      Total Protein 6.8  6.5      Albumin 4.5  4.5      Total Bilirubin 0.7  0.6      Alkaline Phosphatase 79  60      AST (SGOT) 24  16      ALT (SGPT) 27  18      WBC 8.0  7.9      Hemoglobin 17.2  16.8      Hematocrit 52.0  50.2      Platelets 210  210      Total Cholesterol 222  212      Triglycerides 279  246      HDL Cholesterol 52  52      LDL Cholesterol  121  117      Hemoglobin A1C 8.7   6.8     Microalbumin, Urine    <3.0                Assessment and Plan   Diagnoses and all orders for this visit:    1. Myasthenia gravis without exacerbation (Primary)             Follow Up   No follow-ups on file.  Patient was given instructions and counseling regarding his condition or for health maintenance advice. Please see specific information pulled into the AVS if appropriate.

## 2024-10-28 RX ORDER — POTASSIUM CHLORIDE 1500 MG/1
TABLET, EXTENDED RELEASE ORAL DAILY
Qty: 90 TABLET | Refills: 0 | Status: SHIPPED | OUTPATIENT
Start: 2024-10-28

## 2024-11-02 DIAGNOSIS — N40.0 BENIGN PROSTATIC HYPERPLASIA, UNSPECIFIED WHETHER LOWER URINARY TRACT SYMPTOMS PRESENT: ICD-10-CM

## 2024-11-04 RX ORDER — TAMSULOSIN HYDROCHLORIDE 0.4 MG/1
1 CAPSULE ORAL DAILY
Qty: 90 CAPSULE | Refills: 0 | Status: SHIPPED | OUTPATIENT
Start: 2024-11-04

## 2024-11-04 RX ORDER — FINASTERIDE 5 MG/1
5 TABLET, FILM COATED ORAL DAILY
Qty: 90 TABLET | Refills: 0 | Status: SHIPPED | OUTPATIENT
Start: 2024-11-04

## 2024-11-17 DIAGNOSIS — F32.A DEPRESSION, UNSPECIFIED DEPRESSION TYPE: ICD-10-CM

## 2024-11-19 RX ORDER — VENLAFAXINE HYDROCHLORIDE 150 MG/1
150 CAPSULE, EXTENDED RELEASE ORAL DAILY
Qty: 90 CAPSULE | Refills: 0 | Status: SHIPPED | OUTPATIENT
Start: 2024-11-19

## 2024-11-24 DIAGNOSIS — F32.A DEPRESSION, UNSPECIFIED DEPRESSION TYPE: ICD-10-CM

## 2024-11-25 RX ORDER — VENLAFAXINE HYDROCHLORIDE 150 MG/1
150 CAPSULE, EXTENDED RELEASE ORAL DAILY
Qty: 90 CAPSULE | Refills: 0 | OUTPATIENT
Start: 2024-11-25

## 2024-12-16 ENCOUNTER — OFFICE VISIT (OUTPATIENT)
Dept: FAMILY MEDICINE CLINIC | Facility: CLINIC | Age: 89
End: 2024-12-16
Payer: MEDICARE

## 2024-12-16 VITALS
HEIGHT: 73 IN | WEIGHT: 246.7 LBS | SYSTOLIC BLOOD PRESSURE: 128 MMHG | DIASTOLIC BLOOD PRESSURE: 68 MMHG | BODY MASS INDEX: 32.7 KG/M2

## 2024-12-16 DIAGNOSIS — E11.65 TYPE 2 DIABETES MELLITUS WITH HYPERGLYCEMIA, WITHOUT LONG-TERM CURRENT USE OF INSULIN: ICD-10-CM

## 2024-12-16 DIAGNOSIS — N32.81 OVERACTIVE BLADDER: ICD-10-CM

## 2024-12-16 DIAGNOSIS — I10 BENIGN ESSENTIAL HTN: ICD-10-CM

## 2024-12-16 DIAGNOSIS — L57.0 ACTINIC KERATOSIS: Primary | ICD-10-CM

## 2024-12-16 DIAGNOSIS — F51.01 PRIMARY INSOMNIA: ICD-10-CM

## 2024-12-16 PROCEDURE — G2211 COMPLEX E/M VISIT ADD ON: HCPCS | Performed by: FAMILY MEDICINE

## 2024-12-16 PROCEDURE — 99214 OFFICE O/P EST MOD 30 MIN: CPT | Performed by: FAMILY MEDICINE

## 2024-12-16 PROCEDURE — 1126F AMNT PAIN NOTED NONE PRSNT: CPT | Performed by: FAMILY MEDICINE

## 2024-12-16 NOTE — PROGRESS NOTES
Follow Up Office Visit      Date of Visit:  2024   Patient Name: Arie Fournier  : 1935   MRN: [id number]     Chief Complaint:    Chief Complaint   Patient presents with    spots on face       History of Present Illness: Arie Fournier is a 89 y.o. male who is here today for follow up.    History of Present Illness  The patient is an 89-year-old male who presents for evaluation of skin lesions, blood pressure management, blood glucose management, sleep apnea, leg pain, itching in the leg, chest pain, vitamin D deficiency, and overactive bladder.    He has been experiencing bleeding from a shaving cut, which was previously treated with cryotherapy by his dermatologist approximately 1 month ago. He has a follow-up appointment scheduled with her in February. He also reports the presence of a mole-like lesion on his skin, which he has attempted to remove himself. 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    He reports that his pulse rate was recorded as 95, 102, and 94 in November and December. It increased to 120, prompting a hospital visit where it subsequently decreased. His blood pressure remains stable, but he experiences fluctuations in his blood glucose levels.    He experiences heat sensations when lying down at night, which he attributes to his body heat being trapped between two flannel sheets. He maintains a home temperature of 73 degrees and uses a cover while sleeping. He also reports snoring.    He experiences weakness and pain in his leg upon waking, which he suspects may be due to a small clot. He also reports itching in his leg, which he manages by elevating his feet on a pillow during sleep.    He occasionally experiences transient chest pain, lasting only a few seconds. He consumes large quantities of milk and apple juice, which he believes may be causing stomach discomfort. He has been advised to consume 30 g of  protein daily, which he achieves by eating a 10 g protein bar for breakfast and including meat in his diet. He typically eats dinner around 11:00 PM and attempts to wake up early, although he often returns to bed after taking his morning medications. He experiences nausea if he takes his stomach medication on an empty stomach and is concerned about potential interactions between his stomach medication and blood thinner. He reports feeling weak and unmotivated.    He continues to take vitamin D supplements, 1000 units in the morning and 1000 units at night.    He experiences urinary incontinence if he does not immediately respond to the urge to urinate. He did not take his Lasix today due to this issue. He has been advised to increase his Lasix dosage due to mild chest pain, but he finds this challenging due to the frequency of urination it causes. He reports needing to urinate every 15 minutes after taking Lasix. He is currently on finasteride and tamsulosin for prostate management.    He struggles with falling asleep early at night and feels more tired in the morning. He is seeking a medication to help him relax and fall asleep earlier, as he currently falls asleep around 12:00 or 1:00 AM.    MEDICATIONS  Current: Finasteride, tamsulosin, Klonopin, Lasix, vitamin D.      Subjective      Review of Systems:   Review of Systems   Constitutional:  Positive for fatigue. Negative for fever.   HENT:  Negative for congestion and ear pain.    Respiratory:  Negative for apnea, cough, chest tightness and shortness of breath.    Cardiovascular:  Negative for chest pain.   Gastrointestinal:  Negative for abdominal pain, constipation, diarrhea and nausea.   Musculoskeletal:  Negative for arthralgias.   Skin:  Positive for skin lesions.   Psychiatric/Behavioral:  Negative for depressed mood and stress.        Past Medical History:   Past Medical History:   Diagnosis Date    Anxiety     Benign essential hypertension     Benign  prostatic hyperplasia     WITH URINARY FREQUENCY    Benign prostatic hypertrophy     Coronary artery disease     Depressive disorder     DJD (degenerative joint disease)     INVOLVING MULTIPLE JOINTS    Dyslipidemia     In March of 2016:  Total cholesterol 137, triglycerides 180, HDL 38, LDL 63.    First degree atrioventricular block     Hyperglycemia     Injury from a fall in 2011     Mixed hyperlipidemia due to type 2 diabetes mellitus     Myasthenia gravis     Obesity     Obstructive sleep apnea with C-PAP use.     Osteoarthritis     Primary osteoarthritis involving multiple joints     Recurrent major depressive episodes, moderate     Type 2 diabetes mellitus     WITHOUT HYPERGLYCEMIA WITHOUT LONG TERM CURRENT USE OF INSULIN    Type 2 diabetes, onset 2001.  In 2016, A1c was 6.5.        Past Surgical History:   Past Surgical History:   Procedure Laterality Date    CYST REMOVAL  1976    BACK    HAND SURGERY      TONSILLECTOMY      TUMOR EXCISION      skin excision for tumor       Family History:   Family History   Problem Relation Age of Onset    Stroke Mother     Hypertension Mother     Stroke Father     Hypertension Father     Diabetes Brother     Hypertension Brother     Prostate cancer Brother     Hyperlipidemia Brother        Social History:   Social History     Socioeconomic History    Marital status:    Tobacco Use    Smoking status: Never     Passive exposure: Never    Smokeless tobacco: Never   Vaping Use    Vaping status: Never Used   Substance and Sexual Activity    Alcohol use: Not Currently    Drug use: Never    Sexual activity: Defer       Medications:     Current Outpatient Medications:     acetaminophen (TYLENOL) 325 MG tablet, Take 2 tablets by mouth As Needed for Mild Pain., Disp: , Rfl:     apixaban (ELIQUIS) 2.5 MG tablet tablet, Take 1 tablet by mouth 2 (Two) Times a Day., Disp: 60 tablet, Rfl: 11    ascorbic acid (VITAMIN C) 1000 MG tablet, Take 1 tablet by mouth As Needed., Disp: ,  Rfl:     cetirizine (ZyrTEC) 10 MG tablet, Take 1 tablet by mouth As Needed., Disp: , Rfl:     coenzyme Q10 100 MG capsule, Take 2 capsules by mouth Daily., Disp: , Rfl:     empagliflozin (JARDIANCE) 25 MG tablet tablet, Take 1 tablet by mouth Daily., Disp: , Rfl:     finasteride (PROSCAR) 5 MG tablet, Take 1 tablet by mouth once daily, Disp: 90 tablet, Rfl: 0    furosemide (LASIX) 20 MG tablet, Take 1 tablet by mouth once daily, Disp: 90 tablet, Rfl: 0    ipratropium (ATROVENT) 0.06 % nasal spray, Administer  into the nostril(s) as directed by provider., Disp: , Rfl:     Loratadine 10 MG capsule, Take 1 capsule by mouth Daily., Disp: , Rfl:     metFORMIN (GLUCOPHAGE) 1000 MG tablet, TAKE 1 TABLET BY MOUTH TWICE DAILY WITH MORNING MEAL AND WITH EVENING MEAL, Disp: 180 tablet, Rfl: 0    methylcellulose, Laxative, (CITRUCEL) 500 MG tablet tablet, Take 2 tablets by mouth As Needed., Disp: , Rfl:     metoprolol tartrate (LOPRESSOR) 25 MG tablet, Take 1/2 (one-half) tablet by mouth twice daily, Disp: 90 tablet, Rfl: 0    Multiple Vitamin (MULTI VITAMIN PO), Take  by mouth Daily., Disp: , Rfl:     omeprazole (priLOSEC) 20 MG capsule, Take 1 capsule by mouth Daily. 30 minutes prior to a meal, Disp: 90 capsule, Rfl: 3    potassium chloride (KLOR-CON M20) 20 MEQ CR tablet, TAKE 1  BY MOUTH ONCE DAILY, Disp: 90 tablet, Rfl: 0    predniSONE (DELTASONE) 10 MG tablet, Take 1 tablet by mouth Daily., Disp: 90 tablet, Rfl: 3    pyridostigmine (MESTINON) 60 MG tablet, Take 1 tablet by mouth 3 (Three) Times a Day., Disp: 270 tablet, Rfl: 3    ranolazine (RANEXA) 500 MG 12 hr tablet, Take 1 tablet by mouth Every 12 (Twelve) Hours., Disp: , Rfl:     sucralfate (CARAFATE) 1 g tablet, TAKE 1 TABLET BY MOUTH TWICE DAILY WITH MEALS, Disp: 60 tablet, Rfl: 5    tamsulosin (FLOMAX) 0.4 MG capsule 24 hr capsule, Take 1 capsule by mouth once daily, Disp: 90 capsule, Rfl: 0    venlafaxine XR (EFFEXOR-XR) 150 MG 24 hr capsule, Take 1 capsule by  "mouth once daily, Disp: 90 capsule, Rfl: 0    Allergies:   Allergies   Allergen Reactions    Other Unknown - Low Severity     Horse Serum    Tetanus Immune Globulin Unknown - Low Severity       Objective     Physical Exam:  Vital Signs:   Vitals:    12/16/24 1123   BP: 128/68   BP Location: Left arm   Patient Position: Sitting   Cuff Size: Large Adult   Weight: 112 kg (246 lb 11.2 oz)   Height: 185.4 cm (73\")     Body mass index is 32.55 kg/m².     Physical Exam  Vitals and nursing note reviewed.   Constitutional:       General: He is not in acute distress.     Appearance: Normal appearance. He is not ill-appearing.   HENT:      Head: Normocephalic and atraumatic.      Right Ear: Tympanic membrane and ear canal normal.      Left Ear: Tympanic membrane and ear canal normal.      Nose: Nose normal.   Cardiovascular:      Rate and Rhythm: Normal rate and regular rhythm.      Heart sounds: Normal heart sounds.   Pulmonary:      Effort: Pulmonary effort is normal.      Breath sounds: Normal breath sounds.   Skin:     Findings: Lesion present.   Neurological:      Mental Status: He is alert and oriented to person, place, and time. Mental status is at baseline.   Psychiatric:         Mood and Affect: Mood normal.       Physical Exam  Vital Signs  Blood pressure is 128/68.    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          Results  Laboratory Studies  A1c decreased from 8.7 to 6.8. Liver tests were normal. Kidney tests were normal. Thyroid tests were normal. Vitamin D levels improved. No anemia detected. Cholesterol levels could be improved.  Assessment / Plan  "     Assessment/Plan:   There are no diagnoses linked to this encounter.   Assessment & Plan  1. Skin lesions.  He has experienced a weight loss of 7 pounds since October 2023. His blood pressure is well-regulated at 128/68. His A1c levels have significantly improved, decreasing from 8.7 to 6.8. Liver, kidney, and thyroid function tests are within normal limits. Vitamin D levels have shown improvement. There is no evidence of anemia. Cholesterol levels could benefit from further optimization. He has varicose veins, which can occasionally become irritated and cause itching. His pulse, blood pressure, and blood glucose levels are all within acceptable ranges. Cardiac evaluations conducted earlier this year yielded satisfactory results. Cryotherapy will be administered to the identified spots today. He is advised to continue his vitamin D supplementation, taking 1000 units in the morning and 1000 units at night. He is also advised to consume a small amount of food prior to taking his stomach medication to prevent nausea. He is reassured that his stomach medication will not interfere with his blood thinner. He is encouraged to avoid picking at his skin lesions.    2. Blood pressure management.  His blood pressure is well-regulated at 128/68.    3. Blood glucose management.  His A1c levels have significantly improved, decreasing from 8.7 to 6.8.    4. Sleep apnea.  He reports snoring, which could be a sign of sleep apnea and decreased oxygen levels, potentially causing him to feel hot at night.    5. Leg pain.  He experiences leg pain, which is likely due to age and arthritis.    6. Itching in the leg.  He has varicose veins, which can occasionally become irritated and cause itching.    7. Chest pain.  He experiences transient chest pain, which is not indicative of angina or heart-related pain.    8. Vitamin D deficiency.  His vitamin D levels have shown improvement. He is advised to continue his vitamin D  supplementation, taking 1000 units in the morning and 1000 units at night.    9. Overactive bladder.  He experiences urinary incontinence and frequency, likely due to an overactive bladder. He is currently on finasteride and tamsulosin for prostate management. A pill for bladder control will be considered if symptoms persist.    10. Insomnia.  He struggles with falling asleep early at night and feels more tired in the morning. He is advised to continue taking Klonopin as prescribed.    PROCEDURE  Cryotherapy was administered to the identified spots today.        Follow Up:   No follow-ups on file.    Mike Bhardwaj  [facility]     Patient or patient representative verbalized consent for the use of Ambient Listening during the visit with  Mike Bhardwaj MD for chart documentation. 12/16/2024  12:45 EST

## 2024-12-23 RX ORDER — METOPROLOL TARTRATE 25 MG/1
12.5 TABLET, FILM COATED ORAL EVERY 12 HOURS SCHEDULED
Qty: 90 TABLET | Refills: 0 | Status: SHIPPED | OUTPATIENT
Start: 2024-12-23

## 2025-01-07 ENCOUNTER — TELEPHONE (OUTPATIENT)
Dept: FAMILY MEDICINE CLINIC | Facility: CLINIC | Age: 89
End: 2025-01-07
Payer: MEDICARE

## 2025-01-07 RX ORDER — OXYBUTYNIN CHLORIDE 5 MG/1
5 TABLET, EXTENDED RELEASE ORAL DAILY
Qty: 30 TABLET | Refills: 2 | Status: SHIPPED | OUTPATIENT
Start: 2025-01-07

## 2025-01-07 NOTE — TELEPHONE ENCOUNTER
Caller: Stephanie Fournier    Relationship: Emergency Contact    Best call back number: [phone]     What was the call regarding: SAID PATIENT DROPPED OFF A PAPER FOR MEDICATION ASSISTANCE AND SHE WANTED TO SEE IF IT WAS FILLED OUT AND FAXED BACK. NOT ON OUR VERBAL BUT IS ON OTHER GROUPS VERBAL.     PLEASE CALL HER OR PATIENT TO DISCUSS

## 2025-01-07 NOTE — TELEPHONE ENCOUNTER
PATIENT LIKE THE GEMTESA SAMPLES GIVEN TO HIM BY KESHAWN AND WANTS A SCRIPT CALLED IN. HE SAID KESHAWN STATED HE COULD CALL IN SOMETHING LIKE THE MEDICATION SEND TO [facility].HE IS COMPLETELY OUT

## 2025-01-09 NOTE — TELEPHONE ENCOUNTER
Spoke with family. Indicated that paperwork for Jardiance patient assistance program is in PCP to-do list basket. Left samples of Jardiance at front office for .

## 2025-01-18 ENCOUNTER — PATIENT OUTREACH (OUTPATIENT)
Dept: CASE MANAGEMENT | Facility: OTHER | Age: 89
End: 2025-01-18
Payer: MEDICARE

## 2025-01-18 NOTE — OUTREACH NOTE
AMBULATORY CASE MANAGEMENT NOTE    Names and Relationships of Patient/Support Persons: Contact: Stephanie Fournier; Relationship: Emergency Contact -     Patient Outreach    Received call from patient's daughter requesting assistance completing Jardiance assistance program forms. Daughter has filled out patient requirements on forms. Collected patient assistance forms from PCP to-do basket. Will request signature and fax when PCP returns to clinic.     Education Documentation  No documentation found.        Toyin CASTELLANO  Ambulatory Case Management    1/18/2025, 09:43 EST

## 2025-01-20 ENCOUNTER — PATIENT OUTREACH (OUTPATIENT)
Dept: CASE MANAGEMENT | Facility: OTHER | Age: 89
End: 2025-01-20
Payer: MEDICARE

## 2025-01-20 NOTE — OUTREACH NOTE
AMBULATORY CASE MANAGEMENT NOTE    Names and Relationships of Patient/Support Persons:  -     Care Coordination    Completed patient assistance program forms. Received signature from PCP. Faxed forms to [facility].     Education Documentation  No documentation found.        Toyin CASTELLANO  Ambulatory Case Management    1/20/2025, 09:05 EST

## 2025-01-31 ENCOUNTER — PATIENT OUTREACH (OUTPATIENT)
Dept: CASE MANAGEMENT | Facility: OTHER | Age: 89
End: 2025-01-31
Payer: MEDICARE

## 2025-01-31 NOTE — OUTREACH NOTE
AMBULATORY CASE MANAGEMENT NOTE    Names and Relationships of Patient/Support Persons: Contact: Stephanie Fournier; Relationship: Emergency Contact -     Patient Outreach    Spoke with patient's daughter regarding patient assistance program for Jardiance. She states patient received letter indicating assistance was approved. Patient is hoping to receive medication sometime next week. Advised patient to contact [facility] if samples are needed before patient receives medication from program. No additional needs at this time. Will close program as needs have been met.     Education Documentation  No documentation found.        Toyin CASTELLANO  Ambulatory Case Management    1/31/2025, 16:25 EST

## 2025-02-01 DIAGNOSIS — N40.0 BENIGN PROSTATIC HYPERPLASIA, UNSPECIFIED WHETHER LOWER URINARY TRACT SYMPTOMS PRESENT: ICD-10-CM

## 2025-02-03 RX ORDER — FINASTERIDE 5 MG/1
5 TABLET, FILM COATED ORAL DAILY
Qty: 90 TABLET | Refills: 0 | Status: SHIPPED | OUTPATIENT
Start: 2025-02-03

## 2025-02-03 RX ORDER — FUROSEMIDE 20 MG/1
20 TABLET ORAL DAILY
Qty: 90 TABLET | Refills: 0 | Status: SHIPPED | OUTPATIENT
Start: 2025-02-03

## 2025-02-03 RX ORDER — TAMSULOSIN HYDROCHLORIDE 0.4 MG/1
1 CAPSULE ORAL DAILY
Qty: 90 CAPSULE | Refills: 0 | Status: SHIPPED | OUTPATIENT
Start: 2025-02-03

## 2025-02-10 RX ORDER — POTASSIUM CHLORIDE 1500 MG/1
TABLET, EXTENDED RELEASE ORAL DAILY
Qty: 90 TABLET | Refills: 0 | Status: SHIPPED | OUTPATIENT
Start: 2025-02-10

## 2025-02-22 DIAGNOSIS — F32.A DEPRESSION, UNSPECIFIED DEPRESSION TYPE: ICD-10-CM

## 2025-02-24 RX ORDER — VENLAFAXINE HYDROCHLORIDE 150 MG/1
150 CAPSULE, EXTENDED RELEASE ORAL DAILY
Qty: 90 CAPSULE | Refills: 1 | Status: SHIPPED | OUTPATIENT
Start: 2025-02-24

## 2025-03-31 RX ORDER — METOPROLOL TARTRATE 25 MG/1
12.5 TABLET, FILM COATED ORAL EVERY 12 HOURS SCHEDULED
Qty: 90 TABLET | Refills: 0 | Status: SHIPPED | OUTPATIENT
Start: 2025-03-31

## 2025-04-09 RX ORDER — RANOLAZINE 500 MG/1
500 TABLET, EXTENDED RELEASE ORAL 2 TIMES DAILY
Qty: 180 TABLET | Refills: 1 | Status: SHIPPED | OUTPATIENT
Start: 2025-04-09

## 2025-04-13 DIAGNOSIS — R10.13 EPIGASTRIC PAIN: ICD-10-CM

## 2025-04-15 RX ORDER — OMEPRAZOLE 20 MG/1
CAPSULE, DELAYED RELEASE ORAL
Qty: 90 CAPSULE | Refills: 3 | Status: SHIPPED | OUTPATIENT
Start: 2025-04-15 | End: 2025-04-16 | Stop reason: SDUPTHER

## 2025-04-16 ENCOUNTER — OFFICE VISIT (OUTPATIENT)
Dept: GASTROENTEROLOGY | Facility: CLINIC | Age: 89
End: 2025-04-16
Payer: MEDICARE

## 2025-04-16 VITALS
HEIGHT: 73 IN | SYSTOLIC BLOOD PRESSURE: 104 MMHG | HEART RATE: 87 BPM | BODY MASS INDEX: 33.53 KG/M2 | DIASTOLIC BLOOD PRESSURE: 83 MMHG | WEIGHT: 253 LBS

## 2025-04-16 DIAGNOSIS — R10.13 EPIGASTRIC PAIN: Primary | ICD-10-CM

## 2025-04-16 DIAGNOSIS — R11.0 NAUSEA: ICD-10-CM

## 2025-04-16 DIAGNOSIS — K76.0 FATTY INFILTRATION OF LIVER: ICD-10-CM

## 2025-04-16 DIAGNOSIS — Z13.6 ENCOUNTER FOR SCREENING FOR CARDIOVASCULAR DISORDERS: ICD-10-CM

## 2025-04-16 DIAGNOSIS — E66.811 CLASS 1 OBESITY WITH SERIOUS COMORBIDITY AND BODY MASS INDEX (BMI) OF 33.0 TO 33.9 IN ADULT, UNSPECIFIED OBESITY TYPE: ICD-10-CM

## 2025-04-16 RX ORDER — OMEPRAZOLE 20 MG/1
20 CAPSULE, DELAYED RELEASE ORAL DAILY
Qty: 90 CAPSULE | Refills: 3 | Status: SHIPPED | OUTPATIENT
Start: 2025-04-16

## 2025-04-16 NOTE — PATIENT INSTRUCTIONS
-Continue omeprazole daily in the event reflux or functional dyspepsia is contributing to symptoms, try to take 30 to 60 minutes prior to dinner.     -Recommend eating 30 to 40 g of protein 3 times daily around 8 AM, noon or 1 PM and 5 or 6 PM as it seems as though symptoms were previously aggravated by being hungry and I would like for him to work on weight loss (increased intake of protein should be helpful)    - try to decrease milk/dairy intake     Gas-X as needed    Try to work on weight loss    Consider less fiber supplement (half of what you are taking) but try to achieve 1 to 2 bowel movements daily to every other day.     Follow a diet as recommended by your health care provider. This may involve avoiding foods and drinks such as:  Coffee and tea (with or without caffeine).  Drinks that contain alcohol.  Energy drinks and sports drinks.  Carbonated drinks or sodas.  Chocolate and cocoa.  Peppermint and mint flavorings.  Garlic and onions.  Horseradish.  Spicy and acidic foods, including peppers, chili powder, sharma powder, vinegar, hot sauces, and barbecue sauce.  Citrus fruit juices and citrus fruits, such as oranges, kriss, and limes.  Tomato-based foods, such as red sauce, chili, salsa, and pizza with red sauce.  Fried and fatty foods, such as donuts, french fries, potato chips, and high-fat dressings.    Eat small, frequent meals instead of large meals.  Avoid drinking large amounts of liquid with your meals.  Avoid eating meals during the 2-3 hours before bedtime.  Avoid lying down right after you eat.

## 2025-04-16 NOTE — PROGRESS NOTES
PCP:  Mike Bhardwaj MD     No referring provider defined for this encounter.    Chief Complaint   Patient presents with    Follow-up     Patient presents for follow up on nausea. He reports that he is doing pretty good, just experiences some nausea in the morning when he takes his medications.        HPI   ***tetst    Allergies   Allergen Reactions    Other Unknown - Low Severity     Horse Serum    Tetanus Immune Globulin Unknown - Low Severity          Current Outpatient Medications:     acetaminophen (TYLENOL) 325 MG tablet, Take 2 tablets by mouth As Needed for Mild Pain., Disp: , Rfl:     apixaban (ELIQUIS) 2.5 MG tablet tablet, Take 1 tablet by mouth 2 (Two) Times a Day., Disp: 60 tablet, Rfl: 11    ascorbic acid (VITAMIN C) 1000 MG tablet, Take 1 tablet by mouth As Needed., Disp: , Rfl:     cetirizine (ZyrTEC) 10 MG tablet, Take 1 tablet by mouth As Needed., Disp: , Rfl:     coenzyme Q10 100 MG capsule, Take 2 capsules by mouth Daily., Disp: , Rfl:     empagliflozin (JARDIANCE) 25 MG tablet tablet, Take 1 tablet by mouth Daily., Disp: , Rfl:     finasteride (PROSCAR) 5 MG tablet, Take 1 tablet by mouth once daily, Disp: 90 tablet, Rfl: 0    furosemide (LASIX) 20 MG tablet, Take 1 tablet by mouth once daily, Disp: 90 tablet, Rfl: 0    ipratropium (ATROVENT) 0.06 % nasal spray, Administer  into the nostril(s) as directed by provider., Disp: , Rfl:     Loratadine 10 MG capsule, Take 1 capsule by mouth Daily., Disp: , Rfl:     metFORMIN (GLUCOPHAGE) 1000 MG tablet, TAKE 1 TABLET BY MOUTH TWICE DAILY WITH MORNING MEAL AND WITH EVENING MEAL, Disp: 180 tablet, Rfl: 0    methylcellulose, Laxative, (CITRUCEL) 500 MG tablet tablet, Take 2 tablets by mouth As Needed., Disp: , Rfl:     metoprolol tartrate (LOPRESSOR) 25 MG tablet, Take 1/2 (one-half) tablet by mouth twice daily, Disp: 90 tablet, Rfl: 0    Multiple Vitamin (MULTI VITAMIN PO), Take  by mouth Daily., Disp: , Rfl:     omeprazole (priLOSEC) 20 MG  capsule, TAKE 1 CAPSULE BY MOUTH ONCE DAILY 30 MINUTES PRIOR TO A MEAL, Disp: 90 capsule, Rfl: 3    oxybutynin XL (DITROPAN-XL) 5 MG 24 hr tablet, Take 1 tablet by mouth Daily., Disp: 30 tablet, Rfl: 2    potassium chloride (KLOR-CON M20) 20 MEQ CR tablet, Take 1 tablet by mouth once daily, Disp: 90 tablet, Rfl: 0    predniSONE (DELTASONE) 10 MG tablet, Take 1 tablet by mouth Daily., Disp: 90 tablet, Rfl: 3    pyridostigmine (MESTINON) 60 MG tablet, Take 1 tablet by mouth 3 (Three) Times a Day., Disp: 270 tablet, Rfl: 3    ranolazine (RANEXA) 500 MG 12 hr tablet, Take 1 tablet by mouth twice daily, Disp: 180 tablet, Rfl: 1    sucralfate (CARAFATE) 1 g tablet, TAKE 1 TABLET BY MOUTH TWICE DAILY WITH MEALS, Disp: 60 tablet, Rfl: 5    tamsulosin (FLOMAX) 0.4 MG capsule 24 hr capsule, Take 1 capsule by mouth once daily, Disp: 90 capsule, Rfl: 0    venlafaxine XR (EFFEXOR-XR) 150 MG 24 hr capsule, Take 1 capsule by mouth once daily, Disp: 90 capsule, Rfl: 1     Past Medical History:   Diagnosis Date    Anxiety     Benign essential hypertension     Benign prostatic hyperplasia     WITH URINARY FREQUENCY    Benign prostatic hypertrophy     Coronary artery disease     Depressive disorder     DJD (degenerative joint disease)     INVOLVING MULTIPLE JOINTS    Dyslipidemia     In March of 2016:  Total cholesterol 137, triglycerides 180, HDL 38, LDL 63.    First degree atrioventricular block     Hyperglycemia     Injury from a fall in 2011     Mixed hyperlipidemia due to type 2 diabetes mellitus     Myasthenia gravis     Obesity     Obstructive sleep apnea with C-PAP use.     Osteoarthritis     Primary osteoarthritis involving multiple joints     Recurrent major depressive episodes, moderate     Type 2 diabetes mellitus     WITHOUT HYPERGLYCEMIA WITHOUT LONG TERM CURRENT USE OF INSULIN    Type 2 diabetes, onset 2001.  In 2016, A1c was 6.5.        Past Surgical History:   Procedure Laterality Date    CYST REMOVAL  1976    BACK     HAND SURGERY      TONSILLECTOMY      TUMOR EXCISION      skin excision for tumor        Social History     Socioeconomic History    Marital status:    Tobacco Use    Smoking status: Never     Passive exposure: Never    Smokeless tobacco: Never   Vaping Use    Vaping status: Never Used   Substance and Sexual Activity    Alcohol use: Not Currently    Drug use: Never    Sexual activity: Defer        Family History   Problem Relation Age of Onset    Stroke Mother     Hypertension Mother     Stroke Father     Hypertension Father     Diabetes Brother     Hypertension Brother     Prostate cancer Brother     Hyperlipidemia Brother         Review of Systems     There were no vitals filed for this visit.     Physical Exam     There are no diagnoses linked to this encounter.     Liz Cuellar MA

## 2025-04-16 NOTE — PROGRESS NOTES
GASTROENTEROLOGY OFFICE NOTE    Arie Fournier  [id number]  1935    CARE TEAM  Patient Care Team:  Mike Bhardwaj MD as PCP - General  Mike Bhardwaj MD as PCP - Family Medicine  Arie Fatima MD as Consulting Physician (Cardiology)  Arie Lester MD as Consulting Physician (Neurology)  Jonn Dickens MD as Consulting Physician (Cardiology)    Referring Provider: No ref. provider found    Chief Complaint   Patient presents with    Follow-up     Patient presents for follow up on nausea. He reports that he is doing pretty good, just experiences some nausea in the morning when he takes his medications.        HISTORY OF PRESENT ILLNESS:   Arie Fournier is a 89 y.o. male Who returns for 6-month follow-up regarding mid to upper abdominal discomfort, nausea previously reported as aggravated by hunger/empty stomach.  He previously described worsening symptoms with intake of coffee.  Previously reported mixing tea and cola together with discontinuation of cola with possible improvement in symptoms.  He was previously prescribed Carafate with possible improvement.  He previously reported chest discomfort with large swallow of water.  He previously reported intermittent gas.     Omeprazole 20 mg daily previously recommended.  Eating schedule previously discussed as it seemed as though he felt worse when hungry.  Recommended he consider decreased dose of milk/dairy and increased intake of protein.  I expressed concern regarding fluctuating glucose and hypoglycemia contributing to symptoms.  Lifestyle modifications for reflux previously provided.  History of Present Illness  The patient is an 89-year-old male who returns with history as above. He is accompanied by his daughter.    Nausea is reported when the stomach is empty or after consuming certain foods. An irregular eating schedule is noted, with breakfast typically around 11:00 AM. Hunger is frequently experienced late at night,  between 10:00 PM and 11:00 PM. Medication prescribed for stomach issues is taken 30 minutes prior to breakfast, but the patient has run out. Refill approved electronically yesterday.     He has increased intake of some protein by intermittently adding protein bars and Ensure.     Metamucil is taken nightly, two spoonfuls mixed with water, and is reported to be beneficial. Regular bowel movements occur in the morning after breakfast. Difficulty reaching the bathroom in time is noted if bowel movements are delayed. A laxative is taken as needed.      Past Medical History:   Diagnosis Date    Anxiety     Benign essential hypertension     Benign prostatic hyperplasia     WITH URINARY FREQUENCY    Benign prostatic hypertrophy     Coronary artery disease     Depressive disorder     DJD (degenerative joint disease)     INVOLVING MULTIPLE JOINTS    Dyslipidemia     In March of 2016:  Total cholesterol 137, triglycerides 180, HDL 38, LDL 63.    First degree atrioventricular block     Hyperglycemia     Injury from a fall in 2011     Mixed hyperlipidemia due to type 2 diabetes mellitus     Myasthenia gravis     Obesity     Obstructive sleep apnea with C-PAP use.     Osteoarthritis     Primary osteoarthritis involving multiple joints     Recurrent major depressive episodes, moderate     Type 2 diabetes mellitus     WITHOUT HYPERGLYCEMIA WITHOUT LONG TERM CURRENT USE OF INSULIN    Type 2 diabetes, onset 2001.  In 2016, A1c was 6.5.         Past Surgical History:   Procedure Laterality Date    CYST REMOVAL  1976    BACK    HAND SURGERY      TONSILLECTOMY      TUMOR EXCISION      skin excision for tumor        Current Outpatient Medications on File Prior to Visit   Medication Sig    acetaminophen (TYLENOL) 325 MG tablet Take 2 tablets by mouth As Needed for Mild Pain.    apixaban (ELIQUIS) 2.5 MG tablet tablet Take 1 tablet by mouth 2 (Two) Times a Day.    ascorbic acid (VITAMIN C) 1000 MG tablet Take 1 tablet by mouth As Needed.     cetirizine (ZyrTEC) 10 MG tablet Take 1 tablet by mouth As Needed.    coenzyme Q10 100 MG capsule Take 2 capsules by mouth Daily.    empagliflozin (JARDIANCE) 25 MG tablet tablet Take 1 tablet by mouth Daily.    finasteride (PROSCAR) 5 MG tablet Take 1 tablet by mouth once daily    furosemide (LASIX) 20 MG tablet Take 1 tablet by mouth once daily    ipratropium (ATROVENT) 0.06 % nasal spray Administer  into the nostril(s) as directed by provider.    Loratadine 10 MG capsule Take 1 capsule by mouth Daily.    metFORMIN (GLUCOPHAGE) 1000 MG tablet TAKE 1 TABLET BY MOUTH TWICE DAILY WITH MORNING MEAL AND WITH EVENING MEAL    methylcellulose, Laxative, (CITRUCEL) 500 MG tablet tablet Take 2 tablets by mouth As Needed.    metoprolol tartrate (LOPRESSOR) 25 MG tablet Take 1/2 (one-half) tablet by mouth twice daily    Multiple Vitamin (MULTI VITAMIN PO) Take  by mouth Daily.    oxybutynin XL (DITROPAN-XL) 5 MG 24 hr tablet Take 1 tablet by mouth Daily.    potassium chloride (KLOR-CON M20) 20 MEQ CR tablet Take 1 tablet by mouth once daily    predniSONE (DELTASONE) 10 MG tablet Take 1 tablet by mouth Daily.    pyridostigmine (MESTINON) 60 MG tablet Take 1 tablet by mouth 3 (Three) Times a Day.    ranolazine (RANEXA) 500 MG 12 hr tablet Take 1 tablet by mouth twice daily    sucralfate (CARAFATE) 1 g tablet TAKE 1 TABLET BY MOUTH TWICE DAILY WITH MEALS    tamsulosin (FLOMAX) 0.4 MG capsule 24 hr capsule Take 1 capsule by mouth once daily    venlafaxine XR (EFFEXOR-XR) 150 MG 24 hr capsule Take 1 capsule by mouth once daily     No current facility-administered medications on file prior to visit.       Allergies   Allergen Reactions    Other Unknown - Low Severity     Horse Serum    Tetanus Immune Globulin Unknown - Low Severity       Family History   Problem Relation Age of Onset    Stroke Mother     Hypertension Mother     Stroke Father     Hypertension Father     Diabetes Brother     Hypertension Brother     Prostate  "cancer Brother     Hyperlipidemia Brother        Social History     Socioeconomic History    Marital status:    Tobacco Use    Smoking status: Never     Passive exposure: Never    Smokeless tobacco: Never   Vaping Use    Vaping status: Never Used   Substance and Sexual Activity    Alcohol use: Not Currently    Drug use: Never    Sexual activity: Defer       PHYSICAL EXAM   /83   Pulse 87   Ht 185.4 cm (73\")   Wt 115 kg (253 lb)   BMI 33.38 kg/m²   Physical Exam  Constitutional:       General: He is not in acute distress.     Appearance: He is not toxic-appearing.   HENT:      Head: Normocephalic and atraumatic. No contusion.      Right Ear: External ear normal.      Left Ear: External ear normal.   Eyes:      General: Lids are normal. No scleral icterus.        Right eye: No discharge.         Left eye: No discharge.      Extraocular Movements: Extraocular movements intact.   Neck:      Trachea: Trachea normal.      Comments: No visible mass  No visible adenopathy  Cardiovascular:      Rate and Rhythm: Normal rate.   Pulmonary:      Effort: No respiratory distress.      Comments: Symmetrical expansion    Musculoskeletal:      Comments: Symmetrical movement of upper extremities  Symmetrical movement of lower extremities  No visible deformities   Skin:     General: Skin is warm and dry.      Coloration: Skin is not jaundiced.   Neurological:      General: No focal deficit present.      Mental Status: He is alert and oriented to person, place, and time.   Psychiatric:         Mood and Affect: Mood normal.         Behavior: Behavior normal.         Thought Content: Thought content normal.     Results Review:  7/10/2024 ultrasound abdomen revealed mild diffuse hepatic steatosis and otherwise appeared normal.   CMP          9/3/2024    16:10   CMP   Glucose 136    BUN 25    Creatinine 1.05    EGFR 68    Sodium 139    Potassium 4.8    Chloride 97    Calcium 9.5    Total Protein 6.5    Albumin 4.5  "   Globulin 2.0    Total Bilirubin 0.6    Alkaline Phosphatase 60    AST (SGOT) 16    ALT (SGPT) 18    BUN/Creatinine Ratio 24      CBC          9/3/2024    16:10   CBC   WBC 7.9    RBC 5.21    Hemoglobin 16.8    Hematocrit 50.2    MCV 96    MCH 32.2    MCHC 33.5    RDW 13.8    Platelets 210        ASSESSMENT / PLAN    Assessment & Plan      1. Epigastric pain  2. Nausea  - Take omeprazole 30 minutes to an hour before the last meal of the day instead of in the morning.  - Prescription for omeprazole sent to [facility] [place] yesterday and at time of office vivist  - Notify the office if there are any issues with the pharmacy.  - continue Metamucil but consider lower dose to determine if still beneficial without possible adverse effect. Try to achieve 1 to 2 productive bowel movements daily to every other day  - Consider decrease milk/dairy intake  -Consider Gas-X as needed  -Lifestyle modifications for reflux provided  - omeprazole (priLOSEC) 20 MG capsule; Take 1 capsule by mouth Daily. 30 to 60 minutes prior to dinner  Dispense: 90 capsule; Refill: 3  3. Fatty infiltration of liver  4. Class 1 obesity with serious comorbidity and body mass index (BMI) of 33.0 to 33.9 in adult, unspecified obesity type  5. Encounter for screening for cardiovascular disorders (included as prompted by epic when ordering FORDE fibrosure)  7/10/2024 ultrasound abdomen revealed mild diffuse hepatic steatosis and otherwise appeared normal.   -Liver enzymes normal 9/3/2024 as above  -weight loss would be helpful  -reiterated recommendation for patient to try to increase intake of protein to 30 to 40 g 3 times daily (gave examples of Greek yogurt with breakfast, protein drink supplements)  -Consider intermittently checking CBC to evaluate platelet count (platelet count less than 120 is concerning for advanced liver disease); CMP to evaluate liver enzymes and albumin (hypoalbuminemia is concerning for advanced liver disease); INR (if INR  is prolonged this may raise suspicion for advancing liver disease/fibrosis but he takes Eliquis which may prolong INR)  - check labs as below when fasting for 8 hours  - FORDE Fibrosure Plus; Future  - CBC (No Diff); Future  - Protime-INR; Future      Return in about 6 months (around 10/16/2025).    Patient or patient representative verbalized consent for the use of Ambient Listening during the visit with  TAMERA Randolph for chart documentation. 4/16/2025  1520  EDT    TAMERA Randolph  04/16/2025

## 2025-04-24 ENCOUNTER — OFFICE VISIT (OUTPATIENT)
Dept: NEUROLOGY | Facility: CLINIC | Age: 89
End: 2025-04-24
Payer: MEDICARE

## 2025-04-24 ENCOUNTER — LAB (OUTPATIENT)
Dept: LAB | Facility: HOSPITAL | Age: 89
End: 2025-04-24
Payer: MEDICARE

## 2025-04-24 VITALS
BODY MASS INDEX: 33.66 KG/M2 | WEIGHT: 254 LBS | DIASTOLIC BLOOD PRESSURE: 62 MMHG | HEIGHT: 73 IN | OXYGEN SATURATION: 98 % | HEART RATE: 84 BPM | SYSTOLIC BLOOD PRESSURE: 118 MMHG

## 2025-04-24 DIAGNOSIS — R73.09 OTHER ABNORMAL GLUCOSE: ICD-10-CM

## 2025-04-24 DIAGNOSIS — G70.00 MYASTHENIA GRAVIS WITHOUT EXACERBATION: ICD-10-CM

## 2025-04-24 DIAGNOSIS — G70.00 MYASTHENIA GRAVIS WITHOUT EXACERBATION: Primary | Chronic | ICD-10-CM

## 2025-04-24 LAB
BASOPHILS # BLD AUTO: 0.01 10*3/MM3 (ref 0–0.2)
BASOPHILS NFR BLD AUTO: 0.1 % (ref 0–1.5)
DEPRECATED RDW RBC AUTO: 43.9 FL (ref 37–54)
EOSINOPHIL # BLD AUTO: 0.06 10*3/MM3 (ref 0–0.4)
EOSINOPHIL NFR BLD AUTO: 0.8 % (ref 0.3–6.2)
ERYTHROCYTE [DISTWIDTH] IN BLOOD BY AUTOMATED COUNT: 12.7 % (ref 12.3–15.4)
HBA1C MFR BLD: 7 % (ref 4.8–5.6)
HCT VFR BLD AUTO: 48.2 % (ref 37.5–51)
HGB BLD-MCNC: 16.6 G/DL (ref 13–17.7)
IMM GRANULOCYTES # BLD AUTO: 0.03 10*3/MM3 (ref 0–0.05)
IMM GRANULOCYTES NFR BLD AUTO: 0.4 % (ref 0–0.5)
LYMPHOCYTES # BLD AUTO: 0.9 10*3/MM3 (ref 0.7–3.1)
LYMPHOCYTES NFR BLD AUTO: 12.3 % (ref 19.6–45.3)
MCH RBC QN AUTO: 32.6 PG (ref 26.6–33)
MCHC RBC AUTO-ENTMCNC: 34.4 G/DL (ref 31.5–35.7)
MCV RBC AUTO: 94.7 FL (ref 79–97)
MONOCYTES # BLD AUTO: 0.56 10*3/MM3 (ref 0.1–0.9)
MONOCYTES NFR BLD AUTO: 7.7 % (ref 5–12)
NEUTROPHILS NFR BLD AUTO: 5.75 10*3/MM3 (ref 1.7–7)
NEUTROPHILS NFR BLD AUTO: 78.7 % (ref 42.7–76)
NRBC BLD AUTO-RTO: 0 /100 WBC (ref 0–0.2)
PLATELET # BLD AUTO: 201 10*3/MM3 (ref 140–450)
PMV BLD AUTO: 11.7 FL (ref 6–12)
RBC # BLD AUTO: 5.09 10*6/MM3 (ref 4.14–5.8)
WBC NRBC COR # BLD AUTO: 7.31 10*3/MM3 (ref 3.4–10.8)

## 2025-04-24 PROCEDURE — 1159F MED LIST DOCD IN RCRD: CPT | Performed by: PSYCHIATRY & NEUROLOGY

## 2025-04-24 PROCEDURE — 1160F RVW MEDS BY RX/DR IN RCRD: CPT | Performed by: PSYCHIATRY & NEUROLOGY

## 2025-04-24 PROCEDURE — 36415 COLL VENOUS BLD VENIPUNCTURE: CPT

## 2025-04-24 PROCEDURE — 80053 COMPREHEN METABOLIC PANEL: CPT

## 2025-04-24 PROCEDURE — 83036 HEMOGLOBIN GLYCOSYLATED A1C: CPT

## 2025-04-24 PROCEDURE — 85025 COMPLETE CBC W/AUTO DIFF WBC: CPT

## 2025-04-24 PROCEDURE — 99214 OFFICE O/P EST MOD 30 MIN: CPT | Performed by: PSYCHIATRY & NEUROLOGY

## 2025-04-24 NOTE — PROGRESS NOTES
"Chief Complaint  Myasthenia gravis without exacerbation    Subjective        Arie Fournier presents to [facility] NEUROLOGY  History of Present Illness    89 y.o. male returns in follow up.  Last visit on 10/23/24 continued Prednisone and mestinon.       Trouble stepping up and walking over 10 - 15 steps.        Proximal LE weakness.       No longer having issues clearing secretions.  Proximal arm and legs giving out.        Diplopia is rare.       Denies choking, trouble chewing.       SOB exertion.       Always uses arms to get out of chair.  Rests arms when shaving.      Mestinon improving sx.      MG ADL 5     Reviewed medical records:     Onset of Diplopia 7/4/23.       MRI Brain, my review of films, 7/4/23 moderate atrophy.       MUSK negative     AChR binding 6.3, blocking 55, modulating 57        Objective   Vital Signs:  /62   Pulse 84   Ht 185.4 cm (72.99\")   Wt 115 kg (254 lb)   SpO2 98%   BMI 33.52 kg/m²   Estimated body mass index is 33.52 kg/m² as calculated from the following:    Height as of this encounter: 185.4 cm (72.99\").    Weight as of this encounter: 115 kg (254 lb).    Neurological Exam  Mental Status  Awake, alert and oriented to person, place and time. Oriented to person, place and time. Speech is normal. Language is fluent with no aphasia. Attention and concentration are normal. Fund of knowledge is appropriate for level of education.    Cranial Nerves  CN III, IV, VI: Extraocular movements intact bilaterally. Pupils equal round and reactive to light bilaterally.  CN V: Facial sensation is normal.  CN VII: Full and symmetric facial movement.  CN IX, X: Palate elevates symmetrically  CN XI: Shoulder shrug strength is normal.  CN XII: Tongue midline without atrophy or fasciculations.    Motor                                               Right                     Left  Hip flexion                              4                          4  Hip extension     "                     4                          4    Sensory  Sensation is intact to light touch, pinprick, vibration and proprioception in all four extremities.    Reflexes  Deep tendon reflexes are 2+ and symmetric in all four extremities.    Coordination    Finger-to-nose, rapid alternating movements and heel-to-shin normal bilaterally without dysmetria.    Gait  Normal casual, toe, heel and tandem gait.         Physical Exam  Eyes:      Extraocular Movements: Extraocular movements intact.      Pupils: Pupils are equal, round, and reactive to light.   Neurological:      Coordination: Coordination is intact.      Deep Tendon Reflexes: Reflexes are normal and symmetric.   Psychiatric:         Speech: Speech normal.        Result Review :  The following data was reviewed by: Arie Lester MD on 04/24/2025:  Common labs          9/3/2024    16:10 9/3/2024    16:14 9/3/2024    16:30   Common Labs   Glucose 136      BUN 25      Creatinine 1.05      Sodium 139      Potassium 4.8      Chloride 97      Calcium 9.5      Albumin 4.5      Total Bilirubin 0.6      Alkaline Phosphatase 60      AST (SGOT) 16      ALT (SGPT) 18      WBC 7.9      Hemoglobin 16.8      Hematocrit 50.2      Platelets 210      Total Cholesterol 212      Triglycerides 246      HDL Cholesterol 52      LDL Cholesterol  117      Hemoglobin A1C  6.8     Microalbumin, Urine   <3.0                Assessment and Plan   Diagnoses and all orders for this visit:    1. Myasthenia gravis without exacerbation (Primary)  Assessment & Plan:  Painful weakness in B proximal LE    CBC,CMP, A1C    Continue Prednisone 10 mg daily     Orders:  -     CBC & Differential; Future  -     Comprehensive Metabolic Panel; Future  -     Hemoglobin A1c; Future    2. Other abnormal glucose  -     Hemoglobin A1c; Future             Follow Up   No follow-ups on file.  Patient was given instructions and counseling regarding his condition or for health maintenance advice. Please see  specific information pulled into the AVS if appropriate.

## 2025-04-25 LAB
ALBUMIN SERPL-MCNC: 4.2 G/DL (ref 3.5–5.2)
ALBUMIN/GLOB SERPL: 1.8 G/DL
ALP SERPL-CCNC: 51 U/L (ref 39–117)
ALT SERPL W P-5'-P-CCNC: 20 U/L (ref 1–41)
ANION GAP SERPL CALCULATED.3IONS-SCNC: 14 MMOL/L (ref 5–15)
AST SERPL-CCNC: 19 U/L (ref 1–40)
BILIRUB SERPL-MCNC: 0.4 MG/DL (ref 0–1.2)
BUN SERPL-MCNC: 24 MG/DL (ref 8–23)
BUN/CREAT SERPL: 24 (ref 7–25)
CALCIUM SPEC-SCNC: 9.8 MG/DL (ref 8.6–10.5)
CHLORIDE SERPL-SCNC: 99 MMOL/L (ref 98–107)
CO2 SERPL-SCNC: 25 MMOL/L (ref 22–29)
CREAT SERPL-MCNC: 1 MG/DL (ref 0.76–1.27)
EGFRCR SERPLBLD CKD-EPI 2021: 71.9 ML/MIN/1.73
GLOBULIN UR ELPH-MCNC: 2.4 GM/DL
GLUCOSE SERPL-MCNC: 117 MG/DL (ref 65–99)
POTASSIUM SERPL-SCNC: 4.7 MMOL/L (ref 3.5–5.2)
PROT SERPL-MCNC: 6.6 G/DL (ref 6–8.5)
SODIUM SERPL-SCNC: 138 MMOL/L (ref 136–145)

## 2025-04-28 RX ORDER — OXYBUTYNIN CHLORIDE 5 MG/1
5 TABLET, EXTENDED RELEASE ORAL DAILY
Qty: 30 TABLET | Refills: 0 | Status: SHIPPED | OUTPATIENT
Start: 2025-04-28

## 2025-05-03 DIAGNOSIS — N40.0 BENIGN PROSTATIC HYPERPLASIA, UNSPECIFIED WHETHER LOWER URINARY TRACT SYMPTOMS PRESENT: ICD-10-CM

## 2025-05-05 ENCOUNTER — OFFICE VISIT (OUTPATIENT)
Dept: CARDIOLOGY | Facility: CLINIC | Age: 89
End: 2025-05-05
Payer: MEDICARE

## 2025-05-05 VITALS
WEIGHT: 261.2 LBS | BODY MASS INDEX: 34.62 KG/M2 | SYSTOLIC BLOOD PRESSURE: 128 MMHG | OXYGEN SATURATION: 95 % | HEIGHT: 73 IN | DIASTOLIC BLOOD PRESSURE: 76 MMHG | HEART RATE: 88 BPM

## 2025-05-05 DIAGNOSIS — I25.10 CORONARY ARTERY DISEASE INVOLVING NATIVE CORONARY ARTERY OF NATIVE HEART WITHOUT ANGINA PECTORIS: Primary | ICD-10-CM

## 2025-05-05 DIAGNOSIS — I48.0 PAROXYSMAL ATRIAL FIBRILLATION: Chronic | ICD-10-CM

## 2025-05-05 DIAGNOSIS — I73.9 CLAUDICATION: Primary | ICD-10-CM

## 2025-05-05 DIAGNOSIS — I10 BENIGN ESSENTIAL HTN: ICD-10-CM

## 2025-05-05 DIAGNOSIS — E78.5 DYSLIPIDEMIA: ICD-10-CM

## 2025-05-05 PROCEDURE — 99214 OFFICE O/P EST MOD 30 MIN: CPT | Performed by: INTERNAL MEDICINE

## 2025-05-05 PROCEDURE — G2211 COMPLEX E/M VISIT ADD ON: HCPCS | Performed by: INTERNAL MEDICINE

## 2025-05-05 RX ORDER — TAMSULOSIN HYDROCHLORIDE 0.4 MG/1
1 CAPSULE ORAL DAILY
Qty: 90 CAPSULE | Refills: 0 | Status: SHIPPED | OUTPATIENT
Start: 2025-05-05

## 2025-05-05 RX ORDER — FINASTERIDE 5 MG/1
5 TABLET, FILM COATED ORAL DAILY
Qty: 90 TABLET | Refills: 0 | Status: SHIPPED | OUTPATIENT
Start: 2025-05-05

## 2025-05-05 RX ORDER — ATORVASTATIN CALCIUM 40 MG/1
40 TABLET, FILM COATED ORAL DAILY
Qty: 90 TABLET | Refills: 3 | Status: SHIPPED | OUTPATIENT
Start: 2025-05-05

## 2025-05-05 NOTE — PROGRESS NOTES
OFFICE VISIT  NOTE  [facility] CARDIOLOGY      Name: Arie Fournier    Date: 2025  MRN:  [id number]  :  1935      REFERRING/PRIMARY PROVIDER:  Mike Bhardwaj MD    Chief Complaint   Patient presents with    Coronary artery disease involving native coronary artery of       HPI: Arie Fournier is a 89 y.o. male who presents for CAD, PAF, hypertension, hyperlipidemia.  Cath  in [place], mild 30% LAD and RCA.  EF 60% history of myasthenia gravis as well.  History of PAF on apixaban 2.5 mg last echo 2023 EF 51% RVSP normal mild AI.  Main complaint is weakness fatigue, shortness of breath, sleeping in a recliner over the last couple months.  Still having occasional chest pains, undergoing EGD soon with biopsy possible.  Still with sharp focal atypical chest pain lasting a few seconds.  Low risk stress test 2024.  He was having the same symptoms at the time of his previous stress test.  Echo 2024 shows EF 65 to 70%, mild to moderate AI.  Overall the same, no significant change since last visit.    Past Medical History:   Diagnosis Date    Anxiety     Benign essential hypertension     Benign prostatic hyperplasia     WITH URINARY FREQUENCY    Benign prostatic hypertrophy     Coronary artery disease     Depressive disorder     DJD (degenerative joint disease)     INVOLVING MULTIPLE JOINTS    Dyslipidemia     In 2016:  Total cholesterol 137, triglycerides 180, HDL 38, LDL 63.    First degree atrioventricular block     Hyperglycemia     Injury from a fall in      Mixed hyperlipidemia due to type 2 diabetes mellitus     Myasthenia gravis     Obesity     Obstructive sleep apnea with C-PAP use.     Osteoarthritis     Primary osteoarthritis involving multiple joints     Recurrent major depressive episodes, moderate     Type 2 diabetes mellitus     WITHOUT HYPERGLYCEMIA WITHOUT LONG TERM CURRENT USE OF INSULIN    Type 2 diabetes, onset .  In 2016, A1c was  6.5.        Past Surgical History:   Procedure Laterality Date    CYST REMOVAL  1976    BACK    HAND SURGERY      TONSILLECTOMY      TUMOR EXCISION      skin excision for tumor       Social History     Socioeconomic History    Marital status:    Tobacco Use    Smoking status: Never     Passive exposure: Never    Smokeless tobacco: Never   Vaping Use    Vaping status: Never Used   Substance and Sexual Activity    Alcohol use: Not Currently    Drug use: Never    Sexual activity: Defer       Family History   Problem Relation Age of Onset    Stroke Mother     Hypertension Mother     Stroke Father     Hypertension Father     Diabetes Brother     Hypertension Brother     Prostate cancer Brother     Hyperlipidemia Brother         ROS:   Constitutional no fever,  no weight loss   Skin no rash, no subcutaneous nodules   Otolaryngeal no difficulty swallowing   Cardiovascular See HPI   Pulmonary no cough, no sputum production   Gastrointestinal no constipation, no diarrhea   Genitourinary no dysuria, no hematuria   Hematologic no easy bruisability, no abnormal bleeding   Musculoskeletal no muscle pain   Neurologic no dizziness, no falls         Allergies   Allergen Reactions    Other Unknown - Low Severity     Horse Serum    Tetanus Immune Globulin Unknown - Low Severity         Current Outpatient Medications:     acetaminophen (TYLENOL) 325 MG tablet, Take 2 tablets by mouth As Needed for Mild Pain., Disp: , Rfl:     apixaban (ELIQUIS) 2.5 MG tablet tablet, Take 1 tablet by mouth 2 (Two) Times a Day., Disp: 60 tablet, Rfl: 11    ascorbic acid (VITAMIN C) 1000 MG tablet, Take 1 tablet by mouth As Needed., Disp: , Rfl:     cetirizine (ZyrTEC) 10 MG tablet, Take 1 tablet by mouth As Needed., Disp: , Rfl:     coenzyme Q10 100 MG capsule, Take 2 capsules by mouth Daily., Disp: , Rfl:     empagliflozin (JARDIANCE) 25 MG tablet tablet, Take 1 tablet by mouth Daily., Disp: , Rfl:     finasteride (PROSCAR) 5 MG tablet, Take 1  tablet by mouth once daily, Disp: 90 tablet, Rfl: 0    furosemide (LASIX) 20 MG tablet, Take 1 tablet by mouth once daily, Disp: 90 tablet, Rfl: 0    ipratropium (ATROVENT) 0.06 % nasal spray, Administer  into the nostril(s) as directed by provider., Disp: , Rfl:     Loratadine 10 MG capsule, Take 1 capsule by mouth Daily., Disp: , Rfl:     metFORMIN (GLUCOPHAGE) 1000 MG tablet, TAKE 1 TABLET BY MOUTH TWICE DAILY WITH MORNING MEAL AND WITH EVENING MEAL, Disp: 180 tablet, Rfl: 0    methylcellulose, Laxative, (CITRUCEL) 500 MG tablet tablet, Take 2 tablets by mouth As Needed., Disp: , Rfl:     metoprolol tartrate (LOPRESSOR) 25 MG tablet, Take 1/2 (one-half) tablet by mouth twice daily, Disp: 90 tablet, Rfl: 0    Multiple Vitamin (MULTI VITAMIN PO), Take  by mouth Daily., Disp: , Rfl:     omeprazole (priLOSEC) 20 MG capsule, Take 1 capsule by mouth Daily. 30 to 60 minutes prior to dinner, Disp: 90 capsule, Rfl: 3    oxybutynin XL (DITROPAN-XL) 5 MG 24 hr tablet, Take 1 tablet by mouth once daily, Disp: 30 tablet, Rfl: 0    potassium chloride (KLOR-CON M20) 20 MEQ CR tablet, Take 1 tablet by mouth once daily, Disp: 90 tablet, Rfl: 0    predniSONE (DELTASONE) 10 MG tablet, Take 1 tablet by mouth Daily., Disp: 90 tablet, Rfl: 3    pyridostigmine (MESTINON) 60 MG tablet, Take 1 tablet by mouth 3 (Three) Times a Day., Disp: 270 tablet, Rfl: 3    ranolazine (RANEXA) 500 MG 12 hr tablet, Take 1 tablet by mouth twice daily, Disp: 180 tablet, Rfl: 1    sucralfate (CARAFATE) 1 g tablet, TAKE 1 TABLET BY MOUTH TWICE DAILY WITH MEALS, Disp: 60 tablet, Rfl: 5    tamsulosin (FLOMAX) 0.4 MG capsule 24 hr capsule, Take 1 capsule by mouth once daily, Disp: 90 capsule, Rfl: 0    venlafaxine XR (EFFEXOR-XR) 150 MG 24 hr capsule, Take 1 capsule by mouth once daily, Disp: 90 capsule, Rfl: 1    atorvastatin (LIPITOR) 40 MG tablet, Take 1 tablet by mouth Daily., Disp: 90 tablet, Rfl: 3    Vitals:    05/05/25 1358   BP: 128/76   Pulse: 88  "  SpO2: 95%   Weight: 118 kg (261 lb 3.2 oz)   Height: 185.4 cm (73\")       Body mass index is 34.46 kg/m².    PHYSICAL EXAM:    General Appearance:   · well developed  · well nourished  Neck:  · thyroid not enlarged  · supple  Respiratory:  · no respiratory distress  · normal breath sounds  · no rales  Cardiovascular:  · no jugular venous distention  · regular rhythm  · apical impulse normal  · S1 normal, S2 normal  · no S3, no S4   · no murmur  · no rub, no thrill  · carotid pulses normal; no bruit  · pedal pulses normal  · lower extremity edema: none    Skin:   warm, dry      RESULTS:   Procedures    Results for orders placed in visit on 02/19/24    Adult Transthoracic Echo Complete w/ Color, Spectral and Contrast if necessary per protocol    Interpretation Summary    Left ventricular systolic function is hyperdynamic (EF > 70%). Calculated left ventricular EF = 73% Left ventricular ejection fraction appears to be 66 - 70%.    Left ventricular diastolic function was normal.    The left atrial cavity is mildly dilated.    Estimated right ventricular systolic pressure from tricuspid regurgitation is normal (<35 mmHg). Calculated right ventricular systolic pressure from tricuspid regurgitation is 30 mmHg.    Mild to moderate aortic valve regurgitation.    No significant change compared to 4/2023        Labs:  Lab Results   Component Value Date    TRIG 246 (H) 09/03/2024    HDL 52 09/03/2024     (H) 09/03/2024    AST 19 04/24/2025    ALT 20 04/24/2025     Lab Results   Component Value Date    HGBA1C 7.00 (H) 04/24/2025     No components found for: \"CREATINININE\"  No results found for: \"EGFRIFNONA\"    Most recent PCP note, imaging tests, and labs reviewed.    ASSESSMENT:  Problem List Items Addressed This Visit       Coronary artery disease - Primary (Chronic)    Overview   In 2002, left heart catheterization with minimal diffuse disease in the right coronary artery and minimal stenosis of 30% to the proximal " LAD.  Left ventricular ejection fraction 50%.  Mild elevation of left ventricular end diastolic pressure, at [place], [place], per Chad Montes De Oca.    DIAGNOSTIC DATA:      April 22, 2016:  EKG:  Sinus rhythm with first-degree AV block, otherwise normal EKG with a ventricular rate of 60 beats per minute.  WI interval of 260 msec., QRS duration of 92 msec., QTC of 414 msec.        March of 2016:  Total cholesterol of 137, triglycerides of 180, HDL 38, LDL of 63.   Hemoglobin A1c of 6.5.    Sodium 139, potassium 4.3, chloride 101, CO2 of 25, BUN of 21, creatinine of 0.6, glucose of 113.   TSH of 1.5.   WBC of 6.3, hemoglobin of 15, hematocrit of 47, platelet count of 212,000.            Dyslipidemia (Chronic)    Overview   In March of 2016:  Total cholesterol 137, triglycerides 180, HDL 38, LDL 63.           Benign essential HTN (Chronic)    Paroxysmal atrial fibrillation (Chronic)       PLAN:    1.  Chest pain:  Low risk nuclear stress test 2/2024  Echo 2/2024 showed EF 70%, mild left atrial enlargement, mild to moderate aortic valve regurgitation no change from 4/2023  Continue Ranexa  Chest pain is atypical, no further ischemic evaluation necessary at this time.    2.  CAD:  Continue pharmacotherapy for prevention    Increased Ranexa 8/2024, due to angina, symptoms unchanged.    3.  Hyperlipidemia:  We stopped atorvastatin temporarily but did not help his weakness therefore we will resume atorvastatin 40 mg daily due to uncontrolled hyperlipidemia with history of CAD    4.  PAF:  Continue Eliquis for stroke prevention  Samples given today    5.  Hypertension:  Well-controlled on current regimen, continue for now.      Advance Care Planning   ACP discussion was held with the patient during this visit. Patient has an advance directive (not in EMR), copy requested.         Return to clinic in 9 months, or sooner as needed.    Thank you for the opportunity to share in the care of your patient;  please do not hesitate to call me with any questions.     Jonn Dickens MD, [facility], [facility]  Office: [phone] [address] [place] [address]    05/05/25

## 2025-05-07 ENCOUNTER — LAB (OUTPATIENT)
Dept: FAMILY MEDICINE CLINIC | Facility: CLINIC | Age: 89
End: 2025-05-07
Payer: MEDICARE

## 2025-05-07 DIAGNOSIS — K76.0 FATTY INFILTRATION OF LIVER: ICD-10-CM

## 2025-05-07 DIAGNOSIS — E66.811 CLASS 1 OBESITY WITH SERIOUS COMORBIDITY AND BODY MASS INDEX (BMI) OF 33.0 TO 33.9 IN ADULT, UNSPECIFIED OBESITY TYPE: ICD-10-CM

## 2025-05-07 DIAGNOSIS — Z13.6 ENCOUNTER FOR SCREENING FOR CARDIOVASCULAR DISORDERS: ICD-10-CM

## 2025-05-09 LAB
A2 MACROGLOB SERPL-MCNC: 330 MG/DL (ref 110–276)
ALT SERPL W P-5'-P-CCNC: 19 IU/L (ref 0–55)
APO A-I SERPL-MCNC: 147 MG/DL (ref 101–178)
AST SERPL W P-5'-P-CCNC: 16 IU/L (ref 0–40)
BILIRUB SERPL-MCNC: 0.7 MG/DL (ref 0–1.2)
CHOLEST SERPL-MCNC: 215 MG/DL (ref 100–199)
ERYTHROCYTE [DISTWIDTH] IN BLOOD BY AUTOMATED COUNT: 13.2 % (ref 11.6–15.4)
FIBROSIS SCORING:: ABNORMAL
FIBROSIS STAGE SERPL QL: ABNORMAL
GGT SERPL-CCNC: 14 IU/L (ref 0–65)
GLUCOSE SERPL-MCNC: 135 MG/DL (ref 70–99)
HAPTOGLOB SERPL-MCNC: 95 MG/DL (ref 38–329)
HCT VFR BLD AUTO: 49.7 % (ref 37.5–51)
HGB BLD-MCNC: 16 G/DL (ref 13–17.7)
INR PPP: 1.1 (ref 0.9–1.2)
LABORATORY COMMENT REPORT: ABNORMAL
LIVER FIBR SCORE SERPL CALC.FIBROSURE: 0.71 (ref 0–0.21)
LIVER STEATOSIS GRADE SERPL QL: ABNORMAL
LIVER STEATOSIS SCORE SERPL: 0.53 (ref 0–0.4)
MCH RBC QN AUTO: 31.7 PG (ref 26.6–33)
MCHC RBC AUTO-ENTMCNC: 32.2 G/DL (ref 31.5–35.7)
MCV RBC AUTO: 98 FL (ref 79–97)
NASH GRADE SERPL QL: ABNORMAL
NASH INTERPRETATION SERPL-IMP: ABNORMAL
NASH SCORE SERPL: 0.68 (ref 0–0.25)
NASH SCORING: ABNORMAL
PLATELET # BLD AUTO: 198 X10E3/UL (ref 150–450)
PROTHROMBIN TIME: 11.7 SEC (ref 9.1–12)
RBC # BLD AUTO: 5.05 X10E6/UL (ref 4.14–5.8)
STEATOSIS SCORING: ABNORMAL
TEST PERFORMANCE INFO SPEC: ABNORMAL
TEST PERFORMANCE INFO SPEC: ABNORMAL
TRIGL SERPL-MCNC: 200 MG/DL (ref 0–149)
WBC # BLD AUTO: 6 X10E3/UL (ref 3.4–10.8)

## 2025-05-27 RX ORDER — OXYBUTYNIN CHLORIDE 5 MG/1
5 TABLET, EXTENDED RELEASE ORAL DAILY
Qty: 30 TABLET | Refills: 0 | Status: SHIPPED | OUTPATIENT
Start: 2025-05-27

## 2025-06-05 ENCOUNTER — OFFICE VISIT (OUTPATIENT)
Dept: FAMILY MEDICINE CLINIC | Facility: CLINIC | Age: 89
End: 2025-06-05
Payer: MEDICARE

## 2025-06-05 VITALS
SYSTOLIC BLOOD PRESSURE: 110 MMHG | WEIGHT: 256 LBS | DIASTOLIC BLOOD PRESSURE: 76 MMHG | OXYGEN SATURATION: 93 % | BODY MASS INDEX: 33.93 KG/M2 | HEIGHT: 73 IN | HEART RATE: 78 BPM

## 2025-06-05 DIAGNOSIS — G89.29 CHRONIC LOW BACK PAIN, UNSPECIFIED BACK PAIN LATERALITY, UNSPECIFIED WHETHER SCIATICA PRESENT: Primary | ICD-10-CM

## 2025-06-05 DIAGNOSIS — M54.50 CHRONIC LOW BACK PAIN, UNSPECIFIED BACK PAIN LATERALITY, UNSPECIFIED WHETHER SCIATICA PRESENT: Primary | ICD-10-CM

## 2025-06-05 DIAGNOSIS — N32.81 OVERACTIVE BLADDER: ICD-10-CM

## 2025-06-05 DIAGNOSIS — R60.9 EDEMA, UNSPECIFIED TYPE: ICD-10-CM

## 2025-06-05 DIAGNOSIS — L84 CALLUS: ICD-10-CM

## 2025-06-05 PROCEDURE — 99214 OFFICE O/P EST MOD 30 MIN: CPT | Performed by: FAMILY MEDICINE

## 2025-06-05 PROCEDURE — 1126F AMNT PAIN NOTED NONE PRSNT: CPT | Performed by: FAMILY MEDICINE

## 2025-06-05 RX ORDER — NABUMETONE 500 MG/1
500 TABLET, FILM COATED ORAL 2 TIMES DAILY
Qty: 30 TABLET | Refills: 0 | Status: SHIPPED | OUTPATIENT
Start: 2025-06-05

## 2025-06-05 NOTE — PROGRESS NOTES
Follow Up Office Visit      Date of Visit:  2025   Patient Name: Arie Fournier  : 1935   MRN: [id number]     Chief Complaint:    Chief Complaint   Patient presents with    Back Pain    Joint Swelling     Ankles       History of Present Illness: Arie Fournier is a 89 y.o. male who is here today for follow up.    History of Present Illness  The patient presents for evaluation of ankle swelling, back pain, callus on the foot, and overactive bladder.    He reports experiencing fatigue, which he attributes to frequent hospital visits with his wife. His children have expressed concern about his ankle swelling. He is currently on Lasix but expresses reluctance to take it due to the need for frequent urination every 20 to 30 minutes. He also mentions that if he delays urination, he struggles to reach the bathroom in time.    He has been experiencing back pain, which he suspects may be due to lifting his wife. The pain has slightly improved but was severe a few weeks ago. He describes the pain as similar to an episode he had 40 years ago, which required hospitalization and was scheduled for surgery, although the procedure was ultimately not performed. He also reports leg weakness during ambulation, necessitating frequent rest periods. However, he notes a slight improvement in this symptom and has been increasing his walking distance.    He has a callus on the plantar aspect of his foot, which occasionally causes discomfort. His daughter has been applying a callus remover, which has provided some relief, but the area remains tender. He experiences a burning sensation and pain upon weight-bearing on the affected foot.    He was given a sample of pills for overactive bladder and a prescription, but he does not think it is very effective. His daughter wanted him to keep getting it filled, but he can not tell if it is helping much. He used to take them every morning.      Subjective      Review of  Systems:   Review of Systems    Past Medical History:   Past Medical History:   Diagnosis Date    Anxiety     Benign essential hypertension     Benign prostatic hyperplasia     WITH URINARY FREQUENCY    Benign prostatic hypertrophy     Coronary artery disease     Depressive disorder     DJD (degenerative joint disease)     INVOLVING MULTIPLE JOINTS    Dyslipidemia     In March of 2016:  Total cholesterol 137, triglycerides 180, HDL 38, LDL 63.    First degree atrioventricular block     Hyperglycemia     Injury from a fall in 2011     Mixed hyperlipidemia due to type 2 diabetes mellitus     Myasthenia gravis     Obesity     Obstructive sleep apnea with C-PAP use.     Osteoarthritis     Primary osteoarthritis involving multiple joints     Recurrent major depressive episodes, moderate     Type 2 diabetes mellitus     WITHOUT HYPERGLYCEMIA WITHOUT LONG TERM CURRENT USE OF INSULIN    Type 2 diabetes, onset 2001.  In 2016, A1c was 6.5.        Past Surgical History:   Past Surgical History:   Procedure Laterality Date    CYST REMOVAL  1976    BACK    HAND SURGERY      TONSILLECTOMY      TUMOR EXCISION      skin excision for tumor       Family History:   Family History   Problem Relation Age of Onset    Stroke Mother     Hypertension Mother     Stroke Father     Hypertension Father     Diabetes Brother     Hypertension Brother     Prostate cancer Brother     Hyperlipidemia Brother        Social History:   Social History     Socioeconomic History    Marital status:    Tobacco Use    Smoking status: Never     Passive exposure: Never    Smokeless tobacco: Never   Vaping Use    Vaping status: Never Used   Substance and Sexual Activity    Alcohol use: Not Currently    Drug use: Never    Sexual activity: Defer       Medications:     Current Outpatient Medications:     acetaminophen (TYLENOL) 325 MG tablet, Take 2 tablets by mouth As Needed for Mild Pain., Disp: , Rfl:     apixaban (ELIQUIS) 2.5 MG tablet tablet, Take 1  tablet by mouth 2 (Two) Times a Day., Disp: 60 tablet, Rfl: 11    ascorbic acid (VITAMIN C) 1000 MG tablet, Take 1 tablet by mouth As Needed., Disp: , Rfl:     atorvastatin (LIPITOR) 40 MG tablet, Take 1 tablet by mouth Daily., Disp: 90 tablet, Rfl: 3    cetirizine (ZyrTEC) 10 MG tablet, Take 1 tablet by mouth As Needed., Disp: , Rfl:     coenzyme Q10 100 MG capsule, Take 2 capsules by mouth Daily., Disp: , Rfl:     empagliflozin (JARDIANCE) 25 MG tablet tablet, Take 1 tablet by mouth Daily., Disp: , Rfl:     finasteride (PROSCAR) 5 MG tablet, Take 1 tablet by mouth once daily, Disp: 90 tablet, Rfl: 0    furosemide (LASIX) 20 MG tablet, Take 1 tablet by mouth once daily, Disp: 90 tablet, Rfl: 0    ipratropium (ATROVENT) 0.06 % nasal spray, Administer  into the nostril(s) as directed by provider., Disp: , Rfl:     Loratadine 10 MG capsule, Take 1 capsule by mouth Daily., Disp: , Rfl:     metFORMIN (GLUCOPHAGE) 1000 MG tablet, TAKE 1 TABLET BY MOUTH TWICE DAILY WITH MORNING MEAL AND WITH EVENING MEAL, Disp: 180 tablet, Rfl: 0    methylcellulose, Laxative, (CITRUCEL) 500 MG tablet tablet, Take 2 tablets by mouth As Needed., Disp: , Rfl:     metoprolol tartrate (LOPRESSOR) 25 MG tablet, Take 1/2 (one-half) tablet by mouth twice daily, Disp: 90 tablet, Rfl: 0    Multiple Vitamin (MULTI VITAMIN PO), Take  by mouth Daily., Disp: , Rfl:     nabumetone (RELAFEN) 500 MG tablet, Take 1 tablet by mouth 2 (Two) Times a Day. For low back pain, Disp: 30 tablet, Rfl: 0    omeprazole (priLOSEC) 20 MG capsule, Take 1 capsule by mouth Daily. 30 to 60 minutes prior to dinner, Disp: 90 capsule, Rfl: 3    oxybutynin XL (DITROPAN-XL) 5 MG 24 hr tablet, Take 1 tablet by mouth once daily, Disp: 30 tablet, Rfl: 0    potassium chloride (KLOR-CON M20) 20 MEQ CR tablet, Take 1 tablet by mouth once daily, Disp: 90 tablet, Rfl: 0    predniSONE (DELTASONE) 10 MG tablet, Take 1 tablet by mouth Daily., Disp: 90 tablet, Rfl: 3    pyridostigmine  "(MESTINON) 60 MG tablet, Take 1 tablet by mouth 3 (Three) Times a Day., Disp: 270 tablet, Rfl: 3    ranolazine (RANEXA) 500 MG 12 hr tablet, Take 1 tablet by mouth twice daily, Disp: 180 tablet, Rfl: 1    sucralfate (CARAFATE) 1 g tablet, TAKE 1 TABLET BY MOUTH TWICE DAILY WITH MEALS, Disp: 60 tablet, Rfl: 5    tamsulosin (FLOMAX) 0.4 MG capsule 24 hr capsule, Take 1 capsule by mouth once daily, Disp: 90 capsule, Rfl: 0    venlafaxine XR (EFFEXOR-XR) 150 MG 24 hr capsule, Take 1 capsule by mouth once daily, Disp: 90 capsule, Rfl: 1    Allergies:   Allergies   Allergen Reactions    Other Unknown - Low Severity     Horse Serum    Tetanus Immune Globulin Unknown - Low Severity       Objective     Physical Exam:  Vital Signs:   Vitals:    06/05/25 0849   BP: 110/76   Pulse: 78   SpO2: 93%   Weight: 116 kg (256 lb)   Height: 185.4 cm (73\")     Body mass index is 33.78 kg/m².     Physical Exam  Physical Exam  Extremities: Swelling noted in the ankle.  Musculoskeletal: Swelling noted in the foot, tenderness at the site of the corn/callus, and pain in the lower back.  Skin: Corn/callus present on the bottom of the foot.    Procedures    Results    Assessment / Plan      Assessment/Plan:   Diagnoses and all orders for this visit:    1. Chronic low back pain, unspecified back pain laterality, unspecified whether sciatica present (Primary)    2. Edema, unspecified type    3. Callus    4. Overactive bladder    Other orders  -     nabumetone (RELAFEN) 500 MG tablet; Take 1 tablet by mouth 2 (Two) Times a Day. For low back pain  Dispense: 30 tablet; Refill: 0       Assessment & Plan  1. Ankle swelling.  - Reports swelling in the ankle.  - Physical exam reveals noticeable swelling.  - Discussed the importance of taking Lasix (furosemide) to reduce swelling, especially on non-travel days.  - Advised to take Lasix once or twice a week, preferably in the morning.    2. Back pain.  - Complains of back pain, which worsened a couple of " weeks ago.  - Physical exam indicates localized back pain without radiation to the legs.  - Discussed the use of an anti-inflammatory medication to manage pain and inflammation.  - Prescribed an anti-inflammatory medication to be taken twice daily with food for 2 weeks.    3. Callus on the foot.  - Reports pain from a callus on the bottom of the foot.  - Physical exam confirms the presence of a callus.  - Advised to continue using the corn and callus remover daily for a couple of weeks.  - Daughter to assist with application due to difficulty reaching the area.    4. Overactive bladder.  - Reports minimal improvement with the current medication.  - Discussed increasing the dose by taking two 5 mg pills simultaneously for the next few days.  - If effective, a higher dose prescription will be considered.        Follow Up:   No follow-ups on file.    Mike Bhardwaj  [facility]     Patient or patient representative verbalized consent for the use of Ambient Listening during the visit with  Mike Bhardwaj MD for chart documentation. 6/5/2025  09:13 EDT

## 2025-06-09 RX ORDER — OXYBUTYNIN CHLORIDE 5 MG/1
5 TABLET, EXTENDED RELEASE ORAL DAILY
Qty: 30 TABLET | Refills: 0 | OUTPATIENT
Start: 2025-06-09

## 2025-06-17 RX ORDER — OXYBUTYNIN CHLORIDE 5 MG/1
5 TABLET, EXTENDED RELEASE ORAL DAILY
Qty: 30 TABLET | Refills: 0 | Status: SHIPPED | OUTPATIENT
Start: 2025-06-17

## 2025-06-30 RX ORDER — OXYBUTYNIN CHLORIDE 5 MG/1
5 TABLET, EXTENDED RELEASE ORAL DAILY
Qty: 30 TABLET | Refills: 0 | OUTPATIENT
Start: 2025-06-30

## 2025-06-30 RX ORDER — METOPROLOL TARTRATE 25 MG/1
12.5 TABLET, FILM COATED ORAL EVERY 12 HOURS SCHEDULED
Qty: 90 TABLET | Refills: 0 | Status: SHIPPED | OUTPATIENT
Start: 2025-06-30

## 2025-07-08 RX ORDER — OXYBUTYNIN CHLORIDE 5 MG/1
5 TABLET, EXTENDED RELEASE ORAL DAILY
Qty: 30 TABLET | Refills: 1 | Status: SHIPPED | OUTPATIENT
Start: 2025-07-08

## 2025-07-08 NOTE — TELEPHONE ENCOUNTER
Called patient to let him know about his refills and to re-establish care with another provider. He will come by on Thursday and schedule an appointment.

## 2025-07-23 ENCOUNTER — OFFICE VISIT (OUTPATIENT)
Dept: FAMILY MEDICINE CLINIC | Facility: CLINIC | Age: 89
End: 2025-07-23
Payer: MEDICARE

## 2025-07-23 VITALS
BODY MASS INDEX: 33.13 KG/M2 | HEART RATE: 86 BPM | OXYGEN SATURATION: 97 % | WEIGHT: 250 LBS | HEIGHT: 73 IN | SYSTOLIC BLOOD PRESSURE: 122 MMHG | DIASTOLIC BLOOD PRESSURE: 60 MMHG

## 2025-07-23 DIAGNOSIS — R30.0 DYSURIA: ICD-10-CM

## 2025-07-23 DIAGNOSIS — N32.81 OVERACTIVE BLADDER: Primary | ICD-10-CM

## 2025-07-23 DIAGNOSIS — E11.65 TYPE 2 DIABETES MELLITUS WITH HYPERGLYCEMIA, WITHOUT LONG-TERM CURRENT USE OF INSULIN: ICD-10-CM

## 2025-07-23 LAB
BILIRUB BLD-MCNC: NEGATIVE MG/DL
CLARITY, POC: CLEAR
COLOR UR: YELLOW
EXPIRATION DATE: ABNORMAL
EXPIRATION DATE: NORMAL
GLUCOSE UR STRIP-MCNC: ABNORMAL MG/DL
KETONES UR QL: NEGATIVE
LEUKOCYTE EST, POC: NEGATIVE
Lab: ABNORMAL
Lab: NORMAL
NITRITE UR-MCNC: NEGATIVE MG/ML
PH UR: 5.5 [PH] (ref 5–8)
POC ALBUMIN, URINE: 30 MG/L
POC CREATININE, URINE: 50 MG/DL
POC URINE ALB/CREA RATIO: NORMAL
PROT UR STRIP-MCNC: NEGATIVE MG/DL
RBC # UR STRIP: ABNORMAL /UL
SP GR UR: 1.01 (ref 1–1.03)
UROBILINOGEN UR QL: NORMAL

## 2025-07-23 PROCEDURE — 82570 ASSAY OF URINE CREATININE: CPT | Performed by: STUDENT IN AN ORGANIZED HEALTH CARE EDUCATION/TRAINING PROGRAM

## 2025-07-23 PROCEDURE — 99214 OFFICE O/P EST MOD 30 MIN: CPT | Performed by: STUDENT IN AN ORGANIZED HEALTH CARE EDUCATION/TRAINING PROGRAM

## 2025-07-23 PROCEDURE — 1160F RVW MEDS BY RX/DR IN RCRD: CPT | Performed by: STUDENT IN AN ORGANIZED HEALTH CARE EDUCATION/TRAINING PROGRAM

## 2025-07-23 PROCEDURE — 1126F AMNT PAIN NOTED NONE PRSNT: CPT | Performed by: STUDENT IN AN ORGANIZED HEALTH CARE EDUCATION/TRAINING PROGRAM

## 2025-07-23 PROCEDURE — 1159F MED LIST DOCD IN RCRD: CPT | Performed by: STUDENT IN AN ORGANIZED HEALTH CARE EDUCATION/TRAINING PROGRAM

## 2025-07-23 PROCEDURE — 82044 UR ALBUMIN SEMIQUANTITATIVE: CPT | Performed by: STUDENT IN AN ORGANIZED HEALTH CARE EDUCATION/TRAINING PROGRAM

## 2025-07-23 PROCEDURE — 81003 URINALYSIS AUTO W/O SCOPE: CPT | Performed by: STUDENT IN AN ORGANIZED HEALTH CARE EDUCATION/TRAINING PROGRAM

## 2025-07-23 RX ORDER — OXYBUTYNIN CHLORIDE 10 MG/1
10 TABLET, EXTENDED RELEASE ORAL DAILY
Qty: 30 TABLET | Refills: 1 | Status: SHIPPED | OUTPATIENT
Start: 2025-07-23

## 2025-07-23 RX ORDER — HYDROXYZINE HYDROCHLORIDE 10 MG/1
10 TABLET, FILM COATED ORAL
Qty: 30 TABLET | Refills: 1 | Status: SHIPPED | OUTPATIENT
Start: 2025-07-23

## 2025-07-23 NOTE — PROGRESS NOTES
"Chief Complaint  Follow-up    Subjective          Arie Fournier presents to [facility]  History of Present Illness    Patient presents the office today to establish care.  He has a past medical history significant for hypertension, hyperlipidemia, type 2 diabetes, A-fib and first-degree heart block, myasthenia gravis and overactive bladder with BPH.    Patient states that his last visit with his previous primary they were discussing adjusting the medication for overactive bladder due to continued symptoms, however this was not ever formally changed.  Patient would like to discuss this further at this time.  He states that he has issues with urinary urgency, and urge incontinence.         Objective   Vital Signs:   /60   Pulse 86   Ht 185.4 cm (73\")   Wt 113 kg (250 lb)   SpO2 97%   BMI 32.98 kg/m²     Body mass index is 32.98 kg/m².    Review of Systems    Past History:  Medical History: has a past medical history of Anxiety, Benign essential hypertension, Benign prostatic hyperplasia, Benign prostatic hypertrophy, Coronary artery disease, Depressive disorder, DJD (degenerative joint disease), Dyslipidemia, First degree atrioventricular block, Hyperglycemia, Injury from a fall in 2011, Mixed hyperlipidemia due to type 2 diabetes mellitus, Myasthenia gravis, Obesity, Obstructive sleep apnea with C-PAP use., Osteoarthritis, Primary osteoarthritis involving multiple joints, Recurrent major depressive episodes, moderate, Type 2 diabetes mellitus, and Type 2 diabetes, onset 2001.  In 2016, A1c was 6.5..   Surgical History: has a past surgical history that includes Tonsillectomy; Hand surgery; Tumor excision; and Cyst Removal (1976).   Family History: family history includes Diabetes in his brother; Hyperlipidemia in his brother; Hypertension in his brother, father, and mother; Prostate cancer in his brother; Stroke in his father and mother.   Social History: reports that " he has never smoked. He has never been exposed to tobacco smoke. He has never used smokeless tobacco. He reports that he does not currently use alcohol. He reports that he does not use drugs.      Current Outpatient Medications:     acetaminophen (TYLENOL) 325 MG tablet, Take 2 tablets by mouth As Needed for Mild Pain., Disp: , Rfl:     apixaban (ELIQUIS) 2.5 MG tablet tablet, Take 1 tablet by mouth 2 (Two) Times a Day., Disp: 60 tablet, Rfl: 11    ascorbic acid (VITAMIN C) 1000 MG tablet, Take 1 tablet by mouth As Needed., Disp: , Rfl:     atorvastatin (LIPITOR) 40 MG tablet, Take 1 tablet by mouth Daily., Disp: 90 tablet, Rfl: 3    cetirizine (ZyrTEC) 10 MG tablet, Take 1 tablet by mouth As Needed., Disp: , Rfl:     coenzyme Q10 100 MG capsule, Take 2 capsules by mouth Daily., Disp: , Rfl:     empagliflozin (JARDIANCE) 25 MG tablet tablet, Take 1 tablet by mouth Daily., Disp: , Rfl:     finasteride (PROSCAR) 5 MG tablet, Take 1 tablet by mouth once daily, Disp: 90 tablet, Rfl: 0    furosemide (LASIX) 20 MG tablet, Take 1 tablet by mouth once daily, Disp: 90 tablet, Rfl: 0    ipratropium (ATROVENT) 0.06 % nasal spray, Administer  into the nostril(s) as directed by provider., Disp: , Rfl:     Loratadine 10 MG capsule, Take 1 capsule by mouth Daily., Disp: , Rfl:     metFORMIN (GLUCOPHAGE) 1000 MG tablet, TAKE 1 TABLET BY MOUTH TWICE DAILY WITH MORNING MEAL AND WITH EVENING MEAL, Disp: 180 tablet, Rfl: 0    methylcellulose, Laxative, (CITRUCEL) 500 MG tablet tablet, Take 2 tablets by mouth As Needed., Disp: , Rfl:     metoprolol tartrate (LOPRESSOR) 25 MG tablet, Take 1/2 (one-half) tablet by mouth twice daily, Disp: 90 tablet, Rfl: 0    Multiple Vitamin (MULTI VITAMIN PO), Take  by mouth Daily., Disp: , Rfl:     nabumetone (RELAFEN) 500 MG tablet, Take 1 tablet by mouth 2 (Two) Times a Day. For low back pain, Disp: 30 tablet, Rfl: 0    omeprazole (priLOSEC) 20 MG capsule, Take 1 capsule by mouth Daily. 30 to 60  minutes prior to dinner, Disp: 90 capsule, Rfl: 3    oxybutynin XL (DITROPAN-XL) 10 MG 24 hr tablet, Take 1 tablet by mouth Daily., Disp: 30 tablet, Rfl: 1    potassium chloride (KLOR-CON M20) 20 MEQ CR tablet, Take 1 tablet by mouth once daily, Disp: 90 tablet, Rfl: 0    predniSONE (DELTASONE) 10 MG tablet, Take 1 tablet by mouth Daily., Disp: 90 tablet, Rfl: 3    pyridostigmine (MESTINON) 60 MG tablet, Take 1 tablet by mouth 3 (Three) Times a Day., Disp: 270 tablet, Rfl: 3    ranolazine (RANEXA) 500 MG 12 hr tablet, Take 1 tablet by mouth twice daily, Disp: 180 tablet, Rfl: 1    sucralfate (CARAFATE) 1 g tablet, TAKE 1 TABLET BY MOUTH TWICE DAILY WITH MEALS, Disp: 60 tablet, Rfl: 5    tamsulosin (FLOMAX) 0.4 MG capsule 24 hr capsule, Take 1 capsule by mouth once daily, Disp: 90 capsule, Rfl: 0    venlafaxine XR (EFFEXOR-XR) 150 MG 24 hr capsule, Take 1 capsule by mouth once daily, Disp: 90 capsule, Rfl: 1    hydrOXYzine (ATARAX) 10 MG tablet, Take 1 tablet by mouth every night at bedtime., Disp: 30 tablet, Rfl: 1    Allergies: Other and Tetanus immune globulin    Physical Exam  Constitutional:       General: He is not in acute distress.     Appearance: He is not ill-appearing or toxic-appearing.   HENT:      Head: Normocephalic and atraumatic.      Ears:      Comments: Hard of hearing  Cardiovascular:      Rate and Rhythm: Normal rate and regular rhythm.      Heart sounds: No murmur heard.  Pulmonary:      Effort: Pulmonary effort is normal. No respiratory distress.   Musculoskeletal:      Right lower leg: No edema.      Left lower leg: No edema.   Neurological:      General: No focal deficit present.      Mental Status: He is alert and oriented to person, place, and time.   Psychiatric:         Mood and Affect: Mood normal.         Thought Content: Thought content normal.          Result Review :                   Assessment and Plan    Diagnoses and all orders for this visit:    1. Overactive bladder  (Primary)  -     Urine Culture - Urine, Urine, Clean Catch  -     POC Urinalysis Dipstick, Automated    2. Type 2 diabetes mellitus with hyperglycemia, without long-term current use of insulin  -     POC Albumin/Creatinine Ratio Urine    3. Dysuria  -     Urine Culture - Urine, Urine, Clean Catch  -     POC Urinalysis Dipstick, Automated    Other orders  -     oxybutynin XL (DITROPAN-XL) 10 MG 24 hr tablet; Take 1 tablet by mouth Daily.  Dispense: 30 tablet; Refill: 1  -     hydrOXYzine (ATARAX) 10 MG tablet; Take 1 tablet by mouth every night at bedtime.  Dispense: 30 tablet; Refill: 1    UA and culture performed as well as urine micro as patient is due for this.    Advised that we will treat if UA or culture come back positive for infection.  Patient is agreeable to this.    In the meantime we will go ahead and increase oxybutynin as this was discussed with his previous primary, and he did notice improvement when he was taking 2 of the oxybutynin 5 mg daily.    Hydroxyzine to help with sleep as he states that he has had significant difficulty with sleep for some time, acutely worsening when his wife had to go to rehab after having multiple hip surgeries and falls.    Follow-up in 2 to 3 months or sooner with any new or worsening symptoms for annual wellness visit.    Follow Up   Return for Medicare Wellness .  Patient was given instructions and counseling regarding his condition or for health maintenance advice. Please see specific information pulled into the AVS if appropriate.     Ivana Pope,

## 2025-07-25 LAB
BACTERIA UR CULT: NORMAL
BACTERIA UR CULT: NORMAL

## 2025-08-02 DIAGNOSIS — N40.0 BENIGN PROSTATIC HYPERPLASIA, UNSPECIFIED WHETHER LOWER URINARY TRACT SYMPTOMS PRESENT: ICD-10-CM

## 2025-08-04 RX ORDER — TAMSULOSIN HYDROCHLORIDE 0.4 MG/1
1 CAPSULE ORAL DAILY
Qty: 90 CAPSULE | Refills: 0 | Status: SHIPPED | OUTPATIENT
Start: 2025-08-04

## 2025-08-04 RX ORDER — FINASTERIDE 5 MG/1
5 TABLET, FILM COATED ORAL DAILY
Qty: 90 TABLET | Refills: 0 | Status: SHIPPED | OUTPATIENT
Start: 2025-08-04

## 2025-08-21 ENCOUNTER — TELEPHONE (OUTPATIENT)
Dept: FAMILY MEDICINE CLINIC | Facility: CLINIC | Age: 89
End: 2025-08-21
Payer: MEDICARE

## 2025-08-29 DIAGNOSIS — F32.A DEPRESSION, UNSPECIFIED DEPRESSION TYPE: ICD-10-CM

## 2025-08-29 RX ORDER — VENLAFAXINE HYDROCHLORIDE 150 MG/1
150 CAPSULE, EXTENDED RELEASE ORAL DAILY
Qty: 90 CAPSULE | Refills: 0 | Status: SHIPPED | OUTPATIENT
Start: 2025-08-29